# Patient Record
Sex: FEMALE | Race: WHITE | Employment: UNEMPLOYED | URBAN - METROPOLITAN AREA
[De-identification: names, ages, dates, MRNs, and addresses within clinical notes are randomized per-mention and may not be internally consistent; named-entity substitution may affect disease eponyms.]

---

## 2020-01-27 ENCOUNTER — APPOINTMENT (OUTPATIENT)
Dept: CT IMAGING | Age: 80
DRG: 392 | End: 2020-01-27
Payer: MEDICARE

## 2020-01-27 ENCOUNTER — HOSPITAL ENCOUNTER (INPATIENT)
Age: 80
LOS: 3 days | Discharge: NURSING, REHAB OR LONG-TERM CARE | DRG: 392 | End: 2020-01-31
Attending: EMERGENCY MEDICINE | Admitting: INTERNAL MEDICINE
Payer: MEDICARE

## 2020-01-27 PROBLEM — K52.9 ACUTE COLITIS: Status: ACTIVE | Noted: 2020-01-27

## 2020-01-27 PROBLEM — I10 ESSENTIAL HYPERTENSION: Status: ACTIVE | Noted: 2020-01-27

## 2020-01-27 LAB
ALBUMIN SERPL-MCNC: 4.2 G/DL (ref 3.5–5.2)
ALP BLD-CCNC: 89 U/L (ref 35–104)
ALT SERPL-CCNC: 8 U/L (ref 0–32)
ANION GAP SERPL CALCULATED.3IONS-SCNC: 12 MMOL/L (ref 7–16)
AST SERPL-CCNC: 19 U/L (ref 0–31)
BACTERIA: ABNORMAL /HPF
BILIRUB SERPL-MCNC: <0.2 MG/DL (ref 0–1.2)
BILIRUBIN URINE: NEGATIVE
BLOOD, URINE: ABNORMAL
BUN BLDV-MCNC: 16 MG/DL (ref 8–23)
CALCIUM SERPL-MCNC: 9.2 MG/DL (ref 8.6–10.2)
CHLORIDE BLD-SCNC: 105 MMOL/L (ref 98–107)
CLARITY: CLEAR
CO2: 21 MMOL/L (ref 22–29)
COLOR: YELLOW
CREAT SERPL-MCNC: 0.9 MG/DL (ref 0.5–1)
EKG ATRIAL RATE: 66 BPM
EKG P AXIS: 49 DEGREES
EKG P-R INTERVAL: 186 MS
EKG Q-T INTERVAL: 464 MS
EKG QRS DURATION: 104 MS
EKG QTC CALCULATION (BAZETT): 486 MS
EKG R AXIS: -23 DEGREES
EKG T AXIS: 5 DEGREES
EKG VENTRICULAR RATE: 66 BPM
EPITHELIAL CELLS, UA: ABNORMAL /HPF
GFR AFRICAN AMERICAN: >60
GFR NON-AFRICAN AMERICAN: >60 ML/MIN/1.73
GLUCOSE BLD-MCNC: 123 MG/DL (ref 74–99)
GLUCOSE URINE: NEGATIVE MG/DL
HCT VFR BLD CALC: 43.4 % (ref 34–48)
HEMOGLOBIN: 13.8 G/DL (ref 11.5–15.5)
INR BLD: 1
KETONES, URINE: NEGATIVE MG/DL
LACTIC ACID: 3 MMOL/L (ref 0.5–2.2)
LACTIC ACID: 4 MMOL/L (ref 0.5–2.2)
LEUKOCYTE ESTERASE, URINE: ABNORMAL
LIPASE: 71 U/L (ref 13–60)
MCH RBC QN AUTO: 30.5 PG (ref 26–35)
MCHC RBC AUTO-ENTMCNC: 31.8 % (ref 32–34.5)
MCV RBC AUTO: 96 FL (ref 80–99.9)
NITRITE, URINE: NEGATIVE
PDW BLD-RTO: 13.5 FL (ref 11.5–15)
PH UA: 7 (ref 5–9)
PLATELET # BLD: 332 E9/L (ref 130–450)
PMV BLD AUTO: 11.4 FL (ref 7–12)
POTASSIUM SERPL-SCNC: 4.2 MMOL/L (ref 3.5–5)
PROTEIN UA: NEGATIVE MG/DL
PROTHROMBIN TIME: 11.2 SEC (ref 9.3–12.4)
RBC # BLD: 4.52 E12/L (ref 3.5–5.5)
RBC UA: ABNORMAL /HPF (ref 0–2)
REASON FOR REJECTION: NORMAL
REASON FOR REJECTION: NORMAL
REJECTED TEST: NORMAL
REJECTED TEST: NORMAL
SODIUM BLD-SCNC: 138 MMOL/L (ref 132–146)
SPECIFIC GRAVITY UA: 1.01 (ref 1–1.03)
TOTAL PROTEIN: 6.8 G/DL (ref 6.4–8.3)
TROPONIN: <0.01 NG/ML (ref 0–0.03)
UROBILINOGEN, URINE: 0.2 E.U./DL
WBC # BLD: 13.6 E9/L (ref 4.5–11.5)
WBC UA: ABNORMAL /HPF (ref 0–5)

## 2020-01-27 PROCEDURE — G0378 HOSPITAL OBSERVATION PER HR: HCPCS

## 2020-01-27 PROCEDURE — 93005 ELECTROCARDIOGRAM TRACING: CPT | Performed by: EMERGENCY MEDICINE

## 2020-01-27 PROCEDURE — 96375 TX/PRO/DX INJ NEW DRUG ADDON: CPT

## 2020-01-27 PROCEDURE — 2580000003 HC RX 258: Performed by: NURSE PRACTITIONER

## 2020-01-27 PROCEDURE — 96366 THER/PROPH/DIAG IV INF ADDON: CPT

## 2020-01-27 PROCEDURE — 96365 THER/PROPH/DIAG IV INF INIT: CPT

## 2020-01-27 PROCEDURE — 6370000000 HC RX 637 (ALT 250 FOR IP): Performed by: NURSE PRACTITIONER

## 2020-01-27 PROCEDURE — 83605 ASSAY OF LACTIC ACID: CPT

## 2020-01-27 PROCEDURE — 87088 URINE BACTERIA CULTURE: CPT

## 2020-01-27 PROCEDURE — 36415 COLL VENOUS BLD VENIPUNCTURE: CPT

## 2020-01-27 PROCEDURE — 81001 URINALYSIS AUTO W/SCOPE: CPT

## 2020-01-27 PROCEDURE — 74177 CT ABD & PELVIS W/CONTRAST: CPT

## 2020-01-27 PROCEDURE — 2500000003 HC RX 250 WO HCPCS: Performed by: EMERGENCY MEDICINE

## 2020-01-27 PROCEDURE — 99285 EMERGENCY DEPT VISIT HI MDM: CPT

## 2020-01-27 PROCEDURE — 2580000003 HC RX 258: Performed by: EMERGENCY MEDICINE

## 2020-01-27 PROCEDURE — 6360000002 HC RX W HCPCS: Performed by: EMERGENCY MEDICINE

## 2020-01-27 PROCEDURE — 96361 HYDRATE IV INFUSION ADD-ON: CPT

## 2020-01-27 PROCEDURE — 93010 ELECTROCARDIOGRAM REPORT: CPT | Performed by: INTERNAL MEDICINE

## 2020-01-27 PROCEDURE — 2580000003 HC RX 258: Performed by: RADIOLOGY

## 2020-01-27 PROCEDURE — 6360000002 HC RX W HCPCS: Performed by: NURSE PRACTITIONER

## 2020-01-27 PROCEDURE — 2500000003 HC RX 250 WO HCPCS: Performed by: NURSE PRACTITIONER

## 2020-01-27 PROCEDURE — 84484 ASSAY OF TROPONIN QUANT: CPT

## 2020-01-27 PROCEDURE — 85610 PROTHROMBIN TIME: CPT

## 2020-01-27 PROCEDURE — 87186 SC STD MICRODIL/AGAR DIL: CPT

## 2020-01-27 PROCEDURE — 83690 ASSAY OF LIPASE: CPT

## 2020-01-27 PROCEDURE — 96374 THER/PROPH/DIAG INJ IV PUSH: CPT

## 2020-01-27 PROCEDURE — 6360000004 HC RX CONTRAST MEDICATION: Performed by: RADIOLOGY

## 2020-01-27 PROCEDURE — 80053 COMPREHEN METABOLIC PANEL: CPT

## 2020-01-27 PROCEDURE — 85027 COMPLETE CBC AUTOMATED: CPT

## 2020-01-27 PROCEDURE — 96367 TX/PROPH/DG ADDL SEQ IV INF: CPT

## 2020-01-27 PROCEDURE — 96376 TX/PRO/DX INJ SAME DRUG ADON: CPT

## 2020-01-27 RX ORDER — SODIUM CHLORIDE 0.9 % (FLUSH) 0.9 %
10 SYRINGE (ML) INJECTION EVERY 12 HOURS SCHEDULED
Status: DISCONTINUED | OUTPATIENT
Start: 2020-01-27 | End: 2020-01-31 | Stop reason: HOSPADM

## 2020-01-27 RX ORDER — ACETAMINOPHEN 325 MG/1
650 TABLET ORAL EVERY 6 HOURS PRN
COMMUNITY

## 2020-01-27 RX ORDER — ACETAMINOPHEN 325 MG/1
650 TABLET ORAL EVERY 4 HOURS PRN
Status: DISCONTINUED | OUTPATIENT
Start: 2020-01-27 | End: 2020-01-31 | Stop reason: HOSPADM

## 2020-01-27 RX ORDER — DONEPEZIL HYDROCHLORIDE 10 MG/1
10 TABLET, FILM COATED ORAL NIGHTLY
COMMUNITY

## 2020-01-27 RX ORDER — CITALOPRAM 20 MG/1
20 TABLET ORAL DAILY
Status: ON HOLD | COMMUNITY
End: 2021-09-06

## 2020-01-27 RX ORDER — HYDRALAZINE HYDROCHLORIDE 20 MG/ML
5 INJECTION INTRAMUSCULAR; INTRAVENOUS EVERY 6 HOURS PRN
Status: DISCONTINUED | OUTPATIENT
Start: 2020-01-27 | End: 2020-01-28

## 2020-01-27 RX ORDER — DIPHENHYDRAMINE HCL 25 MG
25 CAPSULE ORAL NIGHTLY PRN
Status: ON HOLD | COMMUNITY
End: 2021-09-13 | Stop reason: HOSPADM

## 2020-01-27 RX ORDER — ONDANSETRON 4 MG/1
4 TABLET, ORALLY DISINTEGRATING ORAL EVERY 8 HOURS PRN
Qty: 9 TABLET | Refills: 0 | Status: SHIPPED | OUTPATIENT
Start: 2020-01-27 | End: 2020-01-31 | Stop reason: HOSPADM

## 2020-01-27 RX ORDER — SODIUM CHLORIDE 0.9 % (FLUSH) 0.9 %
10 SYRINGE (ML) INJECTION
Status: COMPLETED | OUTPATIENT
Start: 2020-01-27 | End: 2020-01-27

## 2020-01-27 RX ORDER — ONDANSETRON 2 MG/ML
4 INJECTION INTRAMUSCULAR; INTRAVENOUS EVERY 6 HOURS PRN
Status: DISCONTINUED | OUTPATIENT
Start: 2020-01-27 | End: 2020-01-31 | Stop reason: HOSPADM

## 2020-01-27 RX ORDER — 0.9 % SODIUM CHLORIDE 0.9 %
1000 INTRAVENOUS SOLUTION INTRAVENOUS ONCE
Status: COMPLETED | OUTPATIENT
Start: 2020-01-27 | End: 2020-01-27

## 2020-01-27 RX ORDER — HYDROCODONE BITARTRATE AND ACETAMINOPHEN 5; 325 MG/1; MG/1
1 TABLET ORAL ONCE
Status: COMPLETED | OUTPATIENT
Start: 2020-01-27 | End: 2020-01-27

## 2020-01-27 RX ORDER — SODIUM CHLORIDE 0.9 % (FLUSH) 0.9 %
10 SYRINGE (ML) INJECTION PRN
Status: DISCONTINUED | OUTPATIENT
Start: 2020-01-27 | End: 2020-01-31 | Stop reason: HOSPADM

## 2020-01-27 RX ADMIN — Medication 10 ML: at 07:10

## 2020-01-27 RX ADMIN — SODIUM CHLORIDE, PRESERVATIVE FREE 10 ML: 5 INJECTION INTRAVENOUS at 20:02

## 2020-01-27 RX ADMIN — ONDANSETRON HYDROCHLORIDE 4 MG: 2 SOLUTION INTRAMUSCULAR; INTRAVENOUS at 04:16

## 2020-01-27 RX ADMIN — HYDROCODONE BITARTRATE AND ACETAMINOPHEN 1 TABLET: 5; 325 TABLET ORAL at 16:27

## 2020-01-27 RX ADMIN — HYDRALAZINE HYDROCHLORIDE 5 MG: 20 INJECTION INTRAMUSCULAR; INTRAVENOUS at 14:34

## 2020-01-27 RX ADMIN — ENOXAPARIN SODIUM 40 MG: 40 INJECTION SUBCUTANEOUS at 14:04

## 2020-01-27 RX ADMIN — SODIUM CHLORIDE 1000 ML: 9 INJECTION, SOLUTION INTRAVENOUS at 07:39

## 2020-01-27 RX ADMIN — CEFTRIAXONE SODIUM 1 G: 1 INJECTION, POWDER, FOR SOLUTION INTRAMUSCULAR; INTRAVENOUS at 09:34

## 2020-01-27 RX ADMIN — SODIUM CHLORIDE 1000 ML: 9 INJECTION, SOLUTION INTRAVENOUS at 04:15

## 2020-01-27 RX ADMIN — ACETAMINOPHEN 650 MG: 325 TABLET, FILM COATED ORAL at 19:54

## 2020-01-27 RX ADMIN — METRONIDAZOLE 500 MG: 500 INJECTION, SOLUTION INTRAVENOUS at 20:02

## 2020-01-27 RX ADMIN — ACETAMINOPHEN 650 MG: 325 TABLET, FILM COATED ORAL at 14:05

## 2020-01-27 RX ADMIN — IOPAMIDOL 110 ML: 755 INJECTION, SOLUTION INTRAVENOUS at 07:10

## 2020-01-27 RX ADMIN — METRONIDAZOLE 500 MG: 500 INJECTION, SOLUTION INTRAVENOUS at 12:15

## 2020-01-27 SDOH — HEALTH STABILITY: MENTAL HEALTH: HOW OFTEN DO YOU HAVE A DRINK CONTAINING ALCOHOL?: NEVER

## 2020-01-27 ASSESSMENT — PAIN SCALES - GENERAL
PAINLEVEL_OUTOF10: 10
PAINLEVEL_OUTOF10: 0
PAINLEVEL_OUTOF10: 4
PAINLEVEL_OUTOF10: 4

## 2020-01-27 NOTE — ED PROVIDER NOTES
HPI:  1/27/20, Time: 4:01 AM         Kya Mccoy is a [de-identified] y.o. female presenting to the ED for nausea vomiting diarrhea, beginning hours ago. The complaint has been persistent, moderate in severity, and worsened by nothing. Patient reporting nausea vomiting and diarrhea and reporting symptoms started hours ago. Patient reporting no chest pain or difficulty breathing. Patient is visiting from [place] patient reports she was at a party today. She reports nobody else being ill. Patient reporting no neck or back pain. ROS:   Pertinent positives and negatives are stated within HPI, all other systems reviewed and are negative.  --------------------------------------------- PAST HISTORY ---------------------------------------------  Past Medical History:  has no past medical history on file. Past Surgical History:  has no past surgical history on file. Social History:  reports that she has never smoked. She has never used smokeless tobacco. She reports that she does not drink alcohol or use drugs. Family History: family history is not on file. The patients home medications have been reviewed. Allergies: Patient has no known allergies. ---------------------------------------------------PHYSICAL EXAM--------------------------------------    Constitutional/General: Alert and oriented to person and place but not time mild distress  Head: Normocephalic and atraumatic  Eyes: PERRL, EOMI  Mouth: Oropharynx clear, handling secretions, no trismus  Neck: Supple, full ROM, non tender to palpation in the midline, no stridor, no crepitus, no meningeal signs  Pulmonary: Lungs clear to auscultation bilaterally, no wheezes, rales, or rhonchi. Not in respiratory distress  Cardiovascular:  Regular rate. Regular rhythm. No murmurs, gallops, or rubs. 2+ distal pulses  Chest: no chest wall tenderness  Abdomen: Soft. Non tender. Non distended. +BS. No rebound, guarding, or rigidity.  No pulsatile masses appreciated. Musculoskeletal: Moves all extremities x 4. Warm and well perfused, no clubbing, cyanosis, or edema. Capillary refill <3 seconds  Skin: warm and dry. No rashes. Neurologic: GCS 15, CN 2-12 grossly intact, no focal deficits, symmetric strength 5/5 in the upper and lower extremities bilaterally  Psych: Normal Affect    -------------------------------------------------- RESULTS -------------------------------------------------  I have personally reviewed all laboratory and imaging results for this patient. Results are listed below.      LABS:  Results for orders placed or performed during the hospital encounter of 01/27/20   CBC   Result Value Ref Range    WBC 13.6 (H) 4.5 - 11.5 E9/L    RBC 4.52 3.50 - 5.50 E12/L    Hemoglobin 13.8 11.5 - 15.5 g/dL    Hematocrit 43.4 34.0 - 48.0 %    MCV 96.0 80.0 - 99.9 fL    MCH 30.5 26.0 - 35.0 pg    MCHC 31.8 (L) 32.0 - 34.5 %    RDW 13.5 11.5 - 15.0 fL    Platelets 067 221 - 850 E9/L    MPV 11.4 7.0 - 12.0 fL   Lactic Acid, Plasma   Result Value Ref Range    Lactic Acid 4.0 (H) 0.5 - 2.2 mmol/L   Urinalysis   Result Value Ref Range    Color, UA Yellow Straw/Yellow    Clarity, UA Clear Clear    Glucose, Ur Negative Negative mg/dL    Bilirubin Urine Negative Negative    Ketones, Urine Negative Negative mg/dL    Specific Gravity, UA 1.010 1.005 - 1.030    Blood, Urine TRACE-INTACT Negative    pH, UA 7.0 5.0 - 9.0    Protein, UA Negative Negative mg/dL    Urobilinogen, Urine 0.2 <2.0 E.U./dL    Nitrite, Urine Negative Negative    Leukocyte Esterase, Urine TRACE (A) Negative   Protime-INR   Result Value Ref Range    Protime 11.2 9.3 - 12.4 sec    INR 1.0    SPECIMEN REJECTION   Result Value Ref Range    Rejected Test cmp trop lipas     Reason for Rejection see below    Troponin   Result Value Ref Range    Troponin <0.01 0.00 - 0.03 ng/mL   SPECIMEN REJECTION   Result Value Ref Range    Rejected Test CMP LIPASE     Reason for Rejection see below    Comprehensive ---------------------------------    IMPRESSION  1. Nausea vomiting and diarrhea    2. Lactic acidosis    3. Acute colitis        DISPOSITION  Disposition: To be discharged if CT within normal limits  Patient condition is stable        NOTE: This report was transcribed using voice recognition software. Every effort was made to ensure accuracy; however, inadvertent computerized transcription errors may be present          Maria Del Carmen Murphy MD  01/27/20 0430       Maria Del Carmen Murphy MD  01/27/20 0708    9:15 AM    I received this patient at sign out from Dr. Genella Pallas   I have discussed the patient's initial exam, treatment and plan of care with the out going physician. I have introduced my self to the patient / family and have answered their questions to this point. I have examined the patient myself and reviewed ordered tests / medications and reviewed any available results to this point. If a resident is involved in the Emergency Department care, I have discussed my findings and plan with them as well.     Nausea not feeling well  CT with acute colitis  Elevated wbc and elevated lactic  Abdomen soft, no pain now and no pain out of proportion  Medicine consulted for admission        Julian Fu MD  01/27/20 7492

## 2020-01-27 NOTE — ED NOTES
When asked if zofran helped, pt states not at all. Pt denies nausea and has no emesis while in ED.       Terri De Los Santos RN  01/27/20 6502

## 2020-01-27 NOTE — ED NOTES
Green top drawn via right brachial artery and sent for the third time.       Rhea Cobb RN  01/27/20 9794

## 2020-01-27 NOTE — ED NOTES
Pt asked for bedpan again to provide urine spec. Pt had another small bowel movement.  Pt awaiting transport to [address] [place] [address],[address], RN  01/27/20 2605

## 2020-01-27 NOTE — ED NOTES
Bed: 09  Expected date:   Expected time:   Means of arrival:   Comments:  triage     Bhupinder Ruiz, RN  01/27/20 8872

## 2020-01-28 PROBLEM — R11.2 NAUSEA AND VOMITING: Status: ACTIVE | Noted: 2020-01-28

## 2020-01-28 PROBLEM — N39.0 URINARY TRACT INFECTION: Status: ACTIVE | Noted: 2020-01-28

## 2020-01-28 LAB
ANION GAP SERPL CALCULATED.3IONS-SCNC: 16 MMOL/L (ref 7–16)
BUN BLDV-MCNC: 10 MG/DL (ref 8–23)
CALCIUM SERPL-MCNC: 9.3 MG/DL (ref 8.6–10.2)
CHLORIDE BLD-SCNC: 105 MMOL/L (ref 98–107)
CO2: 20 MMOL/L (ref 22–29)
CREAT SERPL-MCNC: 1 MG/DL (ref 0.5–1)
GFR AFRICAN AMERICAN: >60
GFR NON-AFRICAN AMERICAN: 53 ML/MIN/1.73
GLUCOSE BLD-MCNC: 103 MG/DL (ref 74–99)
HCT VFR BLD CALC: 38.2 % (ref 34–48)
HEMOGLOBIN: 12.1 G/DL (ref 11.5–15.5)
MAGNESIUM: 2.1 MG/DL (ref 1.6–2.6)
MCH RBC QN AUTO: 30.1 PG (ref 26–35)
MCHC RBC AUTO-ENTMCNC: 31.7 % (ref 32–34.5)
MCV RBC AUTO: 95 FL (ref 80–99.9)
PDW BLD-RTO: 13.7 FL (ref 11.5–15)
PLATELET # BLD: 276 E9/L (ref 130–450)
PMV BLD AUTO: 9.8 FL (ref 7–12)
POTASSIUM REFLEX MAGNESIUM: 3.6 MMOL/L (ref 3.5–5)
RBC # BLD: 4.02 E12/L (ref 3.5–5.5)
SODIUM BLD-SCNC: 141 MMOL/L (ref 132–146)
WBC # BLD: 8.7 E9/L (ref 4.5–11.5)

## 2020-01-28 PROCEDURE — 97162 PT EVAL MOD COMPLEX 30 MIN: CPT

## 2020-01-28 PROCEDURE — 2500000003 HC RX 250 WO HCPCS: Performed by: NURSE PRACTITIONER

## 2020-01-28 PROCEDURE — 2580000003 HC RX 258: Performed by: INTERNAL MEDICINE

## 2020-01-28 PROCEDURE — 1200000000 HC SEMI PRIVATE

## 2020-01-28 PROCEDURE — 6370000000 HC RX 637 (ALT 250 FOR IP): Performed by: NURSE PRACTITIONER

## 2020-01-28 PROCEDURE — 97530 THERAPEUTIC ACTIVITIES: CPT

## 2020-01-28 PROCEDURE — 96366 THER/PROPH/DIAG IV INF ADDON: CPT

## 2020-01-28 PROCEDURE — 2580000003 HC RX 258: Performed by: NURSE PRACTITIONER

## 2020-01-28 PROCEDURE — 96376 TX/PRO/DX INJ SAME DRUG ADON: CPT

## 2020-01-28 PROCEDURE — 97165 OT EVAL LOW COMPLEX 30 MIN: CPT

## 2020-01-28 PROCEDURE — 36415 COLL VENOUS BLD VENIPUNCTURE: CPT

## 2020-01-28 PROCEDURE — 6360000002 HC RX W HCPCS: Performed by: NURSE PRACTITIONER

## 2020-01-28 PROCEDURE — 97535 SELF CARE MNGMENT TRAINING: CPT

## 2020-01-28 PROCEDURE — 96372 THER/PROPH/DIAG INJ SC/IM: CPT

## 2020-01-28 PROCEDURE — 83735 ASSAY OF MAGNESIUM: CPT

## 2020-01-28 PROCEDURE — 85027 COMPLETE CBC AUTOMATED: CPT

## 2020-01-28 PROCEDURE — 80048 BASIC METABOLIC PNL TOTAL CA: CPT

## 2020-01-28 RX ORDER — SODIUM CHLORIDE, SODIUM LACTATE, POTASSIUM CHLORIDE, CALCIUM CHLORIDE 600; 310; 30; 20 MG/100ML; MG/100ML; MG/100ML; MG/100ML
INJECTION, SOLUTION INTRAVENOUS CONTINUOUS
Status: DISCONTINUED | OUTPATIENT
Start: 2020-01-28 | End: 2020-01-30

## 2020-01-28 RX ADMIN — ENOXAPARIN SODIUM 40 MG: 40 INJECTION SUBCUTANEOUS at 08:07

## 2020-01-28 RX ADMIN — SODIUM CHLORIDE, POTASSIUM CHLORIDE, SODIUM LACTATE AND CALCIUM CHLORIDE: 600; 310; 30; 20 INJECTION, SOLUTION INTRAVENOUS at 17:39

## 2020-01-28 RX ADMIN — CEFTRIAXONE SODIUM 1 G: 1 INJECTION, POWDER, FOR SOLUTION INTRAMUSCULAR; INTRAVENOUS at 08:07

## 2020-01-28 RX ADMIN — SODIUM CHLORIDE, PRESERVATIVE FREE 10 ML: 5 INJECTION INTRAVENOUS at 08:09

## 2020-01-28 RX ADMIN — ACETAMINOPHEN 650 MG: 325 TABLET, FILM COATED ORAL at 08:08

## 2020-01-28 RX ADMIN — SODIUM CHLORIDE, PRESERVATIVE FREE 10 ML: 5 INJECTION INTRAVENOUS at 17:37

## 2020-01-28 RX ADMIN — ACETAMINOPHEN 650 MG: 325 TABLET, FILM COATED ORAL at 03:08

## 2020-01-28 RX ADMIN — ONDANSETRON HYDROCHLORIDE 4 MG: 2 SOLUTION INTRAMUSCULAR; INTRAVENOUS at 08:25

## 2020-01-28 RX ADMIN — ACETAMINOPHEN 650 MG: 325 TABLET, FILM COATED ORAL at 21:20

## 2020-01-28 RX ADMIN — METRONIDAZOLE 500 MG: 500 INJECTION, SOLUTION INTRAVENOUS at 04:05

## 2020-01-28 ASSESSMENT — PAIN SCALES - GENERAL
PAINLEVEL_OUTOF10: 4
PAINLEVEL_OUTOF10: 3
PAINLEVEL_OUTOF10: 2
PAINLEVEL_OUTOF10: 0
PAINLEVEL_OUTOF10: 0

## 2020-01-28 ASSESSMENT — PAIN DESCRIPTION - DESCRIPTORS: DESCRIPTORS: HEADACHE

## 2020-01-28 NOTE — PROGRESS NOTES
OCCUPATIONAL THERAPY INITIAL EVALUATION      Date:2020  Patient Name: Inés Dumont  MRN: [id number]  : 1940  Room: [address] [place] [address]-A      [address], OTR/L [address][id number]    AM-PAC Daily Activity Raw Score:   Recommended Adaptive Equipment: ww, TBD for additional AE    Diagnosis: Acute colitis [K52.9]  Acute colitis [K52.9]  Urinary tract infection [N39.0]       Pertinent Medical History: HTN     Precautions:  Falls, bed alarm     Home Living: Pt lives with sister in 2 floor home (+basement). 1 step down to first floor, 0 handrails   Bath/bed on 2nd floor - flight of stairs + landing + 3 stairs, 0 handrail  Bathroom setup: tub/shower w/ bars, standard commode  Equipment owned: n/a    Prior Level of Function: independent with ADLs , shares IADLs; ambulated independently w/o AD  Driving: no - friend assists  Pt is a questionable historian - provided conflicting PLOF. Pain Level: Pt c/o headache this session    Cognition: A&O: 3/4 (Pt required min cues to recall month); Follows 1 step directions   Memory:  fair (3/3 immediate recall. After ~5 minutes, pt required cues to recall 3/3 words).     Sequencing:  fair    Problem solving:  fair    Judgement/safety:  fair -     Functional Assessment:   Initial Eval Status  Date: 20 Treatment Status  Date: Short Term Goals  Treatment frequency: PRN    Feeding Supervision   Independent   Grooming Stand by Assist   Standing at sink  Modified [place]    UB Dressing Stand by Assist   Modified [place]    LB Dressing Minimal Assist   Simulated pants    SBA for B socks  Supervision    Bathing Minimal Assist    simulated  Supervision    Toileting Minimal Assist   Supervision    Bed Mobility  Supine to sit: Stand by Assist   Sit to supine: Stand by Assist   Rolling: Independent   Supine to sit: Modified [place]   Sit to supine: Modified [place]    Functional Transfers SBA  Various surfaces  Supervision   Functional Mobility SBA w/ ww  Min A w/o AD  Supervision   Balance Sitting: SBA  Standing: SBA w/ ww  Min A w/o AD     Activity Tolerance Fair  Good   Visual/  Perceptual Glasses: yes, not present  Vision - WFL                Hand dominance: R   Strength ROM Additional Info:    RUE  4/5  WFL   good  and wfl FMC/dexterity noted during ADL tasks       LUE 4/5  WFL   good  and wfl FMC/dexterity noted during ADL tasks       Hearing: WFL  Sensation:  No c/o numbness or tingling   Tone: WFL   Edema: none noted                            Comments/Treatment: Upon arrival, patient lying in bed. Pt agreeable to OT session this date. Therapist facilitated bed mobility (cues for sequencing), functional transfers (various surfaces-EOB, chair, toilet w/ cues for safety/hand placement, attention and sequencing), standing tolerance tasks (addressing posture, balance and activity tolerance) and functional ambulation task with w/w (in hallway and bathrm/room w/ education/cuing on posture, w/w management, sequencing and safety). Therapist facilitated self-care retraining: UB/LB self-care tasks (socks, simulated pants), toileting task (including hygiene) and standing grooming task (w/ increased cues for sequencing and attention) while educating pt on modified techniques, posture, safety and energy conservation techniques. Skilled monitoring of HR, O2 sats and pts response to treatment. At end of session, patient lying in bed (bed alarm on) with call light and phone within reach, all lines and tubes intact. Pt would benefit from continued skilled OT to increase functional independence and quality of life. Eval Complexity: Low    Evaluation time includes thorough review of current medical information, gathering information on past medical history/social history and prior level of function, completion of standardized testing/informal observation of tasks, assessment of data, and development of POC/Goals.       Assessment of current deficits  Functional mobility [x]  ADLs [x] Strength [x]  Cognition [x]  Functional transfers  [x] IADLs [] Safety Awareness [x]  Endurance [x]  Fine Motor Coordination [] Balance [x] Vision/perception [] Sensation []   Gross Motor Coordination [] ROM [] Delirium []                  Motor Control []    Plan of Care:   ADL retraining [x]   Equipment needs [x]   Neuromuscular re-education [x] Energy Conservation Techniques [x]  Functional Transfer training [x] Patient and/or Family Education [x]  Functional Mobility training [x]  Environmental Modifications [x]  Cognitive re-training [x]   Compensatory techniques for ADLs [x]  Splinting Needs []   Positioning to improve overall function [x]   Therapeutic Activity [x]  Therapeutic Exercise  [x]  Visual/Perceptual: []    Delirium prevention/treatment  []   Other:  []    Rehab Potential: Good for established goals    Patient / Family Goal: Not stated     Patient and/or family were instructed on diagnosis, prognosis/goals and plan of care. Demonstrated fair- understanding. [] Malnutrition indicators have been identified and nursing has been notified to ensure a dietitian consult is ordered.        Low Evaluation completed +  Timed Treatment: 14 minutes  Tx Time in: 14:43  TxTime out: 14:57           Willam Bourne, OTR/L #[id number]    Linnea Ortiz, OTR/L #[id number]

## 2020-01-28 NOTE — H&P
[address] [facility]  History and Physical      CHIEF COMPLAINT:  Abdominal pain, nausea, weakness, dysuria    History of Present Illness:   Patient presents with constellation of complaints. She has had nausea, vomiting, and abdominal pian for several days. No alleviating or exacerbating factors. The abdominal pain is generalized and nonradiating; emesis is nonbloody non-bilious. Associated with all of this, she has had dizziness when stands, but no syncope. Patient with complaint of dysuria. No flank pain. Has not received antibiotics for this as outpatient. Furthermore, her sense that she has severe generalized weakness. Has been going on for a longer period of time, at least months. He is home with her elderly sister who is in very poor health. Apparently the patient can barely climb the symptoms and on her hands and knees. The patient denies any focal weakness. No shortness of breath or chest pain. In the ED she had a CT scan of the abdomen which shows diffuse colitis. She denies fevers or chills. She has never had a colonoscopy. PMHx:  Depression    PSH:  None    Medications Prior to Admission:    Medications Prior to Admission: donepezil (ARICEPT) 5 MG tablet, Take 5 mg by mouth nightly  acetaminophen (TYLENOL) 325 MG tablet, Take 325 mg by mouth every 6 hours as needed for Pain  citalopram (CELEXA) 20 MG tablet, Take 20 mg by mouth daily  diphenhydrAMINE (BENADRYL) 25 MG capsule, Take 25 mg by mouth nightly as needed for Sleep    Note that the patient's home medications were reviewed and the above list is accurate to the best of my knowledge at the time of the exam.    Allergies:    Patient has no known allergies. Social History:    reports that she has never smoked. She has never used smokeless tobacco. She reports that she does not drink alcohol or use drugs.     Family History:   No family history of GI issues like colon cancer, IBD    REVIEW OF SYSTEMS:  As above in

## 2020-01-28 NOTE — PROGRESS NOTES
Physical Therapy  Initial Assessment     Name: Mireille Urbina  : 1940  MRN: [id number]    Date of Service: 2020    Evaluating PT: Aziza Mendoza, PT, DPT [id number]    Room #:  8231/0817-P    Diagnosis: acute collitis  Precautions: falls, confusion  PMHx: none on file    Pt lives with sister in a 2 story house with 0 stair(s) and 0 rail(s) to enter. Bed is on the second floor and bath is on the second floor. 15 steps with R ascending hand rail to second floor. Pt ambulated with no AD independently prior to admission. Pt has history of confusion so above information may have limited accuracy. HPI: Pt presented to the ED on 2020     Initial Evaluation  Date: 2020 Treatment Date: Short Term/ Long Term   Goals   AM-PAC 6 Clicks      Was pt agreeable to Eval/treatment? yes     Does pt have pain? No complaints at this time     Bed Mobility  Rolling: SBA  Supine to sit: SBA  Sit to supine: SBA  Scooting: SBA  Rolling: Independent  Supine to sit: Independent  Sit to supine: Independent  Scooting: Independent   Transfers Sit to stand: Ame  Stand to sit: Ame  Stand pivot: Ame no AD  Sit to stand: Independent  Stand to sit: Independent  Stand pivot: Independent AAD   Ambulation   50 feet with front WW with Ame for [employer] management and balance  50 feet with AAD with SBA   Stair negotiation: NT  4 step(s) with 1 rail(s) with Ame   ROM B UE: per OT note  B LE: WNL     Strength B UE: per OT note  B LE: NT     Balance Sitting EOB: SBA  Dynamic standing: Ame  Sitting EOB: Independent  Dynamic standing: Independent     Pt is A & O x: 3 not oriented to date  Sensation: WNL per pt report  Coordination: NT  Edema: unremarkable    ASSESSMENT    Patient education  Pt educated on role of PT intervention. Room safety and utilization of call light for OOB assistance.      Patient response to education:   Pt verbalized understanding Pt demonstrated skill Pt requires further education in this area   yes yes yes Comments:  Pt received supine in bed and agreeable to PT intervention at this time. Pt does have history of confusion and per RN pt has been completely confused, but throughout session pt had no confusion or behavioral issues. At end of session pt returned to chair repositioned for comfort and call light left within reach. Shortly after she accidentally rang her call light and appeared as though she was going to stand on her own. At this time RN returned pt to bed as chair alarm was not immediately available. Pt will continue to benefit from skilled PT intervention for the purposes of maximizing functional mobility. Pts/family goals:  Pt goal is to go home with sister    Patient and or family understand(s) diagnosis, prognosis, and plan of care:  yes    PLAN  PT care will be provided in accordance with the objectives noted above. Whenever appropriate, clear delegation orders will be provided for nursing staff. Exercises and functional mobility practice will be used as well as appropriate assistive devices or modalities to obtain goals. Patient and family education will also be administered as needed. Frequency of treatments: 2-5x/week x 5 days.     Time in: 1357  Time out: 1060 [facility], PT, DPT  [id number]

## 2020-01-28 NOTE — CARE COORDINATION
Patient is here under observation for Acute self-limited viral illness. GI following. Per GI note yesterday, Maybe an episode of food poisoning, toxin induced. 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 PLAN:  Advance her diet. Anticipate discharge home tomorrow. She is currently on a general diet and PT/OT has been ordered to assist with transition of care determination.   Mireya Ribeiro RN CM

## 2020-01-28 NOTE — CONSULTS
[address]                  [address] [place],  [facility]                                  CONSULTATION    PATIENT NAME: Daniella Dove                     :        1940  MED REC NO:   [id number]                            ROOM:       8407  ACCOUNT NO:   [de-identified]                           ADMIT DATE: 2020  PROVIDER:     Addis Morelos MD    CONSULT DATE:  2020    REASON FOR CONSULTATION:  Nausea, vomiting, and diarrhea. HISTORY OF PRESENT ILLNESS:  She is an 26-year-old woman. She seems to  have a minimal past medical history which includes hypertension and  hyperlipidemia. CT evidence of previous cerebrovascular accident. She  retired well. She awoke with nausea, vomiting, and diarrhea with some  headache. Only the headache remains and it is not severe. Nausea,  vomiting, and diarrhea were all acute and self-limited and seemed to  have dissipated since her arrival in the emergency room and subsequent  admission to the hospital.  On admission to the hospital, she was  afebrile. She had a white count of 13.6, hematocrit of 43, platelet  count of 334,750. Her comprehensive metabolic panel was normal.  Lipase  minimally elevated at 71. CAT scan of the abdomen was interpreted as  showing mild diffuse wall thickening of the colon suggesting  inflammatory infectious colitis. Diverticular disease without  diverticulitis. Though not mentioned, there appears to be a very large  periampullary duodenal diverticulum. PAST MEDICAL HISTORY:  Seems to be hypertension, hyperlipidemia, knee  replacement, and apparent cerebrovascular accident. MEDICATIONS:  Listed on admission and is by no means clear that the list  is complete included Benadryl, Celexa, acetaminophen, and Aricept. ALLERGIES:  With no listed allergies. SOCIAL HISTORY:  She is unemployed. She is . She follows with  Dr. Cheyanne Garcia in [place]. No alcohol. Never smoked. OBJECTIVE:  GENERAL:  She is alert, elderly woman lying comfortably. VITAL SIGNS:  She is afebrile. Pulse is 65 to 75. Room air saturations  96%. Blood pressure last recorded 141/62. HEENT:  There is neither pallor or scleral icterus, neck vein elevation,  or adenopathy. LUNGS:  Clear. HEART:  Tones are normal.  ABDOMEN:  She has a perfectly benign abdomen. EXTREMITIES:  No edema. ASSESSMENT:  Acute self-limited viral illness it seems. Very brief. Maybe an episode of food poisoning, toxin induced. Minimal leukocytosis. 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 I have  Not concerns re: inflammatory colonic disease or colitis. PLAN:  Advance her diet. Anticipate discharge home tomorrow.         Amish Amaya MD    D: 01/27/2020 20:28:32       T: 01/27/2020 20:32:45     [email or web address]  Job#: [id number]     Doc#: [id number]    CC:

## 2020-01-29 PROCEDURE — 2580000003 HC RX 258: Performed by: INTERNAL MEDICINE

## 2020-01-29 PROCEDURE — 6360000002 HC RX W HCPCS: Performed by: INTERNAL MEDICINE

## 2020-01-29 PROCEDURE — 2580000003 HC RX 258: Performed by: NURSE PRACTITIONER

## 2020-01-29 PROCEDURE — 6370000000 HC RX 637 (ALT 250 FOR IP): Performed by: NURSE PRACTITIONER

## 2020-01-29 PROCEDURE — 6360000002 HC RX W HCPCS: Performed by: NURSE PRACTITIONER

## 2020-01-29 PROCEDURE — 1200000000 HC SEMI PRIVATE

## 2020-01-29 RX ADMIN — ONDANSETRON HYDROCHLORIDE 4 MG: 2 SOLUTION INTRAMUSCULAR; INTRAVENOUS at 21:30

## 2020-01-29 RX ADMIN — SODIUM CHLORIDE, PRESERVATIVE FREE 10 ML: 5 INJECTION INTRAVENOUS at 13:09

## 2020-01-29 RX ADMIN — ACETAMINOPHEN 650 MG: 325 TABLET, FILM COATED ORAL at 21:30

## 2020-01-29 RX ADMIN — ENOXAPARIN SODIUM 40 MG: 40 INJECTION SUBCUTANEOUS at 08:20

## 2020-01-29 RX ADMIN — CEFTRIAXONE SODIUM 1 G: 1 INJECTION, POWDER, FOR SOLUTION INTRAMUSCULAR; INTRAVENOUS at 13:08

## 2020-01-29 RX ADMIN — SODIUM CHLORIDE, POTASSIUM CHLORIDE, SODIUM LACTATE AND CALCIUM CHLORIDE: 600; 310; 30; 20 INJECTION, SOLUTION INTRAVENOUS at 14:16

## 2020-01-29 RX ADMIN — ACETAMINOPHEN 650 MG: 325 TABLET, FILM COATED ORAL at 08:20

## 2020-01-29 RX ADMIN — ACETAMINOPHEN 650 MG: 325 TABLET, FILM COATED ORAL at 13:08

## 2020-01-29 ASSESSMENT — PAIN SCALES - GENERAL
PAINLEVEL_OUTOF10: 0
PAINLEVEL_OUTOF10: 5
PAINLEVEL_OUTOF10: 7
PAINLEVEL_OUTOF10: 3
PAINLEVEL_OUTOF10: 3
PAINLEVEL_OUTOF10: 4

## 2020-01-29 ASSESSMENT — PAIN DESCRIPTION - FREQUENCY: FREQUENCY: CONTINUOUS

## 2020-01-29 ASSESSMENT — PAIN DESCRIPTION - LOCATION: LOCATION: HEAD

## 2020-01-29 ASSESSMENT — PAIN DESCRIPTION - DESCRIPTORS
DESCRIPTORS: ACHING;CONSTANT;DISCOMFORT
DESCRIPTORS: HEADACHE
DESCRIPTORS: HEADACHE

## 2020-01-29 ASSESSMENT — PAIN - FUNCTIONAL ASSESSMENT: PAIN_FUNCTIONAL_ASSESSMENT: ACTIVITIES ARE NOT PREVENTED

## 2020-01-29 ASSESSMENT — PAIN DESCRIPTION - PROGRESSION: CLINICAL_PROGRESSION: NOT CHANGED

## 2020-01-29 ASSESSMENT — PAIN DESCRIPTION - DIRECTION
RADIATING_TOWARDS: HEAD
RADIATING_TOWARDS: HEAD

## 2020-01-29 ASSESSMENT — PAIN DESCRIPTION - ONSET: ONSET: ON-GOING

## 2020-01-29 ASSESSMENT — PAIN DESCRIPTION - PAIN TYPE: TYPE: ACUTE PAIN

## 2020-01-29 NOTE — CARE COORDINATION
Per Joanne Sy from [facility], precert was started yesterday and is still pending. Completed Hens placed in envelope in soft chart. I did leave a voicemail message with patient's nephew Marcos Khan to discuss a plan B option in case the insurance denies SNF. Await call back.   Mireya Ribeiro RN CM

## 2020-01-29 NOTE — PROGRESS NOTES
Subjective:    Pt is feeling OK today. Still mild dysuria. No further n/v. Tolerated breakfast without issues. No f/c. Objective:    BP (!) 147/59   Pulse 61   Temp 98.5 °F (36.9 °C) (Temporal)   Resp 16   Ht 5' 3\" (1.6 m)   Wt 134 lb (60.8 kg)   SpO2 96%   BMI 23.74 kg/m²     Current medications that patient is taking have been reviewed. General appearance: NAD, conversant. Very frail appearing  HEENT: AT/NC, MMM  Neck: FROM, supple  Lungs: Clear to auscultation  CV: RRR, no MRGs  Abdomen: Soft, non-tender; no masses or HSM, +BS  Extremities: No peripheral edema or digital cyanosis  Skin: no rash, lesions or ulcers  Psych: Calm and cooperative  Neuro: Alert and interactive, nonfocal    Labs:  CBC:   Lab Results   Component Value Date    WBC 8.7 01/28/2020    RBC 4.02 01/28/2020    HGB 12.1 01/28/2020    HCT 38.2 01/28/2020    MCV 95.0 01/28/2020    MCH 30.1 01/28/2020    MCHC 31.7 01/28/2020    RDW 13.7 01/28/2020     01/28/2020    MPV 9.8 01/28/2020     BMP:    Lab Results   Component Value Date     01/28/2020    K 3.6 01/28/2020     01/28/2020    CO2 20 01/28/2020    BUN 10 01/28/2020    LABALBU 4.2 01/27/2020    CREATININE 1.0 01/28/2020    CALCIUM 9.3 01/28/2020    GFRAA >60 01/28/2020    LABGLOM 53 01/28/2020    GLUCOSE 103 01/28/2020      UCx shows e.coli, sensitivities pending    Assessment/Plan:    Principal Problem:    Acute colitis  Active Problems:    Essential hypertension    Urinary tract infection    Nausea and vomiting  Resolved Problems:    * No resolved hospital problems. *    Supportive care for colitis.   She's eating but not much  Continue slow IV fluids for now  Continue ceftriaxone until sensitivities on urine come back  N/V improving    Requires continued inpatient level of care   Suzan Pollack    12:43 PM  1/29/2020  Cell: [phone]

## 2020-01-30 LAB
ORGANISM: ABNORMAL
URINE CULTURE, ROUTINE: ABNORMAL

## 2020-01-30 PROCEDURE — 2580000003 HC RX 258: Performed by: NURSE PRACTITIONER

## 2020-01-30 PROCEDURE — 6370000000 HC RX 637 (ALT 250 FOR IP): Performed by: INTERNAL MEDICINE

## 2020-01-30 PROCEDURE — 1200000000 HC SEMI PRIVATE

## 2020-01-30 PROCEDURE — 2580000003 HC RX 258: Performed by: INTERNAL MEDICINE

## 2020-01-30 PROCEDURE — 6370000000 HC RX 637 (ALT 250 FOR IP): Performed by: NURSE PRACTITIONER

## 2020-01-30 PROCEDURE — 6360000002 HC RX W HCPCS: Performed by: INTERNAL MEDICINE

## 2020-01-30 PROCEDURE — 6360000002 HC RX W HCPCS: Performed by: NURSE PRACTITIONER

## 2020-01-30 RX ORDER — CEFDINIR 300 MG/1
300 CAPSULE ORAL EVERY 12 HOURS SCHEDULED
Status: DISCONTINUED | OUTPATIENT
Start: 2020-01-30 | End: 2020-01-31 | Stop reason: HOSPADM

## 2020-01-30 RX ADMIN — SODIUM CHLORIDE, PRESERVATIVE FREE 10 ML: 5 INJECTION INTRAVENOUS at 12:49

## 2020-01-30 RX ADMIN — ACETAMINOPHEN 650 MG: 325 TABLET, FILM COATED ORAL at 21:36

## 2020-01-30 RX ADMIN — SODIUM CHLORIDE, PRESERVATIVE FREE 10 ML: 5 INJECTION INTRAVENOUS at 21:36

## 2020-01-30 RX ADMIN — CEFDINIR 300 MG: 300 CAPSULE ORAL at 21:36

## 2020-01-30 RX ADMIN — ACETAMINOPHEN 650 MG: 325 TABLET, FILM COATED ORAL at 16:41

## 2020-01-30 RX ADMIN — CEFTRIAXONE SODIUM 1 G: 1 INJECTION, POWDER, FOR SOLUTION INTRAMUSCULAR; INTRAVENOUS at 12:49

## 2020-01-30 RX ADMIN — ACETAMINOPHEN 650 MG: 325 TABLET, FILM COATED ORAL at 08:51

## 2020-01-30 ASSESSMENT — PAIN DESCRIPTION - LOCATION: LOCATION: HEAD

## 2020-01-30 ASSESSMENT — PAIN SCALES - GENERAL
PAINLEVEL_OUTOF10: 5
PAINLEVEL_OUTOF10: 0
PAINLEVEL_OUTOF10: 3
PAINLEVEL_OUTOF10: 6

## 2020-01-30 ASSESSMENT — PAIN DESCRIPTION - DIRECTION: RADIATING_TOWARDS: HEADACHE

## 2020-01-30 ASSESSMENT — PAIN SCALES - WONG BAKER
WONGBAKER_NUMERICALRESPONSE: 0
WONGBAKER_NUMERICALRESPONSE: 0

## 2020-01-30 ASSESSMENT — PAIN DESCRIPTION - DESCRIPTORS: DESCRIPTORS: ACHING

## 2020-01-30 NOTE — PROGRESS NOTES
Subjective:    Pt has no complaints today    Objective:    BP (!) 147/88   Pulse 69   Temp 98.6 °F (37 °C)   Resp 18   Ht 5' 3\" (1.6 m)   Wt 134 lb (60.8 kg)   SpO2 96%   BMI 23.74 kg/m²     Current medications that patient is taking have been reviewed. General appearance: NAD, conversant. HEENT: AT/NC, MMM  Neck: FROM, supple  Lungs: Clear to auscultation  CV: RRR, no MRGs  Abdomen: Soft, non-tender; no masses or HSM, +BS  Extremities: No peripheral edema or digital cyanosis  Skin: no rash, lesions or ulcers  Psych: Calm and cooperative  Neuro: Alert and interactive, nonfocal    Labs:  CBC:   Lab Results   Component Value Date    WBC 8.7 01/28/2020    RBC 4.02 01/28/2020    HGB 12.1 01/28/2020    HCT 38.2 01/28/2020    MCV 95.0 01/28/2020    MCH 30.1 01/28/2020    MCHC 31.7 01/28/2020    RDW 13.7 01/28/2020     01/28/2020    MPV 9.8 01/28/2020     BMP:    Lab Results   Component Value Date     01/28/2020    K 3.6 01/28/2020     01/28/2020    CO2 20 01/28/2020    BUN 10 01/28/2020    LABALBU 4.2 01/27/2020    CREATININE 1.0 01/28/2020    CALCIUM 9.3 01/28/2020    GFRAA >60 01/28/2020    LABGLOM 53 01/28/2020    GLUCOSE 103 01/28/2020      UCx shows e.coli, sensitive to 3rd gen cephalosporins    Assessment/Plan:    Principal Problem:    Acute colitis  Active Problems:    Essential hypertension    Urinary tract infection    Nausea and vomiting  Resolved Problems:    * No resolved hospital problems.  *    Colitis resolving  Tolerating diet  D/c IV fluids  Continue ceftriaxone  N/V improving  PT/OT    Medically stable for d/c, looking for SNF  Jamarcus NELLY Florencia [facility]    1:06 PM  1/30/2020  Cell: [phone]

## 2020-01-30 NOTE — CARE COORDINATION
Per Melissa Robertson from [facility], precert is still pending which was started on Tuesday. I did leave a second voicemail message with patient's nephew Darryl Michele to discuss a plan B option in case the insurance denies SNF. Await call back. Completed Hens placed in envelope in soft chart.   Maurisio Hoang RN, CM

## 2020-01-31 VITALS
TEMPERATURE: 98 F | DIASTOLIC BLOOD PRESSURE: 99 MMHG | HEIGHT: 63 IN | BODY MASS INDEX: 23.74 KG/M2 | OXYGEN SATURATION: 95 % | SYSTOLIC BLOOD PRESSURE: 144 MMHG | HEART RATE: 67 BPM | RESPIRATION RATE: 16 BRPM | WEIGHT: 134 LBS

## 2020-01-31 PROCEDURE — 97535 SELF CARE MNGMENT TRAINING: CPT

## 2020-01-31 PROCEDURE — 2580000003 HC RX 258: Performed by: NURSE PRACTITIONER

## 2020-01-31 PROCEDURE — 97530 THERAPEUTIC ACTIVITIES: CPT

## 2020-01-31 PROCEDURE — 6360000002 HC RX W HCPCS: Performed by: NURSE PRACTITIONER

## 2020-01-31 PROCEDURE — 6370000000 HC RX 637 (ALT 250 FOR IP): Performed by: INTERNAL MEDICINE

## 2020-01-31 RX ADMIN — ONDANSETRON HYDROCHLORIDE 4 MG: 2 SOLUTION INTRAMUSCULAR; INTRAVENOUS at 14:04

## 2020-01-31 RX ADMIN — CEFDINIR 300 MG: 300 CAPSULE ORAL at 08:47

## 2020-01-31 RX ADMIN — SODIUM CHLORIDE, PRESERVATIVE FREE 10 ML: 5 INJECTION INTRAVENOUS at 09:48

## 2020-01-31 RX ADMIN — ONDANSETRON HYDROCHLORIDE 4 MG: 2 SOLUTION INTRAMUSCULAR; INTRAVENOUS at 10:47

## 2020-01-31 ASSESSMENT — PAIN DESCRIPTION - PROGRESSION: CLINICAL_PROGRESSION: NOT CHANGED

## 2020-01-31 ASSESSMENT — PAIN SCALES - GENERAL: PAINLEVEL_OUTOF10: 0

## 2020-01-31 ASSESSMENT — PAIN SCALES - WONG BAKER: WONGBAKER_NUMERICALRESPONSE: 0

## 2020-01-31 NOTE — CARE COORDINATION
Per Gaviota Alonso from [facility], precert has been obtained. Johana set up transportation via [employer] for 5 pm today. Charge nurse, discharge nurse, patient and nephew Dc Adorno all notified. Completed Hens and ambulette form placed in envelope in soft chart.  Await discharge order from Dr. Donald Coronel RN CM

## 2020-01-31 NOTE — DISCHARGE SUMMARY
Physician Discharge Summary     Patient ID:  Adrianna Cota  [id number]  [de-identified] y.o.  1940    Admit date: 1/27/2020    Discharge date and time:  01/31/20 5 PM    Admission Diagnoses:   Nausea, vomiting, dysuria    Discharge Diagnoses:   Principal Problem:    Acute colitis  Active Problems:    Essential hypertension    Urinary tract infection    Nausea and vomiting  Resolved Problems:    * No resolved hospital problems. *       Consults: none    Procedures: none    Hospital Course:   Patient presents with nausea and vomiting. CT scan shows mild colitis. She improved with supportive care only. Most likely she had a mild viral infection. She was given IV fluids and antiemetics. She complained of dysuria and UA confirmed infection. Urine culture grew >100k CFU e.coli. She received 5 days of ceftriaxone/cefdinir and has completed her antibiotic course here in the hospital.    Her home situation is problematic because she is frail, and lives with her even more frail older sister. She has trouble with simple ADLs like climbing the stairs. She will be discharged to SNF for rehab. CT A/P  Mild relatively diffuse wall thickening of the colon. This suggests   inflammatory or infectious colitis of a mild degree. There is   secondary  adynamic ileus suggested with some dilated small bowel   loops.  There is no evidence of obstruction.       Mild left-sided colonic diverticulosis without diverticulitis         Discharge Exam:  Vitals:    01/29/20 0743 01/29/20 2345 01/30/20 0845 01/30/20 1945   BP: (!) 147/59 (!) 188/74 (!) 147/88 (!) 140/66   Pulse: 61 57 69 74   Resp: 16 17 18 16   Temp: 98.5 °F (36.9 °C) 98.6 °F (37 °C) 98.6 °F (37 °C) 97.8 °F (36.6 °C)   TempSrc: Temporal Oral  Temporal   SpO2:  96%  95%   Weight:       Height:            General appearance: Super, super pleasant female in NAD.    HEENT: AT/NC, MMM  Neck: FROM, supple  Lungs: Clear to auscultation  CV: RRR, no MRGs  Abdomen: Soft, non-tender; no masses or HSM, +BS  Extremities: No peripheral edema or digital cyanosis  Skin: no rash, lesions or ulcers  Psych: Calm and cooperative  Neuro: Alert and interactive, nonfocal    Condition: Stable    Disposition: [facility]    Patient Instructions:   Current Discharge Medication List      CONTINUE these medications which have NOT CHANGED    Details   donepezil (ARICEPT) 5 MG tablet Take 5 mg by mouth nightly      acetaminophen (TYLENOL) 325 MG tablet Take 325 mg by mouth every 6 hours as needed for Pain      citalopram (CELEXA) 20 MG tablet Take 20 mg by mouth daily      diphenhydrAMINE (BENADRYL) 25 MG capsule Take 25 mg by mouth nightly as needed for Sleep         STOP taking these medications       ondansetron (ZOFRAN ODT) 4 MG disintegrating tablet Comments:   Reason for Stopping:             Activity: activity as tolerated  Diet: regular diet    Follow-up with PCP in 1 week after d/c from [facility].     Signed:  Chay Maier    1/31/2020  3:55 PM

## 2020-01-31 NOTE — DISCHARGE INSTR - COC
Continuity of Care Form    Patient Name: Inés Dumont   :  1940  MRN:  [id number]    Admit date:  2020  Discharge date:  ***    Code Status Order: Full Code   Advance Directives:   Advance Care Flowsheet Documentation     Date/Time Healthcare Directive Type of Healthcare Directive Copy in [address] Agent's Name Healthcare Agent's Phone Number    20 1305  No, patient does not have an advance directive for healthcare treatment -- -- -- -- --          Admitting Physician:  Spencer Vieyra MD  PCP: Brenda Rea III, DO    Discharging Nurse: [address] [place] [address] Unit/Room#: 8473/0692-Z  Discharging Unit Phone Number: [phone]    Emergency Contact:   Extended Emergency Contact Information  Primary Emergency Contact: [address] Phone: [phone]  Mobile Phone: [phone]  Relation: Brother/Sister  Preferred language: English   needed? No  Secondary Emergency Contact: dayan desir  Mobile Phone: [phone]  Relation: Niece/Nephew  Preferred language: English   needed? No    Past Surgical History:  History reviewed. No pertinent surgical history. Immunization History: There is no immunization history on file for this patient.     Active Problems:  Patient Active Problem List   Diagnosis Code    Essential hypertension I10    Acute colitis K52.9    Urinary tract infection N39.0    Nausea and vomiting R11.2       Isolation/Infection:   Isolation          No Isolation        Patient Infection Status     None to display          Nurse Assessment:  Last Vital Signs: BP (!) 140/66   Pulse 74   Temp 97.8 °F (36.6 °C) (Temporal)   Resp 16   Ht 5' 3\" (1.6 m)   Wt 134 lb (60.8 kg)   SpO2 95%   BMI 23.74 kg/m²     Last documented pain score (0-10 scale): Pain Level: 3  Last Weight:   Wt Readings from Last 1 Encounters:   20 134 lb (60.8 kg)     Mental Status:  disoriented and alert    IV Access:  - None    Nursing Mobility/ADLs:  Walking   Assisted  Transfer  Assisted  Bathing  Assisted  Dressing  Assisted  Toileting  Assisted  Feeding  Assisted  Med Admin  Assisted  Med Delivery   whole    Wound Care Documentation and Therapy:        Elimination:  Continence:   · Bowel: yes  · Bladder: Yes  Urinary Catheter: None   Colostomy/Ileostomy/Ileal Conduit: No       Date of Last BM: 1 29 20    Intake/Output Summary (Last 24 hours) at 1/31/2020 1009  Last data filed at 1/31/2020 0506  Gross per 24 hour   Intake --   Output 1 ml   Net -1 ml     I/O last 3 completed shifts:  In: -   Out: 1 [Urine:1]    Safety Concerns: At Risk for Falls    Impairments/Disabilities:      Vision    Nutrition Therapy:  Current Nutrition Therapy:   - Oral Diet:  General    Routes of Feeding: Oral  Liquids: Thin Liquids  Daily Fluid Restriction: no  Last Modified Barium Swallow with Video (Video Swallowing Test): not done    Treatments at the Time of Hospital Discharge:   Respiratory Treatments: none  Oxygen Therapy:  is not on home oxygen therapy.   Ventilator:    - No ventilator support    Rehab Therapies: Physical Therapy and Occupational Therapy  Weight Bearing Status/Restrictions: No weight bearing restirctions  Other Medical Equipment (for information only, NOT a DME order):  walker, bedside commode and hospital bed  Other Treatments: none    Patient's personal belongings (please select all that are sent with patient):  None    RN SIGNATURE:  Electronically signed by Sarbjit Gomez RN on 1/31/20 at 10:18 AM    CASE MANAGEMENT/SOCIAL WORK SECTION    Inpatient Status Date: ***    Readmission Risk Assessment Score:  Readmission Risk              Risk of Unplanned Readmission:        9           Discharging to Facility/ Agency   · Name:   · Address:  · Phone:  · Fax:    Dialysis Facility (if applicable)   · Name:  · Address:  · Dialysis Schedule:  · Phone:  · Fax:    / signature: {Esignature:[id number]}    PHYSICIAN SECTION    Prognosis: Good    Condition at Discharge: Stable    Rehab Potential (if transferring to Rehab): Good    Recommended Labs or Other Treatments After Discharge: None    Physician Certification: I certify the above information and transfer of Day Faye  is necessary for the continuing treatment of the diagnosis listed and that she requires [place] for less 30 days.      Update Admission H&P: No change in H&P    PHYSICIAN SIGNATURE:  Electronically signed by Bernardino Lea MD on 1/31/20 at 3:55 PM

## 2020-01-31 NOTE — CARE COORDINATION
I called Lew phone# [phone] to check the status of the precert to [facility]. I spoke to Jean Pierre Goldman. Reference # for the call is [id number]. He said that the precert is still under review and he would have a nurse reviewer call me back today. I left a third voicemail message with patient's nephew Phil Bautista to discuss a plan B option in case the insurance denies SNF. Awaiting call back.  Completed Hens and ambulette form placed in envelope in soft chart.  Maria Ines Brantley RN CM

## 2020-02-27 PROBLEM — N39.0 URINARY TRACT INFECTION: Status: RESOLVED | Noted: 2020-01-28 | Resolved: 2020-02-27

## 2020-07-18 ENCOUNTER — APPOINTMENT (OUTPATIENT)
Dept: CT IMAGING | Age: 80
End: 2020-07-18
Payer: MEDICARE

## 2020-07-18 ENCOUNTER — HOSPITAL ENCOUNTER (EMERGENCY)
Age: 80
Discharge: HOME | End: 2020-07-18
Attending: EMERGENCY MEDICINE
Payer: MEDICARE

## 2020-07-18 ENCOUNTER — APPOINTMENT (OUTPATIENT)
Dept: GENERAL RADIOLOGY | Age: 80
End: 2020-07-18
Payer: MEDICARE

## 2020-07-18 VITALS
SYSTOLIC BLOOD PRESSURE: 163 MMHG | WEIGHT: 134 LBS | BODY MASS INDEX: 23.74 KG/M2 | OXYGEN SATURATION: 97 % | TEMPERATURE: 97.8 F | HEIGHT: 63 IN | HEART RATE: 55 BPM | RESPIRATION RATE: 20 BRPM | DIASTOLIC BLOOD PRESSURE: 53 MMHG

## 2020-07-18 PROCEDURE — 72125 CT NECK SPINE W/O DYE: CPT

## 2020-07-18 PROCEDURE — 73030 X-RAY EXAM OF SHOULDER: CPT

## 2020-07-18 PROCEDURE — 90715 TDAP VACCINE 7 YRS/> IM: CPT | Performed by: EMERGENCY MEDICINE

## 2020-07-18 PROCEDURE — 2500000003 HC RX 250 WO HCPCS: Performed by: EMERGENCY MEDICINE

## 2020-07-18 PROCEDURE — 70450 CT HEAD/BRAIN W/O DYE: CPT

## 2020-07-18 PROCEDURE — 6360000002 HC RX W HCPCS: Performed by: EMERGENCY MEDICINE

## 2020-07-18 PROCEDURE — 99284 EMERGENCY DEPT VISIT MOD MDM: CPT

## 2020-07-18 PROCEDURE — 12002 RPR S/N/AX/GEN/TRNK2.6-7.5CM: CPT

## 2020-07-18 PROCEDURE — 90471 IMMUNIZATION ADMIN: CPT | Performed by: EMERGENCY MEDICINE

## 2020-07-18 RX ORDER — LIDOCAINE HYDROCHLORIDE AND EPINEPHRINE 10; 10 MG/ML; UG/ML
20 INJECTION, SOLUTION INFILTRATION; PERINEURAL ONCE
Status: COMPLETED | OUTPATIENT
Start: 2020-07-18 | End: 2020-07-18

## 2020-07-18 RX ADMIN — TETANUS TOXOID, REDUCED DIPHTHERIA TOXOID AND ACELLULAR PERTUSSIS VACCINE, ADSORBED 0.5 ML: 5; 2.5; 8; 8; 2.5 SUSPENSION INTRAMUSCULAR at 00:37

## 2020-07-18 RX ADMIN — LIDOCAINE HYDROCHLORIDE,EPINEPHRINE BITARTRATE 20 ML: 10; .01 INJECTION, SOLUTION INFILTRATION; PERINEURAL at 00:38

## 2020-07-18 ASSESSMENT — PAIN DESCRIPTION - ORIENTATION: ORIENTATION: RIGHT

## 2020-07-18 ASSESSMENT — PAIN SCALES - GENERAL
PAINLEVEL_OUTOF10: 0
PAINLEVEL_OUTOF10: 4

## 2020-07-18 ASSESSMENT — PAIN DESCRIPTION - PAIN TYPE: TYPE: ACUTE PAIN

## 2020-07-18 ASSESSMENT — PAIN DESCRIPTION - LOCATION: LOCATION: SHOULDER

## 2020-07-18 ASSESSMENT — PAIN DESCRIPTION - DESCRIPTORS: DESCRIPTORS: ACHING

## 2020-07-18 ASSESSMENT — PAIN DESCRIPTION - ONSET: ONSET: SUDDEN

## 2020-07-18 ASSESSMENT — PAIN DESCRIPTION - FREQUENCY: FREQUENCY: CONTINUOUS

## 2020-07-18 NOTE — ED PROVIDER NOTES
Department of Emergency Medicine   ED  Provider Note  Admit Date/RoomTime: 7/18/2020 12:17 AM  ED Room: 15/15          History of Present Illness:  7/18/20, Time: 12:26 AM EDT  Chief Complaint   Patient presents with    Fall     Unwitnessed fall in room. Pt has lac to right side of head. Also c/o right shoulder pain. Pt on ASA daily                Prince Cruz is a 79 y.o. female presenting to the ED for fall. Patient mechanical fall at her nursing facility. Did strike her head, no loss of conscious. She is on no anticoagulation. She only complains of right shoulder pain at this time. Achy sensation, nothing makes better or worse, does not radiate anywherer. She still has range of motion in her shoulder. She denies head pain, neck, nausea, vomit, blurred vision, paresthesias, chest, back pain, abdominal pain, pain in the hips, or any other symptoms or complaints. Review of Systems:   Pertinent positives and negatives are stated within HPI, all other systems reviewed and are negative.        --------------------------------------------- PAST HISTORY ---------------------------------------------  Past Medical History:  has no past medical history on file. Past Surgical History:  has no past surgical history on file. Social History:  reports that she has never smoked. She has never used smokeless tobacco. She reports that she does not drink alcohol or use drugs. Family History: family history is not on file. . Unless otherwise noted, family history is non contributory    The patients home medications have been reviewed. Allergies: Patient has no known allergies.         ---------------------------------------------------PHYSICAL EXAM--------------------------------------    Constitutional/General: Alert and oriented x3  Head: Normocephalic with a 3 cm laceration to the right parietal area  Eyes: PERRL, EOMI, sclera non icteric  Mouth: Oropharynx clear, handling secretions, no trismus, no asymmetry of the posterior oropharynx or uvular edema  Neck: Supple, full ROM, no stridor, no meningeal signs  Respiratory: Lungs clear to auscultation bilaterally, no wheezes, rales, or rhonchi. Not in respiratory distress  Cardiovascular:  Regular rate. Regular rhythm. 2+ distal pulses. Equal extremity pulses. Chest: No chest wall tenderness  GI:  Abdomen Soft, Non tender, Non distended. No rebound, guarding, or rigidity. No pulsatile masses. Musculoskeletal: Moves all extremities x 4. Warm and well perfused, no clubbing, cyanosis, or edema. Capillary refill <3 seconds  Integument: skin warm and dry. No rashes. Neurologic: GCS 15, no focal deficits, symmetric strength 5/5 in the upper and lower extremities bilaterally  Psychiatric: Normal Affect          -------------------------------------------------- RESULTS -------------------------------------------------  I have personally reviewed all laboratory and imaging results for this patient. Results are listed below. LABS: (Lab results interpreted by me)  No results found for this visit on 07/18/20.,       RADIOLOGY:  Interpreted by Radiologist unless otherwise specified  CT Head WO Contrast   Final Result   1. No acute intracranial process. There is a left frontal scalp hematoma, but    no underlying fracture or intracranial hemorrhage. 2. Stable encephalomalacia involving the bilateral frontal lobes and left    cerebellum. 3. Diffuse cortical atrophy and chronic deep white matter small vessel disease. This report has been electronically signed by Danie Looney MD.      CT Cervical Spine WO Contrast   Final Result   No acute process or fracture. There is spondyloarthropathy and degenerative    disc disease. This report has been electronically signed by Danie Looney MD.      XR SHOULDER RIGHT (MIN 2 VIEWS)   Final Result      No evidence of fracture or dislocation of the shoulder.                                     EKG addition to providing specific details for the plan of care and counseling regarding the diagnosis and prognosis. Questions are answered at this time and they are agreeable with the plan.       --------------------------------- IMPRESSION AND DISPOSITION ---------------------------------    IMPRESSION  1. Fall, initial encounter    2. Laceration of scalp, initial encounter        DISPOSITION  Disposition: Discharge to home  Patient condition is stable        NOTE: This report was transcribed using voice recognition software.  Every effort was made to ensure accuracy; however, inadvertent computerized transcription errors may be present       Jael Acosta MD  07/18/20 7320

## 2020-07-18 NOTE — ED NOTES
Swelling above left eye with dry blood to right top of head from fall. Complaints of right shoulder pain.  A/ox3       Sam Dick RN  07/18/20 5918

## 2021-01-01 NOTE — PROGRESS NOTES
for per-care, otherwise completed bathing tasks seated  Supervision    Toileting Minimal Assist   SBA  Simulated, declined need Supervision    Bed Mobility  Supine to sit: Stand by Assist   Sit to supine: Stand by Assist   Supervision  Supine to sit  Rolling: Independent   Supine to sit: Modified [place]   Sit to supine: Modified [place]    Functional Transfers SBA  Various surfaces  CGA  Different surfaces Supervision   Functional Mobility SBA w/ ww  Min A w/o AD  CGA  Without walker, less than household distances  Supervision   Balance Sitting: SBA  Standing: SBA w/ ww  Min A w/o AD Sitting: SBA  Standing: CGA  Without AD  (Defer to PT for AD, if needed for longer distances)     Activity Tolerance Fair  Fair  No SOB, mild fatigue at end of ADL tasks  Good   Visual/  Perceptual Glasses: yes, not present  Vision - WFL            Education:  Pt was educated through out treatment regarding proper technique & safety with functional transfers, mobility & safety during ADL tasks along with modified/compensatory strategies to ease tasks to improve safety & prevent falls and allow pt to return home safely. Comments: Upon arrival pt was in bed & agreeable for therapy. At end of session pt was seated in chair, nsg aware, all lines and tubes intact & call light within reach. Nsg declined need for chair alarm, pt instructed to use call light when needing to get up with Good understanding. · Pt has made Fair+ progress towards set goals. · Continue with current plan of care      Treatment Time In: 8:00am       Treatment Time Out: 8:44am            Visit #: 2  Treatment Charges: Mins Units   Ther Ex  30261     Manual Therapy Todd Sr [phone] [phone]   ADL/Home Mgt [id number] [phone] 2   Neuro Re-ed 89589     Group Therapy      Orthotic manage/training  06220     Non-Billable Time     Total Timed Treatment 44 3       Rena GOLDMAN  [address] [place] [address], [address] [place] [address] 2021

## 2021-09-05 ENCOUNTER — APPOINTMENT (OUTPATIENT)
Dept: CT IMAGING | Age: 81
End: 2021-09-05
Payer: MEDICARE

## 2021-09-05 ENCOUNTER — APPOINTMENT (OUTPATIENT)
Dept: GENERAL RADIOLOGY | Age: 81
End: 2021-09-05
Payer: MEDICARE

## 2021-09-05 ENCOUNTER — HOSPITAL ENCOUNTER (OUTPATIENT)
Age: 81
Setting detail: OBSERVATION
Discharge: NURSING, REHAB OR LONG-TERM CARE | End: 2021-09-09
Attending: EMERGENCY MEDICINE | Admitting: INTERNAL MEDICINE
Payer: MEDICARE

## 2021-09-05 DIAGNOSIS — K80.20 GALLSTONES: ICD-10-CM

## 2021-09-05 DIAGNOSIS — R41.82 ALTERED MENTAL STATUS, UNSPECIFIED ALTERED MENTAL STATUS TYPE: ICD-10-CM

## 2021-09-05 DIAGNOSIS — R11.2 NAUSEA AND VOMITING, INTRACTABILITY OF VOMITING NOT SPECIFIED, UNSPECIFIED VOMITING TYPE: Primary | ICD-10-CM

## 2021-09-05 LAB
ALBUMIN SERPL-MCNC: 4.4 G/DL (ref 3.5–5.2)
ALP BLD-CCNC: 88 U/L (ref 35–104)
ALT SERPL-CCNC: 11 U/L (ref 0–32)
ANION GAP SERPL CALCULATED.3IONS-SCNC: 19 MMOL/L (ref 7–16)
AST SERPL-CCNC: 20 U/L (ref 0–31)
BASOPHILS ABSOLUTE: 0.05 E9/L (ref 0–0.2)
BASOPHILS RELATIVE PERCENT: 0.4 % (ref 0–2)
BILIRUB SERPL-MCNC: 0.3 MG/DL (ref 0–1.2)
BUN BLDV-MCNC: 18 MG/DL (ref 6–23)
CALCIUM SERPL-MCNC: 9.5 MG/DL (ref 8.6–10.2)
CHLORIDE BLD-SCNC: 102 MMOL/L (ref 98–107)
CO2: 18 MMOL/L (ref 22–29)
CREAT SERPL-MCNC: 0.9 MG/DL (ref 0.5–1)
EOSINOPHILS ABSOLUTE: 0.05 E9/L (ref 0.05–0.5)
EOSINOPHILS RELATIVE PERCENT: 0.4 % (ref 0–6)
GFR AFRICAN AMERICAN: >60
GFR NON-AFRICAN AMERICAN: 60 ML/MIN/1.73
GLUCOSE BLD-MCNC: 245 MG/DL (ref 74–99)
HCT VFR BLD CALC: 40.5 % (ref 34–48)
HEMOGLOBIN: 13 G/DL (ref 11.5–15.5)
IMMATURE GRANULOCYTES #: 0.08 E9/L
IMMATURE GRANULOCYTES %: 0.6 % (ref 0–5)
LIPASE: 50 U/L (ref 13–60)
LYMPHOCYTES ABSOLUTE: 2.31 E9/L (ref 1.5–4)
LYMPHOCYTES RELATIVE PERCENT: 18.1 % (ref 20–42)
MCH RBC QN AUTO: 30.7 PG (ref 26–35)
MCHC RBC AUTO-ENTMCNC: 32.1 % (ref 32–34.5)
MCV RBC AUTO: 95.7 FL (ref 80–99.9)
MONOCYTES ABSOLUTE: 1.13 E9/L (ref 0.1–0.95)
MONOCYTES RELATIVE PERCENT: 8.8 % (ref 2–12)
NEUTROPHILS ABSOLUTE: 9.16 E9/L (ref 1.8–7.3)
NEUTROPHILS RELATIVE PERCENT: 71.7 % (ref 43–80)
PDW BLD-RTO: 13.3 FL (ref 11.5–15)
PLATELET # BLD: 275 E9/L (ref 130–450)
PMV BLD AUTO: 10.1 FL (ref 7–12)
POTASSIUM SERPL-SCNC: 3.7 MMOL/L (ref 3.5–5)
RBC # BLD: 4.23 E12/L (ref 3.5–5.5)
SODIUM BLD-SCNC: 139 MMOL/L (ref 132–146)
TOTAL PROTEIN: 7.4 G/DL (ref 6.4–8.3)
TROPONIN, HIGH SENSITIVITY: 10 NG/L (ref 0–9)
WBC # BLD: 12.8 E9/L (ref 4.5–11.5)

## 2021-09-05 PROCEDURE — 74176 CT ABD & PELVIS W/O CONTRAST: CPT

## 2021-09-05 PROCEDURE — 71045 X-RAY EXAM CHEST 1 VIEW: CPT

## 2021-09-05 PROCEDURE — 93005 ELECTROCARDIOGRAM TRACING: CPT | Performed by: EMERGENCY MEDICINE

## 2021-09-05 PROCEDURE — 99285 EMERGENCY DEPT VISIT HI MDM: CPT

## 2021-09-05 PROCEDURE — 80053 COMPREHEN METABOLIC PANEL: CPT

## 2021-09-05 PROCEDURE — 70450 CT HEAD/BRAIN W/O DYE: CPT

## 2021-09-05 PROCEDURE — 85025 COMPLETE CBC W/AUTO DIFF WBC: CPT

## 2021-09-05 PROCEDURE — 84484 ASSAY OF TROPONIN QUANT: CPT

## 2021-09-05 PROCEDURE — 83690 ASSAY OF LIPASE: CPT

## 2021-09-05 PROCEDURE — 2580000003 HC RX 258: Performed by: EMERGENCY MEDICINE

## 2021-09-05 RX ORDER — MIRTAZAPINE 15 MG/1
15 TABLET, FILM COATED ORAL NIGHTLY
COMMUNITY

## 2021-09-05 RX ORDER — ONDANSETRON 4 MG/1
4 TABLET, FILM COATED ORAL EVERY 8 HOURS PRN
COMMUNITY

## 2021-09-05 RX ORDER — DIVALPROEX SODIUM 250 MG/1
250 TABLET, DELAYED RELEASE ORAL 3 TIMES DAILY
COMMUNITY

## 2021-09-05 RX ORDER — LORATADINE 10 MG/1
10 TABLET ORAL DAILY
Status: ON HOLD | COMMUNITY
End: 2021-09-13 | Stop reason: HOSPADM

## 2021-09-05 RX ORDER — MULTIVIT-MIN/IRON/FOLIC ACID/K 18-600-40
2000 CAPSULE ORAL DAILY
COMMUNITY

## 2021-09-05 RX ORDER — IBUPROFEN 600 MG/1
600 TABLET ORAL EVERY 6 HOURS PRN
Status: ON HOLD | COMMUNITY
End: 2021-09-13 | Stop reason: HOSPADM

## 2021-09-05 RX ORDER — SODIUM CHLORIDE 9 MG/ML
INJECTION, SOLUTION INTRAVENOUS CONTINUOUS
Status: DISCONTINUED | OUTPATIENT
Start: 2021-09-05 | End: 2021-09-07

## 2021-09-05 RX ORDER — LOPERAMIDE HYDROCHLORIDE 2 MG/1
2 CAPSULE ORAL 4 TIMES DAILY PRN
COMMUNITY

## 2021-09-05 RX ORDER — DOCUSATE SODIUM 100 MG/1
100 CAPSULE, LIQUID FILLED ORAL 2 TIMES DAILY
COMMUNITY

## 2021-09-05 RX ADMIN — SODIUM CHLORIDE: 9 INJECTION, SOLUTION INTRAVENOUS at 21:12

## 2021-09-05 NOTE — LETTER
[place] Department Medicaid  CERTIFICATION OF NECESSITY  FOR NON-EMERGENCY TRANSPORTATION   BY GROUND AMBULANCE      Individual Information   1. Name: Tomer Goodman 2. [place] Medicaid Billing Number:    3. Address:  [facility] Provider Information   4. Provider Name: Michelle Cortez    5. [place] [employer] Provider Number:  National Provider Identifier (NPI):      Certification  7. Criteria:  During transport, this individual requires:  [x] Medical treatment or continuous     supervision by an EMT. [] The administration or regulation of oxygen by another person. [] Supervised protective restraint. 8. Period Beginning Date:    5. Length  [x] Not more than 2 day(s)  [] One Year     Additional Information Relevant to Certification   10. Comments or Explanations, If Necessary or Appropriate   ALTERED MENTAL STATUS/DEMENTIA      Certifying Practitioner Information   11. Name of Practitioner: DR Tim Schneider    12. [facility] Provider Number, If Applicable:  [address] Provider Identifier (NPI):      Signature Information   14. Date of Signature: 2021 15. Name of Person Signing: Electronically signed by Juan Hanks RN on 2021 at 1:13 PM     16. Signature and Professional Designation: DR Ivania VILLAGOMEZ/Electronically signed by Juan Hanks RN  DISCHARGE PLANNER on 2021 at 1:14 PM       [facility] [address]  Rev. 2015                      [address] [place] [address] Admission Date/Time: 21   Hospital Account: [de-identified]    MRN: [id number]    Patient:  Tomer Goodman   Contact Serial #: [id number]            ENCOUNTER          Patient Class: Observation Private Enc?   No Unit RM BD: SEYZ 8S [facility] 8208/8208-B   Hospital Service: Intermediate   ADM DX: Altered mental status [R*   ADM Provider: Nelson Marcano MD   Procedure:     ATT Provider: Nelson Marcano MD   REF Provider:        PATIENT                 Name: Tomer Goodman : 1940 (79 yrs)   Address:

## 2021-09-06 ENCOUNTER — APPOINTMENT (OUTPATIENT)
Dept: ULTRASOUND IMAGING | Age: 81
End: 2021-09-06
Payer: MEDICARE

## 2021-09-06 PROBLEM — K80.20 CHOLELITHIASES: Status: ACTIVE | Noted: 2021-09-06

## 2021-09-06 PROBLEM — F32.A DEPRESSION: Status: ACTIVE | Noted: 2021-09-06

## 2021-09-06 PROBLEM — I10 HTN (HYPERTENSION): Status: ACTIVE | Noted: 2021-09-06

## 2021-09-06 PROBLEM — D72.829 LEUKOCYTOSIS: Status: ACTIVE | Noted: 2021-09-06

## 2021-09-06 PROBLEM — R41.82 ALTERED MENTAL STATUS: Status: ACTIVE | Noted: 2021-09-06

## 2021-09-06 PROBLEM — F03.90 DEMENTIA (HCC): Status: ACTIVE | Noted: 2021-09-06

## 2021-09-06 PROBLEM — B02.9 SHINGLES: Status: ACTIVE | Noted: 2021-09-06

## 2021-09-06 LAB
ANION GAP SERPL CALCULATED.3IONS-SCNC: 11 MMOL/L (ref 7–16)
BACTERIA: NORMAL /HPF
BILIRUBIN URINE: NEGATIVE
BLOOD, URINE: ABNORMAL
BUN BLDV-MCNC: 14 MG/DL (ref 6–23)
CALCIUM SERPL-MCNC: 9.4 MG/DL (ref 8.6–10.2)
CHLORIDE BLD-SCNC: 107 MMOL/L (ref 98–107)
CHOLESTEROL, TOTAL: 166 MG/DL (ref 0–199)
CLARITY: CLEAR
CO2: 25 MMOL/L (ref 22–29)
COLOR: YELLOW
CREAT SERPL-MCNC: 0.9 MG/DL (ref 0.5–1)
EKG ATRIAL RATE: 95 BPM
EKG P AXIS: 35 DEGREES
EKG P-R INTERVAL: 184 MS
EKG Q-T INTERVAL: 372 MS
EKG QRS DURATION: 108 MS
EKG QTC CALCULATION (BAZETT): 467 MS
EKG R AXIS: -26 DEGREES
EKG T AXIS: 37 DEGREES
EKG VENTRICULAR RATE: 95 BPM
GFR AFRICAN AMERICAN: >60
GFR NON-AFRICAN AMERICAN: 60 ML/MIN/1.73
GLUCOSE BLD-MCNC: 105 MG/DL (ref 74–99)
GLUCOSE URINE: 250 MG/DL
HBA1C MFR BLD: 5.6 % (ref 4–5.6)
HCT VFR BLD CALC: 39.5 % (ref 34–48)
HDLC SERPL-MCNC: 44 MG/DL
HEMOGLOBIN: 12.8 G/DL (ref 11.5–15.5)
KETONES, URINE: 40 MG/DL
LACTIC ACID: 2.6 MMOL/L (ref 0.5–2.2)
LDL CHOLESTEROL CALCULATED: 97 MG/DL (ref 0–99)
LEUKOCYTE ESTERASE, URINE: NEGATIVE
MCH RBC QN AUTO: 30.5 PG (ref 26–35)
MCHC RBC AUTO-ENTMCNC: 32.4 % (ref 32–34.5)
MCV RBC AUTO: 94 FL (ref 80–99.9)
METER GLUCOSE: 97 MG/DL (ref 74–99)
NITRITE, URINE: NEGATIVE
PDW BLD-RTO: 13.2 FL (ref 11.5–15)
PH UA: 7.5 (ref 5–9)
PLATELET # BLD: 286 E9/L (ref 130–450)
PMV BLD AUTO: 9.8 FL (ref 7–12)
POTASSIUM REFLEX MAGNESIUM: 4.3 MMOL/L (ref 3.5–5)
PROTEIN UA: 100 MG/DL
RBC # BLD: 4.2 E12/L (ref 3.5–5.5)
RBC UA: NORMAL /HPF (ref 0–2)
SODIUM BLD-SCNC: 143 MMOL/L (ref 132–146)
SPECIFIC GRAVITY UA: 1.02 (ref 1–1.03)
TRIGL SERPL-MCNC: 123 MG/DL (ref 0–149)
TROPONIN, HIGH SENSITIVITY: 22 NG/L (ref 0–9)
UROBILINOGEN, URINE: 0.2 E.U./DL
VLDLC SERPL CALC-MCNC: 25 MG/DL
WBC # BLD: 10.3 E9/L (ref 4.5–11.5)
WBC UA: NORMAL /HPF (ref 0–5)

## 2021-09-06 PROCEDURE — 82962 GLUCOSE BLOOD TEST: CPT

## 2021-09-06 PROCEDURE — 85027 COMPLETE CBC AUTOMATED: CPT

## 2021-09-06 PROCEDURE — 2580000003 HC RX 258: Performed by: EMERGENCY MEDICINE

## 2021-09-06 PROCEDURE — 80048 BASIC METABOLIC PNL TOTAL CA: CPT

## 2021-09-06 PROCEDURE — 6360000002 HC RX W HCPCS: Performed by: EMERGENCY MEDICINE

## 2021-09-06 PROCEDURE — 76705 ECHO EXAM OF ABDOMEN: CPT

## 2021-09-06 PROCEDURE — G0378 HOSPITAL OBSERVATION PER HR: HCPCS

## 2021-09-06 PROCEDURE — 83605 ASSAY OF LACTIC ACID: CPT

## 2021-09-06 PROCEDURE — 6370000000 HC RX 637 (ALT 250 FOR IP): Performed by: NURSE PRACTITIONER

## 2021-09-06 PROCEDURE — 81001 URINALYSIS AUTO W/SCOPE: CPT

## 2021-09-06 PROCEDURE — 83036 HEMOGLOBIN GLYCOSYLATED A1C: CPT

## 2021-09-06 PROCEDURE — 96374 THER/PROPH/DIAG INJ IV PUSH: CPT

## 2021-09-06 PROCEDURE — 2580000003 HC RX 258: Performed by: NURSE PRACTITIONER

## 2021-09-06 PROCEDURE — 36415 COLL VENOUS BLD VENIPUNCTURE: CPT

## 2021-09-06 PROCEDURE — 80061 LIPID PANEL: CPT

## 2021-09-06 PROCEDURE — 84484 ASSAY OF TROPONIN QUANT: CPT

## 2021-09-06 RX ORDER — SODIUM CHLORIDE 0.9 % (FLUSH) 0.9 %
5-40 SYRINGE (ML) INJECTION PRN
Status: DISCONTINUED | OUTPATIENT
Start: 2021-09-06 | End: 2021-09-09 | Stop reason: HOSPADM

## 2021-09-06 RX ORDER — DIVALPROEX SODIUM 250 MG/1
250 TABLET, DELAYED RELEASE ORAL 3 TIMES DAILY
Status: DISCONTINUED | OUTPATIENT
Start: 2021-09-06 | End: 2021-09-09 | Stop reason: HOSPADM

## 2021-09-06 RX ORDER — SODIUM CHLORIDE 0.9 % (FLUSH) 0.9 %
5-40 SYRINGE (ML) INJECTION EVERY 12 HOURS SCHEDULED
Status: DISCONTINUED | OUTPATIENT
Start: 2021-09-06 | End: 2021-09-09 | Stop reason: HOSPADM

## 2021-09-06 RX ORDER — ACETAMINOPHEN 325 MG/1
650 TABLET ORAL EVERY 6 HOURS PRN
Status: DISCONTINUED | OUTPATIENT
Start: 2021-09-06 | End: 2021-09-09 | Stop reason: HOSPADM

## 2021-09-06 RX ORDER — ASPIRIN 81 MG/1
81 TABLET ORAL DAILY
COMMUNITY

## 2021-09-06 RX ORDER — MIRTAZAPINE 15 MG/1
15 TABLET, FILM COATED ORAL NIGHTLY
Status: DISCONTINUED | OUTPATIENT
Start: 2021-09-06 | End: 2021-09-09 | Stop reason: HOSPADM

## 2021-09-06 RX ORDER — LORATADINE 10 MG/1
10 TABLET ORAL DAILY
Status: DISCONTINUED | OUTPATIENT
Start: 2021-09-06 | End: 2021-09-06

## 2021-09-06 RX ORDER — ACETAMINOPHEN 650 MG/1
650 SUPPOSITORY RECTAL EVERY 6 HOURS PRN
Status: DISCONTINUED | OUTPATIENT
Start: 2021-09-06 | End: 2021-09-09 | Stop reason: HOSPADM

## 2021-09-06 RX ORDER — DONEPEZIL HYDROCHLORIDE 5 MG/1
10 TABLET, FILM COATED ORAL NIGHTLY
Status: DISCONTINUED | OUTPATIENT
Start: 2021-09-06 | End: 2021-09-09 | Stop reason: HOSPADM

## 2021-09-06 RX ORDER — CITALOPRAM 20 MG/1
20 TABLET ORAL DAILY
Status: DISCONTINUED | OUTPATIENT
Start: 2021-09-06 | End: 2021-09-09 | Stop reason: HOSPADM

## 2021-09-06 RX ORDER — CETIRIZINE HYDROCHLORIDE 10 MG/1
10 TABLET ORAL DAILY
Status: DISCONTINUED | OUTPATIENT
Start: 2021-09-06 | End: 2021-09-09 | Stop reason: HOSPADM

## 2021-09-06 RX ORDER — DOCUSATE SODIUM 100 MG/1
100 CAPSULE, LIQUID FILLED ORAL 2 TIMES DAILY
Status: DISCONTINUED | OUTPATIENT
Start: 2021-09-06 | End: 2021-09-09 | Stop reason: HOSPADM

## 2021-09-06 RX ORDER — BISACODYL 10 MG
10 SUPPOSITORY, RECTAL RECTAL DAILY PRN
Status: DISCONTINUED | OUTPATIENT
Start: 2021-09-06 | End: 2021-09-09 | Stop reason: HOSPADM

## 2021-09-06 RX ORDER — ONDANSETRON 2 MG/ML
4 INJECTION INTRAMUSCULAR; INTRAVENOUS ONCE
Status: COMPLETED | OUTPATIENT
Start: 2021-09-06 | End: 2021-09-06

## 2021-09-06 RX ORDER — PROCHLORPERAZINE EDISYLATE 5 MG/ML
5 INJECTION INTRAMUSCULAR; INTRAVENOUS EVERY 6 HOURS PRN
Status: DISCONTINUED | OUTPATIENT
Start: 2021-09-06 | End: 2021-09-09 | Stop reason: HOSPADM

## 2021-09-06 RX ORDER — SODIUM CHLORIDE 9 MG/ML
25 INJECTION, SOLUTION INTRAVENOUS PRN
Status: DISCONTINUED | OUTPATIENT
Start: 2021-09-06 | End: 2021-09-09 | Stop reason: HOSPADM

## 2021-09-06 RX ADMIN — DOCUSATE SODIUM 100 MG: 100 CAPSULE, LIQUID FILLED ORAL at 20:51

## 2021-09-06 RX ADMIN — Medication 10 ML: at 12:41

## 2021-09-06 RX ADMIN — SODIUM CHLORIDE: 9 INJECTION, SOLUTION INTRAVENOUS at 12:41

## 2021-09-06 RX ADMIN — MIRTAZAPINE 15 MG: 15 TABLET, FILM COATED ORAL at 20:51

## 2021-09-06 RX ADMIN — DIVALPROEX SODIUM 250 MG: 250 TABLET, DELAYED RELEASE ORAL at 13:38

## 2021-09-06 RX ADMIN — DIVALPROEX SODIUM 250 MG: 250 TABLET, DELAYED RELEASE ORAL at 20:51

## 2021-09-06 RX ADMIN — DONEPEZIL HYDROCHLORIDE 10 MG: 5 TABLET, FILM COATED ORAL at 20:51

## 2021-09-06 RX ADMIN — ONDANSETRON 4 MG: 2 INJECTION INTRAMUSCULAR; INTRAVENOUS at 03:59

## 2021-09-06 ASSESSMENT — PAIN SCALES - GENERAL
PAINLEVEL_OUTOF10: 0

## 2021-09-06 NOTE — ED NOTES
Linens changed for incontinence of urine and large soft BM. Shannan care performed, brief applied. VSS. NAD noted @ this time.       Rodney Engle RN  09/05/21 4910

## 2021-09-06 NOTE — H&P
[address] [facility]  History and Physical      CHIEF COMPLAINT:    Chief Complaint   Patient presents with    Emesis     Vomiting after dinner. Took some mylanta with no relief of symptoms. EMS also states a rash that appears like shingles under right arm        Patient of Petar AshfordDO presents with:  Altered mental status    History of Present Illness:   Patient states that for a couple of weeks now she has been getting intermittent epigastric nonradiating abdominal pains of vague quality which seem to get exacerbated after eating fried food. She says it does not happen immediately after eating, takes \"a while\", but relatively replicatable. She states that yesterday she had an unusually severe episode. The emesis is nonbloody. She says about 2 days ago she noticed some streaks of blood in her stool but otherwise no melena or hematochezia. Currently feels fine with no abdominal pain. REVIEW OF SYSTEMS:  Pertinent negatives are above in HPI. 10 point ROS otherwise negative. History reviewed. No pertinent past medical history. History reviewed. No pertinent surgical history. Medications Prior to Admission:    Not in a hospital admission. Note that the patient's home medications were reviewed and the above list is accurate to the best of my knowledge at the time of the exam.    Allergies:    Patient has no known allergies. Social History:    reports that she has never smoked. She has never used smokeless tobacco. She reports that she does not drink alcohol and does not use drugs.     Family History:   Gallbladder issues run in family      PHYSICAL EXAM:    Vitals:  /80   Pulse 60   Temp 97.9 °F (36.6 °C)   Resp 17   SpO2 97%       General appearance: NAD, conversant  Eyes: Sclerae anicteric, PERRLA  HEENT: AT/NC, MMM  Neck: FROM, supple, no thyromegaly  Lymph: No cervical / supraclavicular lymphadenopathy  Lungs: Clear to auscultation, WOB normal  CV: RRR, no MRGs, no lower extremity edema  Abdomen: Soft, non-tender; no masses or HSM, +BS  Extremities: FROM without synovitis. No clubbing or cyanosis of the hands. Skin: Crusted shingles rash, nontender, L upper back  Psych: Calm and cooperative. Normal judgement and insight. Simple affect  Neuro: Alert and interactive, face symmetric, speech fluent. LABS:  All labs reviewed. Of note:  CBC with Differential:    Lab Results   Component Value Date    WBC 12.8 09/05/2021    RBC 4.23 09/05/2021    HGB 13.0 09/05/2021    HCT 40.5 09/05/2021     09/05/2021    MCV 95.7 09/05/2021    MCH 30.7 09/05/2021    MCHC 32.1 09/05/2021    RDW 13.3 09/05/2021    SEGSPCT 54 03/14/2011    LYMPHOPCT 18.1 09/05/2021    MONOPCT 8.8 09/05/2021    BASOPCT 0.4 09/05/2021    MONOSABS 1.13 09/05/2021    LYMPHSABS 2.31 09/05/2021    EOSABS 0.05 09/05/2021    BASOSABS 0.05 09/05/2021     CMP:    Lab Results   Component Value Date     09/05/2021    K 3.7 09/05/2021    K 3.6 01/28/2020     09/05/2021    CO2 18 09/05/2021    BUN 18 09/05/2021    CREATININE 0.9 09/05/2021    GFRAA >60 09/05/2021    LABGLOM 60 09/05/2021    GLUCOSE 245 09/05/2021    PROT 7.4 09/05/2021    LABALBU 4.4 09/05/2021    CALCIUM 9.5 09/05/2021    BILITOT 0.3 09/05/2021    ALKPHOS 88 09/05/2021    AST 20 09/05/2021    ALT 11 09/05/2021       Imaging:  I've personally reviewed the patient's CXR  Lung hyperinflation and coarse interstitial markings.  Atherosclerotic   disease.  No acute cardiopulmonary pathology. I've personally reviewed the patient's CT head    No acute intracranial abnormality. 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      (Very significant atrophy)    I've personally reviewed the patient's CT AP  Distended gallbladder with large gallstones.  Dilatation of the common bile   duct measuring approximately 1 cm.  Consider further evaluation with biliary   ultrasound.       Sigmoid diverticulosis with no evidence of diverticulitis.       No inflammatory changes in the abdomen or pelvis. EKG:  I've personally reviewed the patient's EKG:  NSR no acute ischemic changes     ASSESSMENT/PLAN:  Principal Problem:    Nausea and vomiting  Active Problems:    Dementia (HCC)    Cholelithiases    HTN (hypertension)    Leukocytosis    Shingles  Resolved Problems:    * No resolved hospital problems.  *      N/V/epigastric abd pain after fatty foods suspicious for GB disease    Multiple gall stones seen on imaging, though LFT's WNL and exam unremarkable now    Surgery consult    Crusted over shingles rash, I think nothing needs to be done at this point    Code status: Full  Requires obs level of care  Kamilah Damon MD    7:58 AM  9/6/2021

## 2021-09-06 NOTE — CONSULTS
GENERAL SURGERY  CONSULT NOTE  9/6/2021    Physician Consulted: Dr. Eugenio Fournier  Reason for Consult: distended gallbladder  Referring Physician: Dr. Leopold Kayser is a 79 y.o. female who presents for evaluation of nausea. The patient states that each time she eats spaghetti, she has one week of post-prandial nausea and vomiting. She denies abdominal pain, diarrhea, constipation, weight change, chest pain, or shortness of breath. Her nausea is currently controlled with Zofran. She reports that she falls frequently at home, due to knee instability, and she often strikes her head. The patient has no surgical or endoscopic history. PMH significant for HTN and dementia. She takes aspirin at home but does not take other anticoagulant or antiplatelet medications. She does not smoke or drink alcohol. She has no known history of liver disease including hepatitis or HIV. No history of diabetes or kidney disease. History reviewed. No pertinent past medical history. History reviewed. No pertinent surgical history. Medications Prior to Admission:    Prior to Admission medications    Medication Sig Start Date End Date Taking?  Authorizing Provider   aspirin 81 MG EC tablet Take 81 mg by mouth daily   Yes Historical Provider, MD   docusate sodium (COLACE) 100 MG capsule Take 100 mg by mouth 2 times daily   Yes Historical Provider, MD   divalproex (DEPAKOTE) 250 MG DR tablet Take 250 mg by mouth 3 times daily   Yes Historical Provider, MD   mirtazapine (REMERON) 15 MG tablet Take 15 mg by mouth nightly   Yes Historical Provider, MD   Vitamin D, Cholecalciferol, 50 MCG (2000 UT) CAPS Take 2,000 Units by mouth daily    Yes Historical Provider, MD   donepezil (ARICEPT) 10 MG tablet Take 10 mg by mouth nightly    Yes Historical Provider, MD   bisacodyl (DULCOLAX) 5 MG EC tablet Take 5 mg by mouth daily as needed for Constipation    Historical Provider, MD   loratadine (CLARITIN) 10 MG tablet Take 10 mg by mouth daily    Historical Provider, MD   ibuprofen (ADVIL;MOTRIN) 600 MG tablet Take 600 mg by mouth every 6 hours as needed for Pain    Historical Provider, MD   loperamide (IMODIUM) 2 MG capsule Take 2 mg by mouth 4 times daily as needed for Diarrhea    Historical Provider, MD   magnesium hydroxide (MILK OF MAGNESIA) 400 MG/5ML suspension Take by mouth daily as needed for Constipation    Historical Provider, MD   ondansetron (ZOFRAN) 4 MG tablet Take 4 mg by mouth every 8 hours as needed for Nausea or Vomiting    Historical Provider, MD   acetaminophen (TYLENOL) 325 MG tablet Take 650 mg by mouth every 6 hours as needed for Pain     Historical Provider, MD   diphenhydrAMINE (BENADRYL) 25 MG capsule Take 25 mg by mouth nightly as needed for Sleep    Historical Provider, MD       No Known Allergies    History reviewed. No pertinent family history. Social History     Tobacco Use    Smoking status: Never Smoker    Smokeless tobacco: Never Used   Substance Use Topics    Alcohol use: Never    Drug use: Never         Review of Systems   Negative except as listed in the HPI       PHYSICAL EXAM:    Vitals:    09/06/21 1323   BP: 128/83   Pulse: 71   Resp: 16   Temp: 98.1 °F (36.7 °C)   SpO2: 97%       General Appearance:  awake, alert, oriented, in no acute distress  Skin:  Skin color, texture, turgor normal. No rashes or lesions. Head/face:  NCAT  Eyes:  No gross abnormalities. , PERRL, EOMI and Sclera nonicteric  Lungs/Chest:  Normal expansion. No chest wall tenderness  Heart:  Heart regular rate. No chest pain  Abdomen:  Soft, non-tender, mildly-distended. Jose sign negative. No palpable organomegaly or masses. Extremities: Extremities warm to touch, pink, with no edema. Rectal:  Rectal exam deferred.       LABS:    CBC  Recent Labs     09/06/21  0842   WBC 10.3   HGB 12.8   HCT 39.5        BMP  Recent Labs     09/06/21  0842      K 4.3      CO2 25   BUN 14   CREATININE 0.9   CALCIUM 9.4 Liver Function  Recent Labs     09/05/21 2112   LIPASE 50   BILITOT 0.3   AST 20   ALT 11   ALKPHOS 88   PROT 7.4   LABALBU 4.4     No results for input(s): LACTATE in the last 72 hours. No results for input(s): INR, PTT in the last 72 hours. Invalid input(s): PT    RADIOLOGY    CT ABDOMEN PELVIS WO CONTRAST Additional Contrast? None    Result Date: 9/5/2021  EXAMINATION: CT OF THE ABDOMEN AND PELVIS WITHOUT CONTRAST 9/5/2021 8:55 pm TECHNIQUE: CT of the abdomen and pelvis was performed without the administration of intravenous contrast. Multiplanar reformatted images are provided for review. Dose modulation, iterative reconstruction, and/or weight based adjustment of the mA/kV was utilized to reduce the radiation dose to as low as reasonably achievable. COMPARISON: January 27, 2020 HISTORY: ORDERING SYSTEM PROVIDED HISTORY: Vomiting altered mental status TECHNOLOGIST PROVIDED HISTORY: Reason for exam:->Vomiting altered mental status Additional Contrast?->None Decision Support Exception - unselect if not a suspected or confirmed emergency medical condition->Emergency Medical Condition (MA) What reading provider will be dictating this exam?->CRC FINDINGS: Lower Chest: The lung bases are free of infiltrates and effusions. Chronic interstitial changes. Partially visualized pleural base nodularity measuring approximately 4 mm. No routine follow-up indicated. Organs: The liver, spleen, adrenals, pancreas are within normal limits. Distended gallbladder with large gallstones. Dilatation of the common bile duct measuring approximately 1 cm. Consider further evaluation with biliary ultrasound. No evidence of urolithiasis or obstructive uropathy. GI/Bowel: No bowel obstruction or free intraperitoneal air. Sigmoid diverticulosis with no evidence of diverticulitis. Normal appendix, no signs of appendicitis. Pelvis: Urinary bladder within normal limits.  Peritoneum/Retroperitoneum: No abdominal aortic aneurysm or retroperitoneal adenopathy. Bones/Soft Tissues: No evidence of acute bony pathology. Distended gallbladder with large gallstones. Dilatation of the common bile duct measuring approximately 1 cm. Consider further evaluation with biliary ultrasound. Sigmoid diverticulosis with no evidence of diverticulitis. No inflammatory changes in the abdomen or pelvis. CT HEAD WO CONTRAST    Result Date: 9/5/2021  EXAMINATION: CT OF THE HEAD WITHOUT CONTRAST  9/5/2021 9:55 pm TECHNIQUE: CT of the head was performed without the administration of intravenous contrast. Dose modulation, iterative reconstruction, and/or weight based adjustment of the mA/kV was utilized to reduce the radiation dose to as low as reasonably achievable. COMPARISON: July 18, 2020 HISTORY: ORDERING SYSTEM PROVIDED HISTORY: Evaluate intracranial abnormality TECHNOLOGIST PROVIDED HISTORY: Has a \"code stroke\" or \"stroke alert\" been called? ->No Reason for exam:->Evaluate intracranial abnormality Decision Support Exception - unselect if not a suspected or confirmed emergency medical condition->Emergency Medical Condition (MA) What reading provider will be dictating this exam?->CRC FINDINGS: BRAIN/VENTRICLES: There are age related cortical atrophy and periventricular white matter ischemic changes. 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 There is no acute intracranial hemorrhage, mass effect or midline shift. No abnormal extra-axial fluid collection. The gray-white differentiation is maintained without evidence of an acute infarct. There is no evidence of hydrocephalus. ORBITS: The visualized portion of the orbits demonstrate no acute abnormality. SINUSES: The visualized paranasal sinuses and mastoid air cells demonstrate no acute abnormality. SOFT TISSUES/SKULL:  No acute abnormality of the visualized skull or soft tissues. No acute intracranial abnormality.  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 US GALLBLADDER RUQ    Result Date: 9/6/2021  EXAMINATION: RIGHT UPPER QUADRANT ULTRASOUND 9/6/2021 8:57 am COMPARISON: None. HISTORY: ORDERING SYSTEM PROVIDED HISTORY: cholelithiasis r/o cholecystitis TECHNOLOGIST PROVIDED HISTORY: Reason for exam:->cholelithiasis r/o cholecystitis What reading provider will be dictating this exam?->CRC FINDINGS: BILIARY SYSTEM:  There are echogenic foci within the gallbladder lumen, which are non mobile on the real-time imaging. These may be related to gallstones or polyps. The gallbladder wall is mildly thickened up to 3.2 mm. Common bile duct measures up to 7.3 mm in caliber, which is within normal limits for the patient age. OTHER: No evidence of right upper quadrant ascites. Non mobile gallstones within the gallbladder lumen versus polyps. Mild thickening of the gallbladder wall is nonspecific. Cannot exclude cholecystitis sonographically. The common bile duct is within normal limits in caliber for patient age. XR CHEST PORTABLE    Result Date: 9/5/2021  EXAMINATION: ONE XRAY VIEW OF THE CHEST 9/5/2021 9:38 pm COMPARISON: None. HISTORY: ORDERING SYSTEM PROVIDED HISTORY: vomiting ams TECHNOLOGIST PROVIDED HISTORY: Reason for exam:->vomiting ams What reading provider will be dictating this exam?->CRC FINDINGS: Coarse interstitial markings and background lung hyperinflation. No formed consolidations, pleural effusions, or pneumothoraces. There is atherosclerotic disease in the thoracic aorta. The remaining cardiomediastinal silhouette and the pulmonary vascularity appear within normal limits. Osseous and thoracic soft tissue structures demonstrate no acute findings. Lung hyperinflation and coarse interstitial markings. Atherosclerotic disease. No acute cardiopulmonary pathology. ASSESSMENT:  79 y.o. female with postprandial nausea and vomiting. Dilated CBD seen on CT and ultrasound.  No evidence of cholecystitis    PLAN:  - MRCP  - continue clear liquid diet, NPO at midnight  - pain/nausea control    Electronically signed by Sameera Tovar DO on 9/6/21 at 4:11 PM EDT

## 2021-09-06 NOTE — ED PROVIDER NOTES
HPI:  9/5/21, Time: 8:47 PM EDT         Noam Zamudio is a 79 y.o. female presenting to the ED for altered mental status and nausea vomiting, beginning hours ago. The complaint has been persistent, moderate in severity, and worsened by nothing. Patient reportedly having several episodes of nausea vomiting. Patient also more altered. Patient reporting no pain in her chest or abdomen. Patient having no fever there is no history of productive cough. There is no leg pain. There is no history of any urinary symptoms. Patient reportedly has questionable rash in her left back. Patient normally oriented x3. ROS:   Pertinent positives and negatives are stated within HPI, all other systems reviewed and are negative.  --------------------------------------------- PAST HISTORY ---------------------------------------------  Past Medical History:  has no past medical history on file. Past Surgical History:  has no past surgical history on file. Social History:  reports that she has never smoked. She has never used smokeless tobacco. She reports that she does not drink alcohol and does not use drugs. Family History: family history is not on file. The patients home medications have been reviewed. Allergies: Patient has no known allergies. ---------------------------------------------------PHYSICAL EXAM--------------------------------------    Constitutional/General: Alert and oriented to person and place but not time  Head: Normocephalic and atraumatic  Eyes: PERRL, EOMI  Mouth: Oropharynx clear, handling secretions, no trismus  Neck: Supple, full ROM, non tender to palpation in the midline, no stridor, no crepitus, no meningeal signs  Pulmonary: Lungs clear to auscultation bilaterally, no wheezes, rales, or rhonchi. Not in respiratory distress  Cardiovascular:  Regular rate. Regular rhythm. No murmurs, gallops, or rubs. 2+ distal pulses  Chest: no chest wall tenderness  Abdomen: Soft. Non tender. Non distended. +BS. No rebound, guarding, or rigidity. No pulsatile masses appreciated. Musculoskeletal: Moves all extremities x 4. Warm and well perfused, no clubbing, cyanosis, or edema. Capillary refill <3 seconds  Skin: warm and dry. Noted rash in the left upper back appears to be in a dermatomal pattern pattern there is no drainage of lesions. Neurologic: Oriented to person and place but not time. Patient able to move upper extremities limited range of motion of lower extremities able move feet though.    -------------------------------------------------- RESULTS -------------------------------------------------  I have personally reviewed all laboratory and imaging results for this patient. Results are listed below.      LABS:  Results for orders placed or performed during the hospital encounter of 09/05/21   CBC auto differential   Result Value Ref Range    WBC 12.8 (H) 4.5 - 11.5 E9/L    RBC 4.23 3.50 - 5.50 E12/L    Hemoglobin 13.0 11.5 - 15.5 g/dL    Hematocrit 40.5 34.0 - 48.0 %    MCV 95.7 80.0 - 99.9 fL    MCH 30.7 26.0 - 35.0 pg    MCHC 32.1 32.0 - 34.5 %    RDW 13.3 11.5 - 15.0 fL    Platelets 322 807 - 701 E9/L    MPV 10.1 7.0 - 12.0 fL    Neutrophils % 71.7 43.0 - 80.0 %    Immature Granulocytes % 0.6 0.0 - 5.0 %    Lymphocytes % 18.1 (L) 20.0 - 42.0 %    Monocytes % 8.8 2.0 - 12.0 %    Eosinophils % 0.4 0.0 - 6.0 %    Basophils % 0.4 0.0 - 2.0 %    Neutrophils Absolute 9.16 (H) 1.80 - 7.30 E9/L    Immature Granulocytes # 0.08 E9/L    Lymphocytes Absolute 2.31 1.50 - 4.00 E9/L    Monocytes Absolute 1.13 (H) 0.10 - 0.95 E9/L    Eosinophils Absolute 0.05 0.05 - 0.50 E9/L    Basophils Absolute 0.05 0.00 - 0.20 E9/L   Comprehensive Metabolic Panel   Result Value Ref Range    Sodium 139 132 - 146 mmol/L    Potassium 3.7 3.5 - 5.0 mmol/L    Chloride 102 98 - 107 mmol/L    CO2 18 (L) 22 - 29 mmol/L    Anion Gap 19 (H) 7 - 16 mmol/L    Glucose 245 (H) 74 - 99 mg/dL    BUN 18 6 - 23 mg/dL    CREATININE 0.9 0.5 - 1.0 mg/dL    GFR Non-African American 60 >=60 mL/min/1.73    GFR African American >60     Calcium 9.5 8.6 - 10.2 mg/dL    Total Protein 7.4 6.4 - 8.3 g/dL    Albumin 4.4 3.5 - 5.2 g/dL    Total Bilirubin 0.3 0.0 - 1.2 mg/dL    Alkaline Phosphatase 88 35 - 104 U/L    ALT 11 0 - 32 U/L    AST 20 0 - 31 U/L   Troponin   Result Value Ref Range    Troponin, High Sensitivity 10 (H) 0 - 9 ng/L   Lipase   Result Value Ref Range    Lipase 50 13 - 60 U/L   Urinalysis   Result Value Ref Range    Color, UA Yellow Straw/Yellow    Clarity, UA Clear Clear    Glucose, Ur 250 (A) Negative mg/dL    Bilirubin Urine Negative Negative    Ketones, Urine 40 (A) Negative mg/dL    Specific Gravity, UA 1.020 1.005 - 1.030    Blood, Urine TRACE (A) Negative    pH, UA 7.5 5.0 - 9.0    Protein,  (A) Negative mg/dL    Urobilinogen, Urine 0.2 <2.0 E.U./dL    Nitrite, Urine Negative Negative    Leukocyte Esterase, Urine Negative Negative   Lactic Acid, Plasma   Result Value Ref Range    Lactic Acid 2.6 (H) 0.5 - 2.2 mmol/L   Microscopic Urinalysis   Result Value Ref Range    WBC, UA 0-1 0 - 5 /HPF    RBC, UA 0-1 0 - 2 /HPF    Bacteria, UA NONE SEEN None Seen /HPF   EKG 12 Lead   Result Value Ref Range    Ventricular Rate 95 BPM    Atrial Rate 95 BPM    P-R Interval 184 ms    QRS Duration 108 ms    Q-T Interval 372 ms    QTc Calculation (Bazett) 467 ms    P Axis 35 degrees    R Axis -26 degrees    T Axis 37 degrees       RADIOLOGY:  Interpreted by Radiologist.  CT HEAD WO CONTRAST   Final Result   No acute intracranial abnormality. 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 CT ABDOMEN PELVIS WO CONTRAST Additional Contrast? None   Final Result   Distended gallbladder with large gallstones. Dilatation of the common bile   duct measuring approximately 1 cm. Consider further evaluation with biliary   ultrasound. Sigmoid diverticulosis with no evidence of diverticulitis.       No inflammatory changes in the abdomen or pelvis. XR CHEST PORTABLE   Final Result   Lung hyperinflation and coarse interstitial markings. Atherosclerotic   disease. No acute cardiopulmonary pathology. EKG:  This EKG is signed and interpreted by me. Rate: 95  Rhythm: Sinus  Interpretation: non-specific EKG  Comparison: stable as compared to patient's most recent EKG        ------------------------- NURSING NOTES AND VITALS REVIEWED ---------------------------   The nursing notes within the ED encounter and vital signs as below have been reviewed by myself. BP (!) 152/79   Pulse 74   Temp 97.8 °F (36.6 °C)   Resp 17   SpO2 95%   Oxygen Saturation Interpretation: Normal    The patients available past medical records and past encounters were reviewed. ------------------------------ ED COURSE/MEDICAL DECISION MAKING----------------------  Medications   0.9 % sodium chloride infusion ( IntraVENous New Bag 9/5/21 2112)   ondansetron (ZOFRAN) injection 4 mg (4 mg IntraVENous Given 9/6/21 1749)             Medical Decision Making:    Patient presenting here because of altered mental status. Patient normally oriented. Patient is confused oriented to person and place but not to time. Patient had reportedly had multiple episodes of emesis at facility. Patient labs noted reviewed white count was 12 she is afebrile urinalysis still pending CT head showed old cerebellar infarct she also has noted gallstones and distended gallbladder. Patient has had no emesis here in the emergency department. Plan will be to admit for further evaluation treatment. Re-Evaluations:             Re-evaluation. Patients symptoms show no change  Reevaluated and reports she urinated on herself. Patient vital signs noted. Consultations:               Internal medicine  Critical Care:          This patient's ED course included: a personal history and physicial eaxmination    This patient has been closely monitored during their ED course. Counseling: The emergency provider has spoken with the patient and discussed todays results, in addition to providing specific details for the plan of care and counseling regarding the diagnosis and prognosis. Questions are answered at this time and they are agreeable with the plan.       --------------------------------- IMPRESSION AND DISPOSITION ---------------------------------    IMPRESSION  1. Nausea and vomiting, intractability of vomiting not specified, unspecified vomiting type    2. Altered mental status, unspecified altered mental status type    3. Gallstones        DISPOSITION  Disposition: Admit to monitored bed  Patient condition is stable        NOTE: This report was transcribed using voice recognition software.  Every effort was made to ensure accuracy; however, inadvertent computerized transcription errors may be present          Lenin Berrios MD  09/06/21 0010            Lenin Berrios MD  09/06/21 5031

## 2021-09-06 NOTE — PROGRESS NOTES
Called general surgery consult to answering service, Dr. Martín Noe added to care team.    [facility] notified via perfect serve regarding consult.     Gaetano Mccullough RN

## 2021-09-06 NOTE — PROGRESS NOTES
Patient from [facility]. DNR-CCA paperwork signed fom physician in soft chart.     Marolyn Denver, RN

## 2021-09-06 NOTE — PROGRESS NOTES
Unable to fully complete admission navigator due to patient having altered mental status and being alert to self only. Medication list in soft chart from facility.     Helena Schroeder RN

## 2021-09-07 PROCEDURE — G0378 HOSPITAL OBSERVATION PER HR: HCPCS

## 2021-09-07 PROCEDURE — 6370000000 HC RX 637 (ALT 250 FOR IP): Performed by: NURSE PRACTITIONER

## 2021-09-07 PROCEDURE — 2580000003 HC RX 258: Performed by: NURSE PRACTITIONER

## 2021-09-07 RX ADMIN — DOCUSATE SODIUM 100 MG: 100 CAPSULE, LIQUID FILLED ORAL at 20:46

## 2021-09-07 RX ADMIN — SODIUM CHLORIDE, PRESERVATIVE FREE 10 ML: 5 INJECTION INTRAVENOUS at 20:45

## 2021-09-07 RX ADMIN — DONEPEZIL HYDROCHLORIDE 10 MG: 5 TABLET, FILM COATED ORAL at 20:46

## 2021-09-07 RX ADMIN — MIRTAZAPINE 15 MG: 15 TABLET, FILM COATED ORAL at 20:46

## 2021-09-07 RX ADMIN — DIVALPROEX SODIUM 250 MG: 250 TABLET, DELAYED RELEASE ORAL at 20:46

## 2021-09-07 ASSESSMENT — PAIN SCALES - GENERAL: PAINLEVEL_OUTOF10: 0

## 2021-09-07 NOTE — PROGRESS NOTES
GENERAL SURGERY  DAILY PROGRESS NOTE  9/7/2021    Chief Complaint   Patient presents with    Emesis     Vomiting after dinner. Took some mylanta with no relief of symptoms. EMS also states a rash that appears like shingles under right arm       Subjective:  Pt states that she is doing okay. Denies any abdominal pain. Objective:  BP (!) 141/58   Pulse 65   Temp 98.7 °F (37.1 °C) (Temporal)   Resp 16   SpO2 93%     GENERAL:  Laying in bed, awake, alert, cooperative, no apparent distress  HEAD: Normocephalic, atraumatic  EYES: No sclera icterus, pupils equal  LUNGS:  No increased work of breathing  CARDIOVASCULAR:  RR and normotensive  ABDOMEN:  Soft, non-tender, non-distended  EXTREMITIES: No edema or swelling  SKIN: Warm and dry    Assessment/Plan:  79 y.o. female with postprandial nausea/vomiting and dilated CBD    - NPO today  - Plan for MRCP today  - Pain control and antiemetic PRN  - Okay for diet after MRCP    Electronically signed by Abran John MD on 9/7/2021 at 6:07 AM     The patient was seen and examined and the chart was reviewed. The ultrasound revealed a nonmobile gallstone versus polyp. The patient's liver function studies have normalized. The patient will undergo an MRCP today. Any further evaluation and treatment will be dependent upon the findings.

## 2021-09-07 NOTE — CARE COORDINATION
9/7/21 Transition of Care: patient is observation due to change in mental status at [facility]isted [facility]. She resides at the [facility]. She is currently with nausea/vomiting. She is NPO due to having a MRCP today. She has iv flds 100hr. PT.OT evals are pending. Her pcp is Dr Girma Remy and her pharmacy is .[facility]. Contact has been made with liaison at [facility]. Will follow for plan to return pending therapy evaluations.  Chalo Villavicencio RN Cm

## 2021-09-07 NOTE — PLAN OF CARE
Problem: Pain:  Goal: Pain level will decrease  Description: Pain level will decrease  Outcome: Ongoing  Goal: Control of acute pain  Description: Control of acute pain  Outcome: Ongoing  Goal: Control of chronic pain  Description: Control of chronic pain  Outcome: Ongoing     Problem: Falls - Risk of:  Goal: Will remain free from falls  Description: Will remain free from falls  Outcome: Ongoing  Goal: Absence of physical injury  Description: Absence of physical injury  Outcome: Ongoing     Problem: Mental Status - Impaired:  Goal: Mental status will improve  Description: Mental status will improve  Outcome: Ongoing

## 2021-09-08 ENCOUNTER — APPOINTMENT (OUTPATIENT)
Dept: MRI IMAGING | Age: 81
End: 2021-09-08
Payer: MEDICARE

## 2021-09-08 VITALS
DIASTOLIC BLOOD PRESSURE: 59 MMHG | RESPIRATION RATE: 16 BRPM | TEMPERATURE: 98.7 F | HEART RATE: 67 BPM | OXYGEN SATURATION: 94 % | SYSTOLIC BLOOD PRESSURE: 147 MMHG

## 2021-09-08 PROBLEM — K80.50 BILIARY COLIC: Status: ACTIVE | Noted: 2021-09-08

## 2021-09-08 LAB
ALBUMIN SERPL-MCNC: 3.4 G/DL (ref 3.5–5.2)
ALP BLD-CCNC: 162 U/L (ref 35–104)
ALT SERPL-CCNC: 38 U/L (ref 0–32)
ANION GAP SERPL CALCULATED.3IONS-SCNC: 16 MMOL/L (ref 7–16)
AST SERPL-CCNC: 39 U/L (ref 0–31)
BASOPHILS ABSOLUTE: 0.04 E9/L (ref 0–0.2)
BASOPHILS RELATIVE PERCENT: 0.5 % (ref 0–2)
BILIRUB SERPL-MCNC: 0.6 MG/DL (ref 0–1.2)
BUN BLDV-MCNC: 12 MG/DL (ref 6–23)
CALCIUM SERPL-MCNC: 8.6 MG/DL (ref 8.6–10.2)
CHLORIDE BLD-SCNC: 106 MMOL/L (ref 98–107)
CO2: 20 MMOL/L (ref 22–29)
CREAT SERPL-MCNC: 0.9 MG/DL (ref 0.5–1)
EOSINOPHILS ABSOLUTE: 0.06 E9/L (ref 0.05–0.5)
EOSINOPHILS RELATIVE PERCENT: 0.7 % (ref 0–6)
GFR AFRICAN AMERICAN: >60
GFR NON-AFRICAN AMERICAN: 60 ML/MIN/1.73
GLUCOSE BLD-MCNC: 69 MG/DL (ref 74–99)
HCT VFR BLD CALC: 37.1 % (ref 34–48)
HEMOGLOBIN: 12.1 G/DL (ref 11.5–15.5)
IMMATURE GRANULOCYTES #: 0.07 E9/L
IMMATURE GRANULOCYTES %: 0.8 % (ref 0–5)
LYMPHOCYTES ABSOLUTE: 1.6 E9/L (ref 1.5–4)
LYMPHOCYTES RELATIVE PERCENT: 19.3 % (ref 20–42)
MCH RBC QN AUTO: 30.4 PG (ref 26–35)
MCHC RBC AUTO-ENTMCNC: 32.6 % (ref 32–34.5)
MCV RBC AUTO: 93.2 FL (ref 80–99.9)
MONOCYTES ABSOLUTE: 1.07 E9/L (ref 0.1–0.95)
MONOCYTES RELATIVE PERCENT: 12.9 % (ref 2–12)
NEUTROPHILS ABSOLUTE: 5.46 E9/L (ref 1.8–7.3)
NEUTROPHILS RELATIVE PERCENT: 65.8 % (ref 43–80)
PDW BLD-RTO: 13.2 FL (ref 11.5–15)
PLATELET # BLD: 276 E9/L (ref 130–450)
PMV BLD AUTO: 9.7 FL (ref 7–12)
POTASSIUM SERPL-SCNC: 3.4 MMOL/L (ref 3.5–5)
RBC # BLD: 3.98 E12/L (ref 3.5–5.5)
SODIUM BLD-SCNC: 142 MMOL/L (ref 132–146)
TOTAL PROTEIN: 6.3 G/DL (ref 6.4–8.3)
WBC # BLD: 8.3 E9/L (ref 4.5–11.5)

## 2021-09-08 PROCEDURE — 97530 THERAPEUTIC ACTIVITIES: CPT

## 2021-09-08 PROCEDURE — 6370000000 HC RX 637 (ALT 250 FOR IP): Performed by: NURSE PRACTITIONER

## 2021-09-08 PROCEDURE — 97161 PT EVAL LOW COMPLEX 20 MIN: CPT

## 2021-09-08 PROCEDURE — 6360000004 HC RX CONTRAST MEDICATION: Performed by: RADIOLOGY

## 2021-09-08 PROCEDURE — G0378 HOSPITAL OBSERVATION PER HR: HCPCS

## 2021-09-08 PROCEDURE — 97165 OT EVAL LOW COMPLEX 30 MIN: CPT

## 2021-09-08 PROCEDURE — 80053 COMPREHEN METABOLIC PANEL: CPT

## 2021-09-08 PROCEDURE — 85025 COMPLETE CBC W/AUTO DIFF WBC: CPT

## 2021-09-08 PROCEDURE — 74183 MRI ABD W/O CNTR FLWD CNTR: CPT

## 2021-09-08 PROCEDURE — 92610 EVALUATE SWALLOWING FUNCTION: CPT

## 2021-09-08 PROCEDURE — 2580000003 HC RX 258: Performed by: NURSE PRACTITIONER

## 2021-09-08 PROCEDURE — 97535 SELF CARE MNGMENT TRAINING: CPT

## 2021-09-08 PROCEDURE — A9579 GAD-BASE MR CONTRAST NOS,1ML: HCPCS | Performed by: RADIOLOGY

## 2021-09-08 PROCEDURE — 36415 COLL VENOUS BLD VENIPUNCTURE: CPT

## 2021-09-08 RX ADMIN — SODIUM CHLORIDE, PRESERVATIVE FREE 10 ML: 5 INJECTION INTRAVENOUS at 21:21

## 2021-09-08 RX ADMIN — GADOTERIDOL 12 ML: 279.3 INJECTION, SOLUTION INTRAVENOUS at 08:13

## 2021-09-08 RX ADMIN — DIVALPROEX SODIUM 250 MG: 250 TABLET, DELAYED RELEASE ORAL at 21:21

## 2021-09-08 RX ADMIN — DIVALPROEX SODIUM 250 MG: 250 TABLET, DELAYED RELEASE ORAL at 14:34

## 2021-09-08 RX ADMIN — DOCUSATE SODIUM 100 MG: 100 CAPSULE, LIQUID FILLED ORAL at 21:21

## 2021-09-08 RX ADMIN — MIRTAZAPINE 15 MG: 15 TABLET, FILM COATED ORAL at 21:21

## 2021-09-08 RX ADMIN — DONEPEZIL HYDROCHLORIDE 10 MG: 5 TABLET, FILM COATED ORAL at 21:21

## 2021-09-08 ASSESSMENT — PAIN SCALES - GENERAL: PAINLEVEL_OUTOF10: 0

## 2021-09-08 NOTE — CARE COORDINATION
9/8/21 Update CM Note; Met with patients Spencer HARTMAN, regarding need for [facility] based on therapy scores. He states [facility] would be the sister facility to [facility] and he is fine with any of the [facility] facilities. PT score 11/24. OT pending. Patient remains pleasantly confused. She is cooperative. Spoke with Kennedi and she will pull the therapy notes and evaluate. Patient will go to the [facility] for therapy. Patient is post Mrcp of today. MRI unremarkable. Will follow for readiness to discharge and wait for [facility] to accept.  Alma Umana RN Cm

## 2021-09-08 NOTE — PROGRESS NOTES
SPEECH/LANGUAGE PATHOLOGY  CLINICAL ASSESSMENT OF SWALLOWING FUNCTION   and PLAN OF CARE  PATIENT NAME:  Ying Gallegos  (female)     MRN:  [id number]    :  1940  (79 y.o.)  STATUS:  Inpatient: Room 8208/8208-B    TODAY'S DATE:  2021  REFERRING PROVIDER: DOMINIQUE Brown CNP    SPECIFIC PROVIDER ORDER: SLP swallowing-dysphagia evaluation and treatment Date of order:  21  REASON FOR REFERRAL: dysphagia   EVALUATING THERAPIST: MIGUEL Gallegos                 ASSESSMENT:    DYSPHAGIA DIAGNOSIS:   Clinical indicators of normal swallow function      DIET RECOMMENDATIONS:  Regular consistency solids with  thin liquids     FEEDING RECOMMENDATIONS:     Assistance level:  No assistance needed      Compensatory strategies recommended: No strategies are recommended at this time      Discussed recommendations with nursing?: No secondary to no diet/liquid change recommended     SPEECH THERAPY  PLAN OF CARE   The dysphagia POC is established based on physician order, dysphagia diagnosis and results of clinical assessment     Dysphagia therapy is not recommended     Conditions Requiring Skilled Therapeutic Intervention for dysphagia:    not applicable    Specific dysphagia interventions to include:     Not applicable    Specific instructions for next treatment:  not applicable   Patient Treatment Goals:    Short Term Goals:  Not applicable no therapy warranted     Long Term Goals:   Not applicable no therapy warranted      Patient/family Goal:    not applicable                    ADMITTING DIAGNOSIS: Altered mental status [R41.82]  Gallstones [K80.20]  Altered mental status, unspecified altered mental status type [R41.82]  Nausea and vomiting, intractability of vomiting not specified, unspecified vomiting type [R11.2]    VISIT DIAGNOSIS:   Visit Diagnoses       Codes    Altered mental status, unspecified altered mental status type     R41.82    Gallstones     K80.20           PATIENT REPORT/COMPLAINT: none reported    RN cleared patient for participation in assessment     yes     PRIOR LEVEL OF SWALLOW FUNCTION:    PAST HISTORY OF DYSPHAGIA?: none reported    Home diet: Regular consistency solids with  thin liquids  PROCEDURE:  Consistencies Administered During the Evaluation   Liquids: thin liquid   Solids:  pureed foods and soft solid foods      Method of Intake:   cup, straw, spoon  Self fed      Position:   Seated, upright    CLINICAL ASSESSMENT  Oral Stage: The oral stage of swallowing was within functional limits      Pharyngeal Stage:    No signs of aspiration were noted during this evaluation however, silent aspiration cannot be ruled out at bedside. If silent aspiration is suspected, a Videofluoroscopic Study of Swallowing (MBS) is recommended and requires a physician order. Cognition:   Confusion noted    Oral Peripheral Examination   Adequate lingual/labial strength     Current Respiratory Status    room air     Parameters of Speech Production  Respiration:  Adequate for speech production  Quality:   Within functional limits  Intensity: Within functional limits    Volitional Swallow: Present    Volitional Cough:    Present    Pain: No pain reported. EDUCATION:   The Speech Language Pathologist (SLP) completed education regarding results of evaluation and that intervention is not warranted at this time. Learner: Patient  Education:  Reviewed results and recommendations of this evaluation  Evaluation of Education: Verbalizes understanding    This plan will be re-evaluated and revised in 1 week  if warranted. CPT code:  26978  bedside swallow eval      [x]The admitting diagnosis and active problem list, have been reviewed prior to initiation of this evaluation.         ACTIVE PROBLEM LIST:   Patient Active Problem List   Diagnosis    Essential hypertension    Acute colitis    Nausea and vomiting    Depression    Dementia ([facility] [address]    Cholelithiases    HTN (hypertension)    Leukocytosis    Shingles

## 2021-09-08 NOTE — CARE COORDINATION
9/8/21 Update CM Note: patient is accepted at [facility] at the [facility]. Will need completion of the OT notes to complete process. [facility]/electronic ambulance form done and sent/sam completed. Envelope placed in soft chart. Will need covid test on discharge date.  Mayra Catalan RN CM

## 2021-09-08 NOTE — PROGRESS NOTES
Chief Complaint:  Chief Complaint   Patient presents with    Emesis     Vomiting after dinner. Took some mylanta with no relief of symptoms. EMS also states a rash that appears like shingles under right arm     Nausea and vomiting     Subjective:    RUQ pain resolved, no complaints today    Objective:    BP (!) 170/72   Pulse 76   Temp 97.8 °F (36.6 °C) (Temporal)   Resp 18   SpO2 94%     Current medications that patient is taking have been reviewed.     General appearance: NAD, conversant  HEENT: AT/NC, MMM  Neck: FROM, supple  Lungs: Clear to auscultation, WOB normal  CV: RRR, no MRGs  Abdomen: Soft, non-tender; no masses or HSM, +BS  Extremities: No peripheral edema or digital cyanosis  Skin: Shingles - stable, crusted, nontender  Psych: Calm and cooperative, flat affect  Neuro: Alert and interactive, face symmetric, moving all extremities, speech fluent, somewhat demented    Labs:  CBC with Differential:    Lab Results   Component Value Date    WBC 10.3 09/06/2021    RBC 4.20 09/06/2021    HGB 12.8 09/06/2021    HCT 39.5 09/06/2021     09/06/2021    MCV 94.0 09/06/2021    MCH 30.5 09/06/2021    MCHC 32.4 09/06/2021    RDW 13.2 09/06/2021    SEGSPCT 54 03/14/2011    LYMPHOPCT 18.1 09/05/2021    MONOPCT 8.8 09/05/2021    BASOPCT 0.4 09/05/2021    MONOSABS 1.13 09/05/2021    LYMPHSABS 2.31 09/05/2021    EOSABS 0.05 09/05/2021    BASOSABS 0.05 09/05/2021     CMP:    Lab Results   Component Value Date     09/06/2021    K 4.3 09/06/2021     09/06/2021    CO2 25 09/06/2021    BUN 14 09/06/2021    CREATININE 0.9 09/06/2021    GFRAA >60 09/06/2021    LABGLOM 60 09/06/2021    GLUCOSE 105 09/06/2021    PROT 7.4 09/05/2021    LABALBU 4.4 09/05/2021    CALCIUM 9.4 09/06/2021    BILITOT 0.3 09/05/2021    ALKPHOS 88 09/05/2021    AST 20 09/05/2021    ALT 11 09/05/2021          Assessment/Plan:  Principal Problem:    Nausea and vomiting  Active Problems:    Dementia (HCC)    Cholelithiases    HTN (hypertension)    Leukocytosis    Shingles  Resolved Problems:    * No resolved hospital problems.  *       MRCP pending, seems to have been delayed today    Monitor CMP    BP a bit high at times, overall OK for inpatient purposes    Requires continued inpatient level of care     David Rader MD    8:50 PM  9/7/2021

## 2021-09-08 NOTE — PROGRESS NOTES
Occupational Therapy  OCCUPATIONAL THERAPY INITIAL EVALUATION    [address] [address] [address]      Date:2021                                                Patient Name: Samantha Ribeiro  MRN: [id number]  : 1940  Room: [address] [place] [address]    Evaluating OT: Katt Diane OTR/L #[id number]     Referring Provider: DOMINIQUE Brown CNP  Specific Provider Orders/Date: OT eval and treat 21    Diagnosis: Altered mental status [R41.82]  Gallstones [K80.20]  Altered mental status, unspecified altered mental status type [R41.82]  Nausea and vomiting, intractability of vomiting not specified, unspecified vomiting type [R11.2]   Pt admitted to hospital with AMS and nausea / vomiting     Pertinent Medical History:  has no past medical history on file.        Precautions:  Fall Risk, bed alarm    Assessment of current deficits    [x] Functional mobility  [x]ADLs  [x] Strength               []Cognition    [x] Functional transfers   [x] IADLs         [x] Safety Awareness   [x]Endurance    [] Fine Coordination              [x] Balance      [] Vision/perception   []Sensation     []Gross Motor Coordination  [] ROM  [] Delirium                   [] Motor Control     OT PLAN OF CARE   OT POC based on physician orders, patient diagnosis and results of clinical assessment    Frequency/Duration 1-3 days/wk for 2 weeks PRN   Specific OT Treatment Interventions to include:   * Instruction/training on adapted ADL techniques and AE recommendations to increase functional independence within precautions       * Training on energy conservation strategies, correct breathing pattern and techniques to improve independence/tolerance for self-care routine  * Functional transfer/mobility training/DME recommendations for increased independence, safety, and fall prevention  * Patient/Family education to increase follow through with safety techniques and functional A  Standing: mod A     Activity Tolerance F-    Light activity  F+   Visual/  Perceptual Glasses: reading  wfl                  Hand Dominance right   Strength ROM Additional Info:    RUE   3+/5 wfl good  and wfl FMC/dexterity noted during ADL tasks     LUE 3+/5 wfl good  and wfl FMC/dexterity noted during ADL tasks     Hearing: wfl  Sensation:wfl  Tone: wfl  Edema:none noted     Comments: Upon arrival patient supine in bed and agreeable to OT Session. Therapist educated pt on role of OT. At end of session, patient semi-supine in bed with call light and phone within reach, all lines and tubes intact. Overall patient demonstrated decreased independence and safety during completion of ADL/functional transfer/mobility tasks. Pt would benefit from continued skilled OT to increase safety and independence with completion of ADL/IADL tasks for functional independence and quality of life. Treatment: OT treatment provided this date includes: Facilitation of bed mobility,  sitting balance at EOB (impacting ADLs; addressing posture, weight shifting, dynamic reaching), functional sit to stand transfers , standing tolerance tasks at w/w (addressing posture, balance and activity tolerance; impacting ADLs and functional activity) and several short side steps with w/w (in preparation for ambulation to/ from bathroom and in preparation for item retrieval tasks; cuing on posture, w/w management, sequencing and safety) - skilled cuing on sequencing, hand placement, posture, body mechanics, energy conservation techniques and safety. Therapist facilitated self-care retraining: UB/LB self-care tasks (gown, socks), toileting task (bed pan x1; dependent for hygiene; + episode of incontinence x1) and seated grooming tasks while educating pt on sequencing, modified techniques, posture, safety and energy conservation techniques. Skilled monitoring of HR, O2 sats and pts response to treatment.        Rehab Potential: Good for established goals     Patient / Family Goal: return home      Patient and/or family were instructed on functional diagnosis, prognosis/goals and OT plan of care. Demonstrated P+ understanding. Eval Complexity: Low    Time In: 1300  Time Out: 1325  Total Treatment Time: 10 minutues    Min Units   OT Eval Low 97165  x  1   OT Eval Medium 66820      OT Eval High 95569      OT Re-Eval [id number]       Therapeutic Ex 55008       Therapeutic Activities 48889  2     ADL/Self Care 71849  8  1   Orthotic Management 06334       Manual 79865     Neuro Re-Ed 36333       Non-Billable Time          Evaluation Time additionally includes thorough review of current medical information, gathering information on past medical history/social history and prior level of function, interpretation of standardized testing/informal observation of tasks, assessment of data and development of plan of care and goals.           David Holman OTR/L #[id number]

## 2021-09-08 NOTE — DISCHARGE SUMMARY
Physician Discharge Summary     Patient ID:  Figueroa Barone  [id number]  89 y.o.  1940    Admit date: 9/5/2021    Discharge date and time:  9/8/2021     Admission Diagnoses:   Chief Complaint   Patient presents with    Emesis     Vomiting after dinner. Took some mylanta with no relief of symptoms. EMS also states a rash that appears like shingles under right arm      Biliary colic     Discharge Diagnoses:   Principal Problem:    Biliary colic  Active Problems:    Nausea and vomiting    Dementia (HCC)    Cholelithiases    HTN (hypertension)    Leukocytosis    Shingles  Resolved Problems:    * No resolved hospital problems. *       Consults: general surgery    Procedures: none    Hospital Course:   Patient presented with recurrent right upper quadrant pain after eating fatty/fried foods. She was found to have multiple fairly large gallstones. She did not have signs of active cholecystitis or cholangitis. .  Surgery saw her and they ordered MRCP which confirms presence of gallstones but nothing else concerning.   They are planning for outpatient cholecystectomy     Discharge Exam:  Vitals:    09/06/21 2024 09/07/21 0815 09/08/21 0020 09/08/21 0830   BP: (!) 141/58 (!) 170/72 (!) 152/64 (!) 147/59   Pulse: 65 76 64 67   Resp: 16 18 16 16   Temp: 98.7 °F (37.1 °C) 97.8 °F (36.6 °C) 99 °F (37.2 °C) 98.7 °F (37.1 °C)   TempSrc: Temporal Temporal Temporal Temporal   SpO2: 93% 94% 94%         General appearance: NAD, conversant  HEENT: AT/NC, MMM  Neck: FROM, supple  Lungs: Clear to auscultation, WOB normal  CV: RRR, no MRGs  Abdomen: Soft, non-tender; no masses or HSM, +BS  Extremities: No peripheral edema or digital cyanosis  Skin: no rash, lesions or ulcers  Psych: Calm and cooperative  Neuro: Alert and interactive, nonfocal, pleasantly demented    Condition:  Stable    Disposition: SNF    Patient Instructions:   Current Discharge Medication List      CONTINUE these medications which have NOT CHANGED    Details aspirin 81 MG EC tablet Take 81 mg by mouth daily      docusate sodium (COLACE) 100 MG capsule Take 100 mg by mouth 2 times daily      divalproex (DEPAKOTE) 250 MG DR tablet Take 250 mg by mouth 3 times daily      mirtazapine (REMERON) 15 MG tablet Take 15 mg by mouth nightly      Vitamin D, Cholecalciferol, 50 MCG (2000 UT) CAPS Take 2,000 Units by mouth daily       donepezil (ARICEPT) 10 MG tablet Take 10 mg by mouth nightly       bisacodyl (DULCOLAX) 5 MG EC tablet Take 5 mg by mouth daily as needed for Constipation      loratadine (CLARITIN) 10 MG tablet Take 10 mg by mouth daily      ibuprofen (ADVIL;MOTRIN) 600 MG tablet Take 600 mg by mouth every 6 hours as needed for Pain      loperamide (IMODIUM) 2 MG capsule Take 2 mg by mouth 4 times daily as needed for Diarrhea      magnesium hydroxide (MILK OF MAGNESIA) 400 MG/5ML suspension Take by mouth daily as needed for Constipation      ondansetron (ZOFRAN) 4 MG tablet Take 4 mg by mouth every 8 hours as needed for Nausea or Vomiting      acetaminophen (TYLENOL) 325 MG tablet Take 650 mg by mouth every 6 hours as needed for Pain       diphenhydrAMINE (BENADRYL) 25 MG capsule Take 25 mg by mouth nightly as needed for Sleep         STOP taking these medications       citalopram (CELEXA) 20 MG tablet Comments:   Reason for Stopping:             Activity: activity as tolerated  Diet: low fat, low cholesterol diet    Follow-up with PCP in 1 week.     Note that over 30 minutes was spent in preparing discharge papers, discussing discharge with patient, medication review, etc.    Signed:  Kathie Tomlinson MD    9/8/2021  4:18 PM

## 2021-09-08 NOTE — PROGRESS NOTES
Physical Therapy Initial Assessment     Name: Marva Alberts  : 1940  MRN: [id number]      Date of Service: 2021    Evaluating PT:  Valarie Valerio, PT, DPT [id number]      Room #:  0289/7661-N  Diagnosis:  Altered mental status [R41.82]  Gallstones [K80.20]  Altered mental status, unspecified altered mental status type [R41.82]  Nausea and vomiting, intractability of vomiting not specified, unspecified vomiting type [R11.2]  PMHx/PSHx:  None documented  Procedure/Surgery:  2021 MRCP scheduled  Precautions:  Falls, Confusion, Possible shingles on R upper back? Equipment Needs:  Has [employer]    SUBJECTIVE:    Pt lives with her sister in a 2 story home with 3 stairs to enter and ? rail. Bed is on 2nd floor and bath is on 2nd floor. Pt ambulated with a [employer] PTA. Pt questionable historian. OBJECTIVE:   Initial Evaluation  Date: 2021 Treatment Date:  NA Short Term/ Long Term   Goals   AM-PAC 6 Clicks      Was pt agreeable to Eval/treatment? Yes      Does pt have pain? Reported back pain from fall, did not quantify. Bed Mobility  Rolling: Min A  Supine to sit: Mod A  Sit to supine: Mod A  Scooting: Max A  Rolling: SBA  Supine to sit: Min A  Sit to supine: Min A  Scooting: Min A   Transfers Sit to stand: Mod A  Stand to sit: Min A  Stand pivot: NT  Sit to stand: Min A  Stand to sit: Min A  Stand pivot: Min A   Ambulation    3 feet laterally with [employer] with Mod A  >50 feet with [employer] with Min A   Stair negotiation: ascended and descended  NT  4 steps with 1 rail with Min A   ROM BUE:  WFL  BLE:  WFL     Strength BUE:  4/5  BLE:  4/5  Increase strength 1/3 grade. Balance Sitting EOB:  SBA  Dynamic Standing: Mod A with [employer]  Sitting EOB:  Supervision  Dynamic Standing:  Min A with [employer]     Pt is A & O x 1, self only. Pt provided home set up but unclear if accurate.   Sensation:  Pt denies numbness and tingling to extremities  Edema:  None noted    Vitals:  Blood Pressure at rest - Blood Pressure post session - Heart Rate at rest - Heart Rate post session -   SPO2 at rest - SPO2 post session -     Therapeutic Exercises:  NT    Patient education  Pt educated on purpose of PT assessment, importance of mobility, safety with mobility, transfers, gait, use of AD for safety    Patient response to education:   Pt verbalized understanding Pt demonstrated skill Pt requires further education in this area   Yes  Yes with verbal cues and assist Yes/Reinforcement     ASSESSMENT:    Conditions Requiring Skilled Therapeutic Intervention:     [x]Decreased strength     []Decreased ROM  [x]Decreased functional mobility  [x]Decreased balance   [x]Decreased endurance   []Decreased posture  []Decreased sensation  [x]Decreased coordination   []Decreased vision  [x]Decreased safety awareness   []Increased pain       Comments:  Patient cleared by RN and agreeable to treatment. Patient found in semi Myers's and confusion noted with answering questions. Patient required assist with BLEs off the bed and with trunk righting to achieve seated EOB. Patient denied dizziness with positional change. Patient found to be incontinent of urine and hygiene performed. Patient assisted to standing and moderately unsteady on her feet. Further hygiene in standing performed and linen and hospital gown changed. Patient not able to ambulate forward and instructed to side step to the [facility]. Patient moderately unsteady on her feet and performed very small, shuffled side steps to the [facility] and returned to seated. Patient assisted back to supine and positioned to the [facility] with call light and tray table in reach. Treatment:  Patient practiced and was instructed in the following treatment:    · Bed mobility: Verbal/tactile cues for sequencing BUEs/BLEs for safe technique with rolling/supine<>sit task. · Transfer training: Verbal/tactile cues to facilitate proper hand placement, technique and safety during sit to stand task.    · Gait training: Verbal and tactile cues to facilitate upright posture and safety as well as provided with physical assistance to complete task. · Therapeutic exercises: As noted above  · Neuromuscular education: NT    Pt's/ family goals   1. To feel better. Prognosis is good for reaching above PT goals. Patient and or family understand(s) diagnosis, prognosis, and plan of care. Questionable     PHYSICAL THERAPY PLAN OF CARE:    PT POC is established based on physician order and patient diagnosis     Referring provider/PT Order:    09/06/21 0815  PT eval and treat Start: 09/06/21 0815, End: 09/06/21 0815, ONE TIME, Standing Count: 1 Occurrences, R      Krysten Lerma, APRN - CNP     Diagnosis:  Altered mental status [R41.82]  Gallstones [K80.20]  Altered mental status, unspecified altered mental status type [R41.82]  Nausea and vomiting, intractability of vomiting not specified, unspecified vomiting type [R11.2]  Specific instructions for next treatment:  Subsequent PT sessions with focus on improved mobility, ambulation distance. Current Treatment Recommendations:     [x] Strengthening to improve independence with functional mobility   [] ROM to improve independence with functional mobility   [x] Balance Training to improve static/dynamic balance and to reduce fall risk  [x] Endurance Training to improve activity tolerance during functional mobility   [x] Transfer Training to improve safety and independence with all functional transfers   [x] Gait Training to improve gait mechanics, endurance and asses need for appropriate assistive device  [x] Stair Training in preparation for safe discharge home and/or into the community   [x] Positioning to prevent skin breakdown and contractures  [x] Safety and Education Training   [x] Patient/Caregiver Education   [] HEP  [] Other     PT long term treatment goals are located in above grid    Frequency of treatments: 2-5x/week x 1-2 weeks.     Time in  0926  Time out  0956    Total Treatment Time  20 minutes     Evaluation Time includes thorough review of current medical information, gathering information on past medical history/social history and prior level of function, completion of standardized testing/informal observation of tasks, assessment of data and education on plan of care and goals.     CPT codes:  [x] Low Complexity PT evaluation 96843  [] Moderate Complexity PT evaluation 25167  [] High Complexity PT evaluation 14635  [] PT Re-evaluation 35914  [] Gait training 38749 - minutes  [] Manual therapy 06475 - minutes  [x] Therapeutic activities 42863 20 minutes  [] Therapeutic exercises 06843 - minutes  [] Neuromuscular reeducation 72871 - minutes     Amanda Mood, PT, DPT  License [id number]

## 2021-09-08 NOTE — CARE COORDINATION
9/8/21 Update CM Note; [facility]. [facility] to  patient at 10:00pm but will try and [facility] for earlier time per eDan. Bedside nurse notified.  Deedee Potter RN CM

## 2021-09-08 NOTE — CARE COORDINATION
9/8/21 Update Cm Note; Rehab called to get OT notes in chart for possible discharge today. Patient being reviewed for Acceptance at [facility] the [facility]. Will follow for readiness to discharge per pcp as surgery has discharged patient.  Barbra Wei RN CM

## 2021-09-09 PROCEDURE — G0378 HOSPITAL OBSERVATION PER HR: HCPCS

## 2021-09-10 ENCOUNTER — APPOINTMENT (OUTPATIENT)
Dept: GENERAL RADIOLOGY | Age: 81
DRG: 641 | End: 2021-09-10
Payer: MEDICARE

## 2021-09-10 ENCOUNTER — APPOINTMENT (OUTPATIENT)
Dept: ULTRASOUND IMAGING | Age: 81
DRG: 641 | End: 2021-09-10
Payer: MEDICARE

## 2021-09-10 ENCOUNTER — HOSPITAL ENCOUNTER (INPATIENT)
Age: 81
LOS: 3 days | Discharge: NURSING, REHAB OR LONG-TERM CARE | DRG: 641 | End: 2021-09-13
Attending: EMERGENCY MEDICINE | Admitting: INTERNAL MEDICINE
Payer: MEDICARE

## 2021-09-10 ENCOUNTER — APPOINTMENT (OUTPATIENT)
Dept: CT IMAGING | Age: 81
DRG: 641 | End: 2021-09-10
Payer: MEDICARE

## 2021-09-10 DIAGNOSIS — A49.9 UTI (URINARY TRACT INFECTION), BACTERIAL: ICD-10-CM

## 2021-09-10 DIAGNOSIS — A41.9 SEPSIS, DUE TO UNSPECIFIED ORGANISM, UNSPECIFIED WHETHER ACUTE ORGAN DYSFUNCTION PRESENT (HCC): Primary | ICD-10-CM

## 2021-09-10 DIAGNOSIS — N39.0 UTI (URINARY TRACT INFECTION), BACTERIAL: ICD-10-CM

## 2021-09-10 DIAGNOSIS — R41.82 ALTERED MENTAL STATUS, UNSPECIFIED ALTERED MENTAL STATUS TYPE: ICD-10-CM

## 2021-09-10 LAB
ALBUMIN SERPL-MCNC: 3.6 G/DL (ref 3.5–5.2)
ALP BLD-CCNC: 150 U/L (ref 35–104)
ALT SERPL-CCNC: 26 U/L (ref 0–32)
ANION GAP SERPL CALCULATED.3IONS-SCNC: 9 MMOL/L (ref 7–16)
AST SERPL-CCNC: 24 U/L (ref 0–31)
ATYPICAL LYMPHOCYTE RELATIVE PERCENT: 4.3 % (ref 0–4)
BACTERIA: ABNORMAL /HPF
BASOPHILS ABSOLUTE: 0.22 E9/L (ref 0–0.2)
BASOPHILS RELATIVE PERCENT: 1.7 % (ref 0–2)
BILIRUB SERPL-MCNC: 0.5 MG/DL (ref 0–1.2)
BILIRUBIN DIRECT: 0.2 MG/DL (ref 0–0.3)
BILIRUBIN URINE: NEGATIVE
BILIRUBIN, INDIRECT: 0.3 MG/DL (ref 0–1)
BLOOD, URINE: ABNORMAL
BUN BLDV-MCNC: 14 MG/DL (ref 6–23)
BURR CELLS: ABNORMAL
CALCIUM SERPL-MCNC: 9.5 MG/DL (ref 8.6–10.2)
CHLORIDE BLD-SCNC: 105 MMOL/L (ref 98–107)
CLARITY: CLEAR
CO2: 28 MMOL/L (ref 22–29)
COLOR: YELLOW
CREAT SERPL-MCNC: 0.9 MG/DL (ref 0.5–1)
EOSINOPHILS ABSOLUTE: 0 E9/L (ref 0.05–0.5)
EOSINOPHILS RELATIVE PERCENT: 0 % (ref 0–6)
GFR AFRICAN AMERICAN: >60
GFR NON-AFRICAN AMERICAN: 60 ML/MIN/1.73
GLUCOSE BLD-MCNC: 181 MG/DL (ref 74–99)
GLUCOSE URINE: NEGATIVE MG/DL
HCT VFR BLD CALC: 37.2 % (ref 34–48)
HEMOGLOBIN: 12.6 G/DL (ref 11.5–15.5)
KETONES, URINE: ABNORMAL MG/DL
LACTIC ACID, SEPSIS: 1.8 MMOL/L (ref 0.5–1.9)
LEUKOCYTE ESTERASE, URINE: ABNORMAL
LIPASE: 33 U/L (ref 13–60)
LYMPHOCYTES ABSOLUTE: 1.18 E9/L (ref 1.5–4)
LYMPHOCYTES RELATIVE PERCENT: 4.3 % (ref 20–42)
MAGNESIUM: 2.1 MG/DL (ref 1.6–2.6)
MCH RBC QN AUTO: 31.1 PG (ref 26–35)
MCHC RBC AUTO-ENTMCNC: 33.9 % (ref 32–34.5)
MCV RBC AUTO: 91.9 FL (ref 80–99.9)
MONOCYTES ABSOLUTE: 1.31 E9/L (ref 0.1–0.95)
MONOCYTES RELATIVE PERCENT: 10.4 % (ref 2–12)
NEUTROPHILS ABSOLUTE: 10.35 E9/L (ref 1.8–7.3)
NEUTROPHILS RELATIVE PERCENT: 79.1 % (ref 43–80)
NITRITE, URINE: POSITIVE
PDW BLD-RTO: 13.5 FL (ref 11.5–15)
PH UA: 5.5 (ref 5–9)
PLATELET # BLD: 302 E9/L (ref 130–450)
PMV BLD AUTO: 9.5 FL (ref 7–12)
POLYCHROMASIA: ABNORMAL
POTASSIUM REFLEX MAGNESIUM: 3.5 MMOL/L (ref 3.5–5)
PROTEIN UA: 100 MG/DL
RBC # BLD: 4.05 E12/L (ref 3.5–5.5)
RBC UA: ABNORMAL /HPF (ref 0–2)
SODIUM BLD-SCNC: 142 MMOL/L (ref 132–146)
SPECIFIC GRAVITY UA: >=1.03 (ref 1–1.03)
TARGET CELLS: ABNORMAL
TOTAL PROTEIN: 6.7 G/DL (ref 6.4–8.3)
UROBILINOGEN, URINE: 2 E.U./DL
WBC # BLD: 13.1 E9/L (ref 4.5–11.5)
WBC UA: ABNORMAL /HPF (ref 0–5)

## 2021-09-10 PROCEDURE — 2060000000 HC ICU INTERMEDIATE R&B

## 2021-09-10 PROCEDURE — 80076 HEPATIC FUNCTION PANEL: CPT

## 2021-09-10 PROCEDURE — 83605 ASSAY OF LACTIC ACID: CPT

## 2021-09-10 PROCEDURE — 93005 ELECTROCARDIOGRAM TRACING: CPT | Performed by: STUDENT IN AN ORGANIZED HEALTH CARE EDUCATION/TRAINING PROGRAM

## 2021-09-10 PROCEDURE — 87040 BLOOD CULTURE FOR BACTERIA: CPT

## 2021-09-10 PROCEDURE — 96374 THER/PROPH/DIAG INJ IV PUSH: CPT

## 2021-09-10 PROCEDURE — G0378 HOSPITAL OBSERVATION PER HR: HCPCS

## 2021-09-10 PROCEDURE — P9612 CATHETERIZE FOR URINE SPEC: HCPCS

## 2021-09-10 PROCEDURE — 6360000004 HC RX CONTRAST MEDICATION: Performed by: RADIOLOGY

## 2021-09-10 PROCEDURE — 2580000003 HC RX 258: Performed by: STUDENT IN AN ORGANIZED HEALTH CARE EDUCATION/TRAINING PROGRAM

## 2021-09-10 PROCEDURE — 83690 ASSAY OF LIPASE: CPT

## 2021-09-10 PROCEDURE — 85025 COMPLETE CBC W/AUTO DIFF WBC: CPT

## 2021-09-10 PROCEDURE — 83735 ASSAY OF MAGNESIUM: CPT

## 2021-09-10 PROCEDURE — 96365 THER/PROPH/DIAG IV INF INIT: CPT

## 2021-09-10 PROCEDURE — 76705 ECHO EXAM OF ABDOMEN: CPT

## 2021-09-10 PROCEDURE — 74177 CT ABD & PELVIS W/CONTRAST: CPT

## 2021-09-10 PROCEDURE — 96375 TX/PRO/DX INJ NEW DRUG ADDON: CPT

## 2021-09-10 PROCEDURE — 71045 X-RAY EXAM CHEST 1 VIEW: CPT

## 2021-09-10 PROCEDURE — 6370000000 HC RX 637 (ALT 250 FOR IP): Performed by: STUDENT IN AN ORGANIZED HEALTH CARE EDUCATION/TRAINING PROGRAM

## 2021-09-10 PROCEDURE — 96361 HYDRATE IV INFUSION ADD-ON: CPT

## 2021-09-10 PROCEDURE — 6360000002 HC RX W HCPCS: Performed by: STUDENT IN AN ORGANIZED HEALTH CARE EDUCATION/TRAINING PROGRAM

## 2021-09-10 PROCEDURE — 99285 EMERGENCY DEPT VISIT HI MDM: CPT

## 2021-09-10 PROCEDURE — 81001 URINALYSIS AUTO W/SCOPE: CPT

## 2021-09-10 PROCEDURE — 80048 BASIC METABOLIC PNL TOTAL CA: CPT

## 2021-09-10 PROCEDURE — 2580000003 HC RX 258: Performed by: RADIOLOGY

## 2021-09-10 RX ORDER — 0.9 % SODIUM CHLORIDE 0.9 %
1000 INTRAVENOUS SOLUTION INTRAVENOUS ONCE
Status: COMPLETED | OUTPATIENT
Start: 2021-09-10 | End: 2021-09-10

## 2021-09-10 RX ORDER — ACETAMINOPHEN 650 MG/1
650 SUPPOSITORY RECTAL ONCE
Status: COMPLETED | OUTPATIENT
Start: 2021-09-10 | End: 2021-09-10

## 2021-09-10 RX ORDER — ONDANSETRON 2 MG/ML
4 INJECTION INTRAMUSCULAR; INTRAVENOUS ONCE
Status: COMPLETED | OUTPATIENT
Start: 2021-09-10 | End: 2021-09-10

## 2021-09-10 RX ORDER — SODIUM CHLORIDE 0.9 % (FLUSH) 0.9 %
10 SYRINGE (ML) INJECTION ONCE
Status: COMPLETED | OUTPATIENT
Start: 2021-09-10 | End: 2021-09-10

## 2021-09-10 RX ADMIN — ACETAMINOPHEN 650 MG: 650 SUPPOSITORY RECTAL at 21:16

## 2021-09-10 RX ADMIN — Medication 10 ML: at 22:45

## 2021-09-10 RX ADMIN — CEFTRIAXONE 1000 MG: 1 INJECTION, POWDER, FOR SOLUTION INTRAMUSCULAR; INTRAVENOUS at 23:11

## 2021-09-10 RX ADMIN — ONDANSETRON HYDROCHLORIDE 4 MG: 2 SOLUTION INTRAMUSCULAR; INTRAVENOUS at 21:18

## 2021-09-10 RX ADMIN — SODIUM CHLORIDE 1000 ML: 9 INJECTION, SOLUTION INTRAVENOUS at 21:15

## 2021-09-10 RX ADMIN — IOPAMIDOL 90 ML: 755 INJECTION, SOLUTION INTRAVENOUS at 22:45

## 2021-09-10 NOTE — LETTER
[place] Medicaid  CERTIFICATION OF NECESSITY  FOR NON-EMERGENCY TRANSPORTATION   BY GROUND AMBULANCE      Individual Information   1. Name: Janet Drake 2. [place] Medicaid Billing Number:    3. Address: [address] [address]      Transportation Provider Information   4. Provider Name:    5. [place] Provider Number:  National Provider Identifier (NPI):      Certification  7. Criteria:  During transport, this individual requires:  [x] Medical treatment or continuous     supervision by an EMT. [] The administration or regulation of oxygen by another person. [] Supervised protective restraint. 8. Period Beginning Date: 9/13/2021   9. Length  [x] Not more than 1 day(s)  [x] One Year     Additional Information Relevant to Certification   10. Comments or Explanations, If Necessary or Appropriate   Increased lethargy; hx of Dementia, A&Ox2       Certifying Practitioner Information   11. Name of Practitioner: Paresh Wagner MD   12. [place] Medicaid Provider Number, If Applicable:  [address] Provider Identifier (NPI):      Signature Information   14. Date of Signature: 9/13/2021 15. Name of Person Signing: YUSUF MixN,    16. Signature and Professional Designation: YUSUF Mix, [facility] [address]  Rev. 7/2015  [address] Encounter Date/Time: 9/10/2021 [address] Account: [de-identified]    MRN: [id number]    Patient: [address] [place] [address],[facility] Serial #: [id number]      ENCOUNTER          Patient Class: I Private Enc? No Unit RM BDVick Peter [facility] [id number]   Hospital Service:  INM   Encounter DX: UTI (urinary tract infec*   ADM Provider: Davey Duran MD   Procedure:     ATT Provider: Paresh Wagner MD   REF Provider:        Admission DX: UTI (urinary tract infection), bacterial, Sepsis ([facility] [address] Altered mental status, unspecified altered mental status type, Sepsis, due to unspecified organism, unspecified whether acute organ dysfunction present ([facility] [address] and DX codes: N39.0, A49.9, A41.9, R41.82, A41.9      PATIENT                 Name: Eloisa Guido : 1940 (79 yrs)   Address: [address] at [address] Sex: Female   City: [place] [address]         Marital Status:    Employer: UNEMPLOYED         Evangelical: [employer]   Primary Care Provider: Luisana Anders III,*         Primary Phone: [phone]   EMERGENCY CONTACT   Contact Name Legal Guardian? Relationship to Patient Home Phone Work Phone   1. estelleyasminroscoe  2. dayan desir No  No Brother/Sister  Niece/Nephew [phone]                 GUARANTOR            Guarantor: Eloisa Guido     : 1940   Address: [address]* Sex: Female     [place] [address]     Relation to Patient: Self       Home Phone: [phone]   Guarantor ID: [id number]       Work Phone:     Guarantor Employer: UNEMPLOYED         Status: NOT EMPLO*      COVERAGE        PRIMARY INSURANCE   Payor: [place] MEDICARE Plan: Kimberley [place]*   Payor Address: [facility] [id number], [place] [address]       Group Number: [id number] Insurance Type: [id number] Name: Sobeida Bolton : 1940   Subscriber ID: Kendarlin Davidson. Rel. to Sub: Self   SECONDARY INSURANCE   Payor: [place]ICAID [place] Plan: [facility], [facility] DEPT OF*   Payor Address:  [place] [address]          Group Number:   Insurance Type: INDEMNITY   Subscriber Name: Sobeida Bolton : 1940   Subscriber ID: [id number] Pat.  Rel. to Sub: SELF           CSN: [id number]

## 2021-09-11 ENCOUNTER — APPOINTMENT (OUTPATIENT)
Dept: CT IMAGING | Age: 81
DRG: 641 | End: 2021-09-11
Payer: MEDICARE

## 2021-09-11 LAB
ANION GAP SERPL CALCULATED.3IONS-SCNC: 10 MMOL/L (ref 7–16)
BASOPHILS ABSOLUTE: 0.04 E9/L (ref 0–0.2)
BASOPHILS RELATIVE PERCENT: 0.4 % (ref 0–2)
BUN BLDV-MCNC: 13 MG/DL (ref 6–23)
CALCIUM SERPL-MCNC: 8.5 MG/DL (ref 8.6–10.2)
CHLORIDE BLD-SCNC: 109 MMOL/L (ref 98–107)
CO2: 25 MMOL/L (ref 22–29)
CREAT SERPL-MCNC: 0.8 MG/DL (ref 0.5–1)
EOSINOPHILS ABSOLUTE: 0.01 E9/L (ref 0.05–0.5)
EOSINOPHILS RELATIVE PERCENT: 0.1 % (ref 0–6)
GFR AFRICAN AMERICAN: >60
GFR NON-AFRICAN AMERICAN: >60 ML/MIN/1.73
GLUCOSE BLD-MCNC: 91 MG/DL (ref 74–99)
HCT VFR BLD CALC: 29.4 % (ref 34–48)
HEMOGLOBIN: 9.6 G/DL (ref 11.5–15.5)
IMMATURE GRANULOCYTES #: 0.06 E9/L
IMMATURE GRANULOCYTES %: 0.6 % (ref 0–5)
LACTIC ACID, SEPSIS: 0.9 MMOL/L (ref 0.5–1.9)
LYMPHOCYTES ABSOLUTE: 2.24 E9/L (ref 1.5–4)
LYMPHOCYTES RELATIVE PERCENT: 21.4 % (ref 20–42)
MAGNESIUM: 2.1 MG/DL (ref 1.6–2.6)
MCH RBC QN AUTO: 31 PG (ref 26–35)
MCHC RBC AUTO-ENTMCNC: 32.7 % (ref 32–34.5)
MCV RBC AUTO: 94.8 FL (ref 80–99.9)
MONOCYTES ABSOLUTE: 1.44 E9/L (ref 0.1–0.95)
MONOCYTES RELATIVE PERCENT: 13.7 % (ref 2–12)
NEUTROPHILS ABSOLUTE: 6.69 E9/L (ref 1.8–7.3)
NEUTROPHILS RELATIVE PERCENT: 63.8 % (ref 43–80)
PDW BLD-RTO: 13.7 FL (ref 11.5–15)
PLATELET # BLD: 242 E9/L (ref 130–450)
PMV BLD AUTO: 9.7 FL (ref 7–12)
POTASSIUM REFLEX MAGNESIUM: 3.3 MMOL/L (ref 3.5–5)
RBC # BLD: 3.1 E12/L (ref 3.5–5.5)
SODIUM BLD-SCNC: 144 MMOL/L (ref 132–146)
WBC # BLD: 10.5 E9/L (ref 4.5–11.5)

## 2021-09-11 PROCEDURE — 83735 ASSAY OF MAGNESIUM: CPT

## 2021-09-11 PROCEDURE — 2580000003 HC RX 258: Performed by: PHYSICIAN ASSISTANT

## 2021-09-11 PROCEDURE — 2580000003 HC RX 258: Performed by: INTERNAL MEDICINE

## 2021-09-11 PROCEDURE — 83605 ASSAY OF LACTIC ACID: CPT

## 2021-09-11 PROCEDURE — G0378 HOSPITAL OBSERVATION PER HR: HCPCS

## 2021-09-11 PROCEDURE — 2060000000 HC ICU INTERMEDIATE R&B

## 2021-09-11 PROCEDURE — 36415 COLL VENOUS BLD VENIPUNCTURE: CPT

## 2021-09-11 PROCEDURE — 96372 THER/PROPH/DIAG INJ SC/IM: CPT

## 2021-09-11 PROCEDURE — 96375 TX/PRO/DX INJ NEW DRUG ADDON: CPT

## 2021-09-11 PROCEDURE — 96376 TX/PRO/DX INJ SAME DRUG ADON: CPT

## 2021-09-11 PROCEDURE — 6360000002 HC RX W HCPCS: Performed by: PHYSICIAN ASSISTANT

## 2021-09-11 PROCEDURE — 6370000000 HC RX 637 (ALT 250 FOR IP): Performed by: PHYSICIAN ASSISTANT

## 2021-09-11 PROCEDURE — 96366 THER/PROPH/DIAG IV INF ADDON: CPT

## 2021-09-11 PROCEDURE — 85025 COMPLETE CBC W/AUTO DIFF WBC: CPT

## 2021-09-11 PROCEDURE — 80048 BASIC METABOLIC PNL TOTAL CA: CPT

## 2021-09-11 PROCEDURE — 2700000000 HC OXYGEN THERAPY PER DAY

## 2021-09-11 PROCEDURE — 70450 CT HEAD/BRAIN W/O DYE: CPT

## 2021-09-11 PROCEDURE — 87040 BLOOD CULTURE FOR BACTERIA: CPT

## 2021-09-11 RX ORDER — IBUPROFEN 400 MG/1
600 TABLET ORAL EVERY 6 HOURS PRN
Status: DISCONTINUED | OUTPATIENT
Start: 2021-09-11 | End: 2021-09-13 | Stop reason: HOSPADM

## 2021-09-11 RX ORDER — SODIUM CHLORIDE, SODIUM LACTATE, POTASSIUM CHLORIDE, CALCIUM CHLORIDE 600; 310; 30; 20 MG/100ML; MG/100ML; MG/100ML; MG/100ML
INJECTION, SOLUTION INTRAVENOUS CONTINUOUS
Status: DISCONTINUED | OUTPATIENT
Start: 2021-09-11 | End: 2021-09-13 | Stop reason: HOSPADM

## 2021-09-11 RX ORDER — ACETAMINOPHEN 325 MG/1
650 TABLET ORAL EVERY 6 HOURS PRN
Status: DISCONTINUED | OUTPATIENT
Start: 2021-09-11 | End: 2021-09-13 | Stop reason: HOSPADM

## 2021-09-11 RX ORDER — SODIUM CHLORIDE 0.9 % (FLUSH) 0.9 %
10 SYRINGE (ML) INJECTION EVERY 12 HOURS SCHEDULED
Status: DISCONTINUED | OUTPATIENT
Start: 2021-09-11 | End: 2021-09-13 | Stop reason: HOSPADM

## 2021-09-11 RX ORDER — ASPIRIN 81 MG/1
81 TABLET ORAL DAILY
Status: DISCONTINUED | OUTPATIENT
Start: 2021-09-12 | End: 2021-09-13 | Stop reason: HOSPADM

## 2021-09-11 RX ORDER — ACETAMINOPHEN 650 MG/1
650 SUPPOSITORY RECTAL EVERY 6 HOURS PRN
Status: DISCONTINUED | OUTPATIENT
Start: 2021-09-11 | End: 2021-09-13 | Stop reason: HOSPADM

## 2021-09-11 RX ORDER — POTASSIUM CHLORIDE 20 MEQ/1
40 TABLET, EXTENDED RELEASE ORAL PRN
Status: DISCONTINUED | OUTPATIENT
Start: 2021-09-11 | End: 2021-09-13 | Stop reason: HOSPADM

## 2021-09-11 RX ORDER — DONEPEZIL HYDROCHLORIDE 5 MG/1
10 TABLET, FILM COATED ORAL NIGHTLY
Status: DISCONTINUED | OUTPATIENT
Start: 2021-09-11 | End: 2021-09-13 | Stop reason: HOSPADM

## 2021-09-11 RX ORDER — DIVALPROEX SODIUM 250 MG/1
250 TABLET, DELAYED RELEASE ORAL 3 TIMES DAILY
Status: DISCONTINUED | OUTPATIENT
Start: 2021-09-11 | End: 2021-09-13 | Stop reason: HOSPADM

## 2021-09-11 RX ORDER — SODIUM CHLORIDE 9 MG/ML
25 INJECTION, SOLUTION INTRAVENOUS PRN
Status: DISCONTINUED | OUTPATIENT
Start: 2021-09-11 | End: 2021-09-13 | Stop reason: HOSPADM

## 2021-09-11 RX ORDER — SODIUM CHLORIDE 0.9 % (FLUSH) 0.9 %
10 SYRINGE (ML) INJECTION PRN
Status: DISCONTINUED | OUTPATIENT
Start: 2021-09-11 | End: 2021-09-13 | Stop reason: HOSPADM

## 2021-09-11 RX ORDER — HYDRALAZINE HYDROCHLORIDE 20 MG/ML
10 INJECTION INTRAMUSCULAR; INTRAVENOUS EVERY 6 HOURS PRN
Status: DISCONTINUED | OUTPATIENT
Start: 2021-09-11 | End: 2021-09-13 | Stop reason: HOSPADM

## 2021-09-11 RX ORDER — POTASSIUM CHLORIDE 7.45 MG/ML
10 INJECTION INTRAVENOUS PRN
Status: DISCONTINUED | OUTPATIENT
Start: 2021-09-11 | End: 2021-09-13 | Stop reason: HOSPADM

## 2021-09-11 RX ORDER — LORATADINE 10 MG/1
10 TABLET ORAL DAILY
Status: DISCONTINUED | OUTPATIENT
Start: 2021-09-11 | End: 2021-09-11 | Stop reason: CLARIF

## 2021-09-11 RX ORDER — MIRTAZAPINE 15 MG/1
15 TABLET, FILM COATED ORAL NIGHTLY
Status: DISCONTINUED | OUTPATIENT
Start: 2021-09-11 | End: 2021-09-13 | Stop reason: HOSPADM

## 2021-09-11 RX ORDER — CETIRIZINE HYDROCHLORIDE 10 MG/1
10 TABLET ORAL DAILY
Status: DISCONTINUED | OUTPATIENT
Start: 2021-09-12 | End: 2021-09-13 | Stop reason: HOSPADM

## 2021-09-11 RX ORDER — DIPHENHYDRAMINE HCL 25 MG
25 TABLET ORAL NIGHTLY PRN
Status: DISCONTINUED | OUTPATIENT
Start: 2021-09-11 | End: 2021-09-13 | Stop reason: HOSPADM

## 2021-09-11 RX ADMIN — POTASSIUM BICARBONATE 40 MEQ: 782 TABLET, EFFERVESCENT ORAL at 16:28

## 2021-09-11 RX ADMIN — DONEPEZIL HYDROCHLORIDE 10 MG: 5 TABLET, FILM COATED ORAL at 21:06

## 2021-09-11 RX ADMIN — ENOXAPARIN SODIUM 40 MG: 40 INJECTION SUBCUTANEOUS at 16:28

## 2021-09-11 RX ADMIN — WATER 1000 MG: 1 INJECTION INTRAMUSCULAR; INTRAVENOUS; SUBCUTANEOUS at 23:47

## 2021-09-11 RX ADMIN — SODIUM CHLORIDE 25 ML: 9 INJECTION, SOLUTION INTRAVENOUS at 06:00

## 2021-09-11 RX ADMIN — DIVALPROEX SODIUM 250 MG: 250 TABLET, DELAYED RELEASE ORAL at 21:06

## 2021-09-11 RX ADMIN — MIRTAZAPINE 15 MG: 15 TABLET, FILM COATED ORAL at 21:06

## 2021-09-11 RX ADMIN — HYDRALAZINE HYDROCHLORIDE 10 MG: 20 INJECTION INTRAMUSCULAR; INTRAVENOUS at 23:53

## 2021-09-11 RX ADMIN — DIVALPROEX SODIUM 250 MG: 250 TABLET, DELAYED RELEASE ORAL at 16:28

## 2021-09-11 RX ADMIN — SODIUM CHLORIDE, POTASSIUM CHLORIDE, SODIUM LACTATE AND CALCIUM CHLORIDE: 600; 310; 30; 20 INJECTION, SOLUTION INTRAVENOUS at 10:30

## 2021-09-11 ASSESSMENT — PAIN SCALES - GENERAL
PAINLEVEL_OUTOF10: 0

## 2021-09-11 NOTE — H&P
Admission History and Physical                                                                                    Naheed Vinson MD, FACP                   Patient Name: Ivania Tucker                   Age:  79 y.o. Gender:   female    CC: Altered mental status and fever    HPI: Patient's history is obtained from multiple sources due to her dementia  She is currently awake and interactive and can answer questions appropriately to a limited degree. She does not know why she is here except she states she fell  She is aware that she is in the hospital  According to the chart she was sent from skilled nursing facility because of elevated white cell count and CRP along with lethargy  In the emergency room she had a temperature of 100.8, leukocytosis of 13.1 which has subsequently normalized. She had just been discharged from the hospital a few days prior  She was treated for cholelithiasis, demonstrated elevated liver functions. However at this admission her liver function studies appear to be normal    She has received fluids for hydration as well as antibiotics. This morning likely due to hydration her potassium is dropped to 3.3  Hemoglobin is dilutional from 12.6-29.4    At this time she does not appear to be ill    History reviewed. No pertinent past medical history. History reviewed. No pertinent surgical history. No family status information on file. Prior to Admission medications    Medication Sig Start Date End Date Taking?  Authorizing Provider   aspirin 81 MG EC tablet Take 81 mg by mouth daily   Yes Historical Provider, MD   bisacodyl (DULCOLAX) 5 MG EC tablet Take 5 mg by mouth daily as needed for Constipation   Yes Historical Provider, MD   loratadine (CLARITIN) 10 MG tablet Take 10 mg by mouth daily   Yes Historical Provider, MD   docusate sodium (COLACE) 100 MG capsule Take 100 mg by mouth 2 times daily   Yes Historical Provider, MD   divalproex (DEPAKOTE) 250 MG DR tablet Take 250 mg by mouth 3 times daily   Yes Historical Provider, MD   ibuprofen (ADVIL;MOTRIN) 600 MG tablet Take 600 mg by mouth every 6 hours as needed for Pain   Yes Historical Provider, MD   loperamide (IMODIUM) 2 MG capsule Take 2 mg by mouth 4 times daily as needed for Diarrhea   Yes Historical Provider, MD   magnesium hydroxide (MILK OF MAGNESIA) 400 MG/5ML suspension Take by mouth daily as needed for Constipation   Yes Historical Provider, MD   ondansetron (ZOFRAN) 4 MG tablet Take 4 mg by mouth every 8 hours as needed for Nausea or Vomiting   Yes Historical Provider, MD   mirtazapine (REMERON) 15 MG tablet Take 15 mg by mouth nightly   Yes Historical Provider, MD   Vitamin D, Cholecalciferol, 50 MCG (2000 UT) CAPS Take 2,000 Units by mouth daily    Yes Historical Provider, MD   donepezil (ARICEPT) 10 MG tablet Take 10 mg by mouth nightly    Yes Historical Provider, MD   acetaminophen (TYLENOL) 325 MG tablet Take 650 mg by mouth every 6 hours as needed for Pain    Yes Historical Provider, MD   diphenhydrAMINE (BENADRYL) 25 MG capsule Take 25 mg by mouth nightly as needed for Sleep   Yes Historical Provider, MD        Social History     Socioeconomic History    Marital status:       Spouse name: None    Number of children: None    Years of education: None    Highest education level: None   Occupational History    None   Tobacco Use    Smoking status: Never Smoker    Smokeless tobacco: Never Used   Substance and Sexual Activity    Alcohol use: Never    Drug use: Never    Sexual activity: None   Other Topics Concern    None   Social History Narrative    None     Social Determinants of Health     Financial Resource Strain:     Difficulty of Paying Living Expenses:    Food Insecurity:     Worried About Running Out of Food in the Last Year:     Ran Out of Food in the Last Year:    Transportation Needs:     Lack of Transportation (Medical):  Lack of Transportation (Non-Medical):    Physical Activity:     Days of Exercise per Week:     Minutes of Exercise per Session:    Stress:     Feeling of Stress :    Social Connections:     Frequency of Communication with Friends and Family:     Frequency of Social Gatherings with Friends and Family:     Attends [employer] Services:     Active Member of Clubs or Organizations:     Attends Club or Organization Meetings:     Marital Status:    Intimate Partner Violence:     Fear of Current or Ex-Partner:     Emotionally Abused:     Physically Abused:     Sexually Abused:        No Known Allergies    The patient's medical records have been reviewed contingent on availability    Review of Systems:   · General: Very limited due to her condition. She denies any specific symptoms at this time except for some soreness of the right arm      Physical Examination:      Wt Readings from Last 3 Encounters:   09/10/21 134 lb (60.8 kg)   07/18/20 134 lb (60.8 kg)   01/27/20 134 lb (60.8 kg)     Temp Readings from Last 3 Encounters:   09/11/21 97.9 °F (36.6 °C) (Temporal)   09/08/21 98.7 °F (37.1 °C) (Temporal)   07/18/20 97.8 °F (36.6 °C) (Temporal)     BP Readings from Last 3 Encounters:   09/11/21 (!) 146/68   09/08/21 (!) 147/59   07/18/20 (!) 163/53     Pulse Readings from Last 3 Encounters:   09/11/21 72   09/08/21 67   07/18/20 55       General appearance: Somewhat frail-appearing female she is in no distress, she is slow to answer somewhat confused but calm and cooperative  Skin: Color, texture, turgor normal. No rashes or lesions. Head: Normocephalic. No masses, lesions, tenderness or abnormalities   Face: Symmetric no visible lesions. Her head is turned to the left she is reluctant to turn it to the right and does not turn beyond midpoint  Eyes: Conjunctivae/cornea clear.   Her pupils are quite myotic and do not react  Ears: External appearance normal.  Hearing grossly normal  Nose/Sinuses: Nares normal. No paranasal sinus tenderness. Mouth: Lips and tongue appear normal.   Neck:  Symmetric. No adenopathy. Thyroid symmetric, normal size, without nodules. Trachea is midline. Chest: Even excursion   Lungs: Clear to auscultation. No rhonchi, crackles or rales. Heart: S1 > S2. Rhythm is regular and rate is normal. No gallop rub or murmur. Abdomen: Soft, mildly protuberant, non-tender. BS normal. No masses, organomegaly. Anatomic contours appear normal.  Extremities: No deformities, edema, or skin discoloration. Peripheral perfusion assessed in all exremities. No cyanosis  Musculoskeletal: No unusual pain or swelling. Muscular strength intact. Neuro:   · Cranial nerves grossly intact.    · Motor: Strength clearly has a deficit of the right arm lack of movement and flaccid  · Sensory: Grossly no deficit cannot adequately assess  · Cerebellar testing was abnormal  Mental status: She is awake slow to respond confused and incapable of self determination  Mood: Flat  Gait & balance: not assessed:     Labs     CBC:   Lab Results   Component Value Date    WBC 10.5 09/11/2021    RBC 3.10 09/11/2021    HGB 9.6 09/11/2021    HCT 29.4 09/11/2021     09/11/2021    MCV 94.8 09/11/2021     BMP:    Lab Results   Component Value Date     09/11/2021    K 3.3 09/11/2021     09/11/2021    CO2 25 09/11/2021    BUN 13 09/11/2021    CREATININE 0.8 09/11/2021    GLUCOSE 91 09/11/2021    CALCIUM 8.5 09/11/2021     Hepatic Function Panel:    Lab Results   Component Value Date    ALKPHOS 150 09/10/2021    AST 24 09/10/2021    ALT 26 09/10/2021    PROT 6.7 09/10/2021    LABALBU 3.6 09/10/2021    BILITOT 0.5 09/10/2021     Magnesium:    Lab Results   Component Value Date    MG 2.1 09/11/2021     Cardiac Enzymes:   Lab Results   Component Value Date    TROPONINI <0.01 01/27/2020     LDH:  No results found for: LDH  PT/INR:    Lab Results   Component Value Date    PROTIME 11.2 01/27/2020    INR 1.0 01/27/2020 BNP: No results for input(s): BNP in the last 72 hours. TSH: No results found for: TSH   Cardiac Injury Profile: No results for input(s): CKTOTAL, CKMB, CKMBINDEX, TROPONINI in the last 72 hours.    Lipid Profile:   Lab Results   Component Value Date    TRIG 123 09/06/2021    HDL 44 09/06/2021    LDLCALC 97 09/06/2021    CHOL 166 09/06/2021      Hemoglobin A1C: No components found for: HGBA1C   U/A:   Lab Results   Component Value Date    LEUKOCYTESUR TRACE 09/10/2021    PHUR 5.5 09/10/2021    WBCUA 2-5 09/10/2021    RBCUA 1-3 09/10/2021    BACTERIA MANY 09/10/2021    SPECGRAV >=1.030 09/10/2021    BLOODU MODERATE 09/10/2021    GLUCOSEU Negative 09/10/2021         ADMISSION SCHEDULED MEDS:   Current Facility-Administered Medications   Medication Dose Route Frequency Provider Last Rate Last Admin    aspirin EC tablet 81 mg  81 mg Oral Daily NOEMI Reinoso        diphenhydrAMINE (BENADRYL) tablet 25 mg  25 mg Oral Nightly PRN NOEMI Reinoso        divalproex (DEPAKOTE) DR tablet 250 mg  250 mg Oral TID NOEMI Reinoso        donepezil (ARICEPT) tablet 10 mg  10 mg Oral Nightly NOEMI Reinoso        ibuprofen (ADVIL;MOTRIN) tablet 600 mg  600 mg Oral Q6H PRN NOEMI Reinoso        mirtazapine (REMERON) tablet 15 mg  15 mg Oral Nightly Ayo Reinoso        sodium chloride flush 0.9 % injection 10 mL  10 mL IntraVENous 2 times per day NOEMI Reinoso        sodium chloride flush 0.9 % injection 10 mL  10 mL IntraVENous PRN NOEMI Reinoso        0.9 % sodium chloride infusion  25 mL IntraVENous PRN NOEMI Reinoso 100 mL/hr at 09/11/21 0600 25 mL at 09/11/21 0600    potassium chloride (KLOR-CON M) extended release tablet 40 mEq  40 mEq Oral PRN NOEMI Reinoso        Or    potassium bicarb-citric acid (EFFER-K) effervescent tablet 40 mEq  40 mEq Oral PRN NOEMI Reinoso        Or    potassium chloride 10 mEq/100 mL IVPB (Peripheral Line)  10 mEq IntraVENous PRN Carrillo Collier, PA        enoxaparin (LOVENOX) injection 40 mg  40 mg SubCUTAneous Daily Vizc[facility]o [facility]        magnesium hydroxide (MILK OF MAGNESIA) 400 MG/5ML suspension 30 mL  30 mL Oral Daily PRN Vizcaino NOEMI Collier        acetaminophen (TYLENOL) tablet 650 mg  650 mg Oral Q6H PRN [facility], [facility]        Or    acetaminophen (TYLENOL) suppository 650 mg  650 mg Rectal Q6H PRN [facility], [facility]        cefTRIAXone (ROCEPHIN) 1,000 mg in sterile water 10 mL IV syringe  1,000 mg IntraVENous Q24H Vizcaino NOEMI Collier        hydrALAZINE (APRESOLINE) injection 10 mg  10 mg IntraVENous Q6H PRN [facility], [facility]        trimethobenzamide Rosa Dural) injection 200 mg  200 mg IntraMUSCular Q6H PRN [facility], [facility]        cetirizine (ZYRTEC) tablet 10 mg  10 mg Oral Daily Shawna Cedeno MD           Current  Infusions   sodium chloride 25 mL (09/11/21 0600)       Prn Meds  diphenhydrAMINE, ibuprofen, sodium chloride flush, sodium chloride, potassium chloride **OR** potassium alternative oral replacement **OR** potassium chloride, magnesium hydroxide, acetaminophen **OR** acetaminophen, hydrALAZINE, trimethobenzamide    Radiology Review:  CT ABDOMEN PELVIS W IV CONTRAST Additional Contrast? None   Final Result   Cholelithiasis. Unchanged mild intrahepatic bile duct dilatation and moderate extrahepatic   bile duct dilatation extending to the ampulla. No CT evidence of   choledocholithiasis or an obstructing mass. Correlate clinically for   cholestasis. Follow-up MRCP would be helpful. Moderate sigmoid colon diverticulosis with no evidence of diverticulitis. Mild bibasilar dependent airspace disease consistent with atelectasis and or   pneumonia. US GALLBLADDER RUQ   Final Result   Echogenic foci along the gallbladder wall may represent small polyps versus   adherent stones. Superimposed adenomyomatosis of the gallbladder cannot be   excluded.          XR CHEST PORTABLE Final Result   No acute process. 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    Problem list:  Patient Active Problem List   Diagnosis    Essential hypertension    Acute colitis    UTI (urinary tract infection)    Nausea and vomiting    Depression    Dementia ([address]    Cholelithiases    HTN (hypertension)    Leukocytosis    Shingles    Biliary colic    Sepsis ([address]       PLAN:   Establish whether the urine was a straight cath specimen to determine if this is truly a urinary tract infection however it does not appear to be:  The resulted urine is a clean-catch which is not possible in a patient with this cognitive status    Continue antibiotics until blood cultures and urine cultures are available  Hydration  Cannot assess if her urine neurologic deficit is old or new  Based on the CT scan it is consistent with multi-infarct syndrome however there is no description of deficits noted previously        See  Orders  Yoni Chester MD, Edda Alvarez, American Board of Internal Medicine  Diplomate, [address] of Internal Medicine  9:50 AM  9/11/2021

## 2021-09-11 NOTE — PLAN OF CARE
Problem: Falls - Risk of:  Goal: Will remain free from falls  Description: Will remain free from falls  Outcome: Ongoing  Goal: Absence of physical injury  Description: Absence of physical injury  Outcome: Ongoing     Problem: Sensory:  Goal: General experience of comfort will improve  Description: General experience of comfort will improve  Outcome: Ongoing     Problem: Urinary Elimination:  Goal: Signs and symptoms of infection will decrease  Description: Signs and symptoms of infection will decrease  Outcome: Ongoing  Goal: Ability to reestablish a normal urinary elimination pattern will improve - after catheter removal  Description: Ability to reestablish a normal urinary elimination pattern will improve  Outcome: Ongoing  Goal: Complications related to the disease process, condition or treatment will be avoided or minimized  Description: Complications related to the disease process, condition or treatment will be avoided or minimized  Outcome: Ongoing     Problem: Skin Integrity:  Goal: Will show no infection signs and symptoms  Description: Will show no infection signs and symptoms  Outcome: Ongoing  Goal: Absence of new skin breakdown  Description: Absence of new skin breakdown  Outcome: Ongoing

## 2021-09-11 NOTE — ED NOTES
Bed: 24  Expected date:   Expected time:   Means of arrival:   Comments:  Terminal      New Rojas RN  09/10/21 2000

## 2021-09-12 ENCOUNTER — APPOINTMENT (OUTPATIENT)
Dept: GENERAL RADIOLOGY | Age: 81
DRG: 641 | End: 2021-09-12
Payer: MEDICARE

## 2021-09-12 LAB
ANION GAP SERPL CALCULATED.3IONS-SCNC: 7 MMOL/L (ref 7–16)
BILIRUBIN URINE: NEGATIVE
BLOOD, URINE: NEGATIVE
BUN BLDV-MCNC: 13 MG/DL (ref 6–23)
CALCIUM SERPL-MCNC: 8.9 MG/DL (ref 8.6–10.2)
CHLORIDE BLD-SCNC: 106 MMOL/L (ref 98–107)
CLARITY: CLEAR
CO2: 29 MMOL/L (ref 22–29)
COLOR: YELLOW
CREAT SERPL-MCNC: 0.8 MG/DL (ref 0.5–1)
GFR AFRICAN AMERICAN: >60
GFR NON-AFRICAN AMERICAN: >60 ML/MIN/1.73
GLUCOSE BLD-MCNC: 84 MG/DL (ref 74–99)
GLUCOSE URINE: NEGATIVE MG/DL
KETONES, URINE: ABNORMAL MG/DL
LEUKOCYTE ESTERASE, URINE: NEGATIVE
NITRITE, URINE: NEGATIVE
PH UA: 7 (ref 5–9)
POTASSIUM REFLEX MAGNESIUM: 3.6 MMOL/L (ref 3.5–5)
PROTEIN UA: NEGATIVE MG/DL
SODIUM BLD-SCNC: 142 MMOL/L (ref 132–146)
SPECIFIC GRAVITY UA: 1.02 (ref 1–1.03)
UROBILINOGEN, URINE: >=8 E.U./DL

## 2021-09-12 PROCEDURE — 6370000000 HC RX 637 (ALT 250 FOR IP): Performed by: PHYSICIAN ASSISTANT

## 2021-09-12 PROCEDURE — 6360000002 HC RX W HCPCS: Performed by: PHYSICIAN ASSISTANT

## 2021-09-12 PROCEDURE — G0378 HOSPITAL OBSERVATION PER HR: HCPCS

## 2021-09-12 PROCEDURE — 81003 URINALYSIS AUTO W/O SCOPE: CPT

## 2021-09-12 PROCEDURE — 73060 X-RAY EXAM OF HUMERUS: CPT

## 2021-09-12 PROCEDURE — 2700000000 HC OXYGEN THERAPY PER DAY

## 2021-09-12 PROCEDURE — 96372 THER/PROPH/DIAG INJ SC/IM: CPT

## 2021-09-12 PROCEDURE — 2580000003 HC RX 258: Performed by: INTERNAL MEDICINE

## 2021-09-12 PROCEDURE — 2060000000 HC ICU INTERMEDIATE R&B

## 2021-09-12 PROCEDURE — 2580000003 HC RX 258: Performed by: PHYSICIAN ASSISTANT

## 2021-09-12 PROCEDURE — 80048 BASIC METABOLIC PNL TOTAL CA: CPT

## 2021-09-12 PROCEDURE — 6370000000 HC RX 637 (ALT 250 FOR IP): Performed by: INTERNAL MEDICINE

## 2021-09-12 PROCEDURE — 73070 X-RAY EXAM OF ELBOW: CPT

## 2021-09-12 PROCEDURE — 36415 COLL VENOUS BLD VENIPUNCTURE: CPT

## 2021-09-12 RX ORDER — SULFAMETHOXAZOLE AND TRIMETHOPRIM 800; 160 MG/1; MG/1
1 TABLET ORAL EVERY 12 HOURS SCHEDULED
Status: DISCONTINUED | OUTPATIENT
Start: 2021-09-12 | End: 2021-09-13 | Stop reason: HOSPADM

## 2021-09-12 RX ADMIN — SULFAMETHOXAZOLE AND TRIMETHOPRIM 1 TABLET: 800; 160 TABLET ORAL at 21:26

## 2021-09-12 RX ADMIN — CETIRIZINE HYDROCHLORIDE 10 MG: 10 TABLET, FILM COATED ORAL at 09:02

## 2021-09-12 RX ADMIN — MIRTAZAPINE 15 MG: 15 TABLET, FILM COATED ORAL at 21:27

## 2021-09-12 RX ADMIN — DIVALPROEX SODIUM 250 MG: 250 TABLET, DELAYED RELEASE ORAL at 09:03

## 2021-09-12 RX ADMIN — ACETAMINOPHEN 650 MG: 325 TABLET ORAL at 09:02

## 2021-09-12 RX ADMIN — ENOXAPARIN SODIUM 40 MG: 40 INJECTION SUBCUTANEOUS at 09:03

## 2021-09-12 RX ADMIN — SULFAMETHOXAZOLE AND TRIMETHOPRIM 1 TABLET: 800; 160 TABLET ORAL at 12:22

## 2021-09-12 RX ADMIN — SODIUM CHLORIDE, POTASSIUM CHLORIDE, SODIUM LACTATE AND CALCIUM CHLORIDE: 600; 310; 30; 20 INJECTION, SOLUTION INTRAVENOUS at 18:55

## 2021-09-12 RX ADMIN — SODIUM CHLORIDE, PRESERVATIVE FREE 10 ML: 5 INJECTION INTRAVENOUS at 09:03

## 2021-09-12 RX ADMIN — DONEPEZIL HYDROCHLORIDE 10 MG: 5 TABLET, FILM COATED ORAL at 21:27

## 2021-09-12 RX ADMIN — DIVALPROEX SODIUM 250 MG: 250 TABLET, DELAYED RELEASE ORAL at 21:26

## 2021-09-12 RX ADMIN — DIVALPROEX SODIUM 250 MG: 250 TABLET, DELAYED RELEASE ORAL at 13:03

## 2021-09-12 RX ADMIN — ACETAMINOPHEN 650 MG: 325 TABLET ORAL at 21:28

## 2021-09-12 RX ADMIN — SODIUM CHLORIDE, POTASSIUM CHLORIDE, SODIUM LACTATE AND CALCIUM CHLORIDE: 600; 310; 30; 20 INJECTION, SOLUTION INTRAVENOUS at 00:47

## 2021-09-12 RX ADMIN — ASPIRIN 81 MG: 81 TABLET, COATED ORAL at 09:03

## 2021-09-12 ASSESSMENT — PAIN DESCRIPTION - ORIENTATION: ORIENTATION: RIGHT

## 2021-09-12 ASSESSMENT — PAIN SCALES - GENERAL
PAINLEVEL_OUTOF10: 0
PAINLEVEL_OUTOF10: 3
PAINLEVEL_OUTOF10: 7
PAINLEVEL_OUTOF10: 0

## 2021-09-12 ASSESSMENT — PAIN DESCRIPTION - LOCATION
LOCATION: KNEE
LOCATION: NECK

## 2021-09-12 ASSESSMENT — PAIN DESCRIPTION - DESCRIPTORS: DESCRIPTORS: ACHING

## 2021-09-12 ASSESSMENT — PAIN DESCRIPTION - PAIN TYPE: TYPE: ACUTE PAIN

## 2021-09-12 NOTE — PLAN OF CARE
Problem: Falls - Risk of:  Goal: Will remain free from falls  Description: Will remain free from falls  9/12/2021 1439 by Yobany Callahan RN  Outcome: Met This Shift  9/12/2021 0203 by Deidre Cea  Outcome: Met This Shift  Goal: Absence of physical injury  Description: Absence of physical injury  9/12/2021 1439 by Yobany Callahan RN  Outcome: Met This Shift  9/12/2021 0203 by Deidre Cea  Outcome: Met This Shift     Problem: Sensory:  Goal: General experience of comfort will improve  Description: General experience of comfort will improve  Outcome: Met This Shift     Problem: Urinary Elimination:  Goal: Signs and symptoms of infection will decrease  Description: Signs and symptoms of infection will decrease  9/12/2021 1439 by Yobany Callahan RN  Outcome: Met This Shift  9/12/2021 0203 by Deidre Cea  Outcome: Met This Shift     Problem: Skin Integrity:  Goal: Will show no infection signs and symptoms  Description: Will show no infection signs and symptoms  Outcome: Met This Shift  Goal: Absence of new skin breakdown  Description: Absence of new skin breakdown  Outcome: Met This Shift

## 2021-09-12 NOTE — PROGRESS NOTES
Brandon Cardenas MD, [facility]                   Patient Name: Tomer Goodman                   Age:  79 y.o. Gender:   female    CC: Altered mental status and fever    HPI: Patient's history is obtained from multiple sources due to her dementia  She is currently awake and interactive and can answer questions appropriately to a limited degree. She does not know why she is here except she states she fell  She is aware that she is in the hospital  According to the chart she was sent from skilled nursing facility because of elevated white cell count and CRP along with lethargy  In the emergency room she had a temperature of 100.8, leukocytosis of 13.1 which has subsequently normalized. She had just been discharged from the hospital a few days prior  She was treated for cholelithiasis, demonstrated elevated liver functions. However at this admission her liver function studies appear to be normal    She has received fluids for hydration as well as antibiotics. This morning likely due to hydration her potassium is dropped to 3.3  Hemoglobin is dilutional from 12.6-29.4    At this time she does not appear to be ill    9/12  Patient's cognitive status remains stable  She is awake slow to respond but interactive does answer questions inconsistently  She expresses no pain no discomfort    Update of her labs indicate that her basic metabolic panel is now normal  Glucose appears to be normal  White cell count is 10.5 hemoglobin 9.6 likely dilutional  I had requested straight cath urine not available  There has been no further fever blood pressure is normal      History reviewed. No pertinent past medical history. History reviewed. No pertinent surgical history. No family status information on file. Review of Systems:   · General: Very limited due to her condition.   She denies any specific symptoms at this time except for some soreness of the right arm      Physical Examination:      Wt Readings from Last 3 Encounters:   09/12/21 167 lb 4 oz (75.9 kg)   07/18/20 134 lb (60.8 kg)   01/27/20 134 lb (60.8 kg)     Temp Readings from Last 3 Encounters:   09/12/21 99.2 °F (37.3 °C) (Oral)   09/08/21 98.7 °F (37.1 °C) (Temporal)   07/18/20 97.8 °F (36.6 °C) (Temporal)     BP Readings from Last 3 Encounters:   09/12/21 132/62   09/08/21 (!) 147/59   07/18/20 (!) 163/53     Pulse Readings from Last 3 Encounters:   09/12/21 77   09/08/21 67   07/18/20 55       General appearance: Somewhat frail-appearing female she is in no distress, she is slow to answer somewhat confused but calm and cooperative  Skin: Color, texture, turgor normal. No rashes or lesions. Head: Normocephalic. No masses, lesions, tenderness or abnormalities   Face: Symmetric no visible lesions. Eyes: Conjunctivae/cornea clear. Her pupils are quite myotic and do not react to light  Neck:  Symmetric. No adenopathy. Lungs: Clear to auscultation. No rhonchi, crackles or rales. Heart: S1 > S2. Rhythm is regular and rate is normal. No gallop rub or murmur. Abdomen: Soft, mildly protuberant, non-tender. BS normal. No masses, organomegaly. Anatomic contours appear normal.  Extremities: No deformities, edema, or skin discoloration. Peripheral perfusion assessed in all exremities. No cyanosis  Musculoskeletal: She expresses some pain with movement of her right arm specifically located at the elbow to the shoulder  Neuro:   · Cranial nerves grossly intact.    · Motor: Strength clearly has a deficit of the right arm lack of movement and flaccid  · Sensory: Grossly no deficit cannot adequately assess  · Cerebellar testing was abnormal  Mental status: She is awake slow to respond confused and incapable of self determination  Mood: Flat  Gait & balance: not assessed:     Labs     CBC:   Lab Results   Component Value Date    WBC 10.5 09/11/2021    RBC 3.10 09/11/2021    HGB 9.6 09/11/2021    HCT 29.4 09/11/2021     09/11/2021    MCV 94.8 09/11/2021     BMP:    Lab Results   Component Value Date     09/12/2021    K 3.6 09/12/2021     09/12/2021    CO2 29 09/12/2021    BUN 13 09/12/2021    CREATININE 0.8 09/12/2021    GLUCOSE 84 09/12/2021    CALCIUM 8.9 09/12/2021     Hepatic Function Panel:    Lab Results   Component Value Date    ALKPHOS 150 09/10/2021    AST 24 09/10/2021    ALT 26 09/10/2021    PROT 6.7 09/10/2021    LABALBU 3.6 09/10/2021    BILITOT 0.5 09/10/2021     Magnesium:    Lab Results   Component Value Date    MG 2.1 09/11/2021     Cardiac Enzymes:   Lab Results   Component Value Date    TROPONINI <0.01 01/27/2020     LDH:  No results found for: LDH  PT/INR:    Lab Results   Component Value Date    PROTIME 11.2 01/27/2020    INR 1.0 01/27/2020       Lipid Profile:   Lab Results   Component Value Date    TRIG 123 09/06/2021    HDL 44 09/06/2021    LDLCALC 97 09/06/2021    CHOL 166 09/06/2021      Hemoglobin A1C: No components found for: HGBA1C   U/A:   Lab Results   Component Value Date    LEUKOCYTESUR TRACE 09/10/2021    PHUR 5.5 09/10/2021    WBCUA 2-5 09/10/2021    RBCUA 1-3 09/10/2021    BACTERIA MANY 09/10/2021    SPECGRAV >=1.030 09/10/2021    BLOODU MODERATE 09/10/2021    GLUCOSEU Negative 09/10/2021         ADMISSION SCHEDULED MEDS:   Current Facility-Administered Medications   Medication Dose Route Frequency Provider Last Rate Last Admin    aspirin EC tablet 81 mg  81 mg Oral Daily NOEMI Harrison   81 mg at 09/12/21 0903    diphenhydrAMINE (BENADRYL) tablet 25 mg  25 mg Oral Nightly PRN NOEMI Stevens        divalproex (DEPAKOTE) DR tablet 250 mg  250 mg Oral TID NOEMI Stevens   250 mg at 09/12/21 0903    donepezil (ARICEPT) tablet 10 mg  10 mg Oral Nightly NOEMI Stevens   10 mg at 09/11/21 2106    ibuprofen (ADVIL;MOTRIN) tablet 600 mg  600 mg Oral Q6H PRN NOEMI Stevens  mirtazapine (REMERON) tablet 15 mg  15 mg Oral Nightly Markus Lenz PA   15 mg at 09/11/21 2106    sodium chloride flush 0.9 % injection 10 mL  10 mL IntraVENous 2 times per day Markus Lenz PA   10 mL at 09/12/21 2081    sodium chloride flush 0.9 % injection 10 mL  10 mL IntraVENous PRN NOEMI Dao        0.9 % sodium chloride infusion  25 mL IntraVENous PRN Markus Lenz  mL/hr at 09/11/21 0600 25 mL at 09/11/21 0600    potassium chloride (KLOR-CON M) extended release tablet 40 mEq  40 mEq Oral PRN NOEMI Dao        Or    potassium bicarb-citric acid (EFFER-K) effervescent tablet 40 mEq  40 mEq Oral PRN Markus Lenz PA   40 mEq at 09/11/21 1628    Or    potassium chloride 10 mEq/100 mL IVPB (Peripheral Line)  10 mEq IntraVENous PRN NOEMI Dao        enoxaparin (LOVENOX) injection 40 mg  40 mg SubCUTAneous Daily NOEMI Dao   40 mg at 09/12/21 0903    magnesium hydroxide (MILK OF MAGNESIA) 400 MG/5ML suspension 30 mL  30 mL Oral Daily PRN NOEMI Dao        acetaminophen (TYLENOL) tablet 650 mg  650 mg Oral Q6H PRN Markus Lenz PA   650 mg at 09/12/21 0902    Or    acetaminophen (TYLENOL) suppository 650 mg  650 mg Rectal Q6H PRN NOEMI Dao        cefTRIAXone (ROCEPHIN) 1,000 mg in sterile water 10 mL IV syringe  1,000 mg IntraVENous Q24H NOEMI Dao   1,000 mg at 09/11/21 2347    hydrALAZINE (APRESOLINE) injection 10 mg  10 mg IntraVENous Q6H PRN Markus Lenz PA   10 mg at 09/11/21 2353    trimethobenzamide (TIGAN) injection 200 mg  200 mg IntraMUSCular Q6H PRN NOEMI Dao        cetirizine (ZYRTEC) tablet 10 mg  10 mg Oral Daily Alena Gitelman, MD   10 mg at 09/12/21 0902    lactated ringers infusion   IntraVENous Continuous Han Ellis MD 75 mL/hr at 09/12/21 0047 New Bag at 09/12/21 0047       Current  Infusions   sodium chloride 25 mL (09/11/21 0600)    lactated ringers 75 mL/hr at 09/12/21 urine was a straight cath specimen to determine if this is truly a urinary tract infection however it does not appear to be:  The resulted urine is a clean-catch which is not possible in a patient with this cognitive status  The urine collection still has not been completed    Continue antibiotics until blood cultures and urine cultures are available  Hydration currently appears to be adequate  CT scan shows no change  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

## 2021-09-12 NOTE — PLAN OF CARE
Problem: Falls - Risk of:  Goal: Will remain free from falls  Description: Will remain free from falls  9/12/2021 0203 by Simeon Villalpando  Outcome: Met This Shift  9/11/2021 1749 by Merrick Barajas RN  Outcome: Ongoing  Goal: Absence of physical injury  Description: Absence of physical injury  9/12/2021 0203 by Simeon Villalpando  Outcome: Met This Shift  9/11/2021 1749 by Merrick Barajas RN  Outcome: Ongoing     Problem: Skin Integrity:  Goal: Will show no infection signs and symptoms  Description: Will show no infection signs and symptoms  9/11/2021 1749 by Merrick Barajas RN  Outcome: Ongoing  Goal: Absence of new skin breakdown  Description: Absence of new skin breakdown  9/11/2021 1749 by Merrick Barajas RN  Outcome: Ongoing

## 2021-09-13 VITALS
TEMPERATURE: 97.6 F | WEIGHT: 168.19 LBS | SYSTOLIC BLOOD PRESSURE: 147 MMHG | HEIGHT: 63 IN | RESPIRATION RATE: 16 BRPM | DIASTOLIC BLOOD PRESSURE: 64 MMHG | HEART RATE: 69 BPM | OXYGEN SATURATION: 95 % | BODY MASS INDEX: 29.8 KG/M2

## 2021-09-13 LAB
ANION GAP SERPL CALCULATED.3IONS-SCNC: 12 MMOL/L (ref 7–16)
BUN BLDV-MCNC: 14 MG/DL (ref 6–23)
CALCIUM SERPL-MCNC: 9 MG/DL (ref 8.6–10.2)
CHLORIDE BLD-SCNC: 106 MMOL/L (ref 98–107)
CO2: 23 MMOL/L (ref 22–29)
CREAT SERPL-MCNC: 0.8 MG/DL (ref 0.5–1)
EKG ATRIAL RATE: 81 BPM
EKG P AXIS: 11 DEGREES
EKG P-R INTERVAL: 140 MS
EKG Q-T INTERVAL: 380 MS
EKG QRS DURATION: 104 MS
EKG QTC CALCULATION (BAZETT): 441 MS
EKG R AXIS: -30 DEGREES
EKG T AXIS: 27 DEGREES
EKG VENTRICULAR RATE: 81 BPM
GFR AFRICAN AMERICAN: >60
GFR NON-AFRICAN AMERICAN: >60 ML/MIN/1.73
GLUCOSE BLD-MCNC: 75 MG/DL (ref 74–99)
POTASSIUM REFLEX MAGNESIUM: 4 MMOL/L (ref 3.5–5)
SARS-COV-2, NAAT: NOT DETECTED
SODIUM BLD-SCNC: 141 MMOL/L (ref 132–146)

## 2021-09-13 PROCEDURE — 97530 THERAPEUTIC ACTIVITIES: CPT

## 2021-09-13 PROCEDURE — 2580000003 HC RX 258: Performed by: PHYSICIAN ASSISTANT

## 2021-09-13 PROCEDURE — 80048 BASIC METABOLIC PNL TOTAL CA: CPT

## 2021-09-13 PROCEDURE — G0378 HOSPITAL OBSERVATION PER HR: HCPCS

## 2021-09-13 PROCEDURE — 36415 COLL VENOUS BLD VENIPUNCTURE: CPT

## 2021-09-13 PROCEDURE — 6360000002 HC RX W HCPCS: Performed by: PHYSICIAN ASSISTANT

## 2021-09-13 PROCEDURE — 6370000000 HC RX 637 (ALT 250 FOR IP): Performed by: INTERNAL MEDICINE

## 2021-09-13 PROCEDURE — 6370000000 HC RX 637 (ALT 250 FOR IP): Performed by: PHYSICIAN ASSISTANT

## 2021-09-13 PROCEDURE — 96372 THER/PROPH/DIAG INJ SC/IM: CPT

## 2021-09-13 PROCEDURE — 97161 PT EVAL LOW COMPLEX 20 MIN: CPT

## 2021-09-13 PROCEDURE — 97535 SELF CARE MNGMENT TRAINING: CPT

## 2021-09-13 PROCEDURE — 97166 OT EVAL MOD COMPLEX 45 MIN: CPT

## 2021-09-13 PROCEDURE — 87635 SARS-COV-2 COVID-19 AMP PRB: CPT

## 2021-09-13 RX ORDER — SULFAMETHOXAZOLE AND TRIMETHOPRIM 800; 160 MG/1; MG/1
1 TABLET ORAL EVERY 12 HOURS SCHEDULED
Qty: 20 TABLET | Refills: 0 | DISCHARGE
Start: 2021-09-13 | End: 2021-09-23

## 2021-09-13 RX ORDER — BISACODYL 10 MG
10 SUPPOSITORY, RECTAL RECTAL DAILY PRN
COMMUNITY

## 2021-09-13 RX ORDER — SODIUM PHOSPHATE, DIBASIC AND SODIUM PHOSPHATE, MONOBASIC 7; 19 G/133ML; G/133ML
1 ENEMA RECTAL DAILY PRN
COMMUNITY

## 2021-09-13 RX ADMIN — SULFAMETHOXAZOLE AND TRIMETHOPRIM 1 TABLET: 800; 160 TABLET ORAL at 08:10

## 2021-09-13 RX ADMIN — SODIUM CHLORIDE, PRESERVATIVE FREE 10 ML: 5 INJECTION INTRAVENOUS at 08:09

## 2021-09-13 RX ADMIN — DONEPEZIL HYDROCHLORIDE 10 MG: 5 TABLET, FILM COATED ORAL at 20:34

## 2021-09-13 RX ADMIN — MIRTAZAPINE 15 MG: 15 TABLET, FILM COATED ORAL at 20:34

## 2021-09-13 RX ADMIN — ENOXAPARIN SODIUM 40 MG: 40 INJECTION SUBCUTANEOUS at 08:09

## 2021-09-13 RX ADMIN — ASPIRIN 81 MG: 81 TABLET, COATED ORAL at 08:08

## 2021-09-13 RX ADMIN — CETIRIZINE HYDROCHLORIDE 10 MG: 10 TABLET, FILM COATED ORAL at 08:10

## 2021-09-13 RX ADMIN — ACETAMINOPHEN 650 MG: 325 TABLET ORAL at 08:24

## 2021-09-13 RX ADMIN — DIVALPROEX SODIUM 250 MG: 250 TABLET, DELAYED RELEASE ORAL at 08:10

## 2021-09-13 RX ADMIN — DIVALPROEX SODIUM 250 MG: 250 TABLET, DELAYED RELEASE ORAL at 15:54

## 2021-09-13 RX ADMIN — SULFAMETHOXAZOLE AND TRIMETHOPRIM 1 TABLET: 800; 160 TABLET ORAL at 20:34

## 2021-09-13 ASSESSMENT — PAIN SCALES - GENERAL
PAINLEVEL_OUTOF10: 0
PAINLEVEL_OUTOF10: 0
PAINLEVEL_OUTOF10: 6

## 2021-09-13 NOTE — CARE COORDINATION
Awaiting OT initial eval; discharge transportation set-up for 7:30 PM; updated Kennedi Dominguez, and charge RN Jefe Borja of the aforementioned; pt not to leave until OT's note is in epic.

## 2021-09-13 NOTE — PROGRESS NOTES
CLINICAL PHARMACY: DISCHARGE MED RECONCILIATION/REVIEW    [employer] Patient?: No  Total # of Interventions Recommended: 0   -   Total # Interventions Accepted: 0  Intervention Severity:   - Level 1 Intervention Present?: No   - Level 2 #: 0   - Level 3 #: 0   Time Spent (min): 15    Additional Documentation: See progress note.

## 2021-09-13 NOTE — PROGRESS NOTES
Nurse to nurse called to [facility] at [facility], spoke to [facility]. Awaiting transport. Electronically signed by Jayda Meza RN on 9/13/2021 at 7:37 PM

## 2021-09-13 NOTE — CARE COORDINATION
Spoke with Ariadne Swartz pt can return once therapy notes are in; called stat therapy to  and relayed; discharge order noted; rapid covid provided to bedside RN. Left detailed VM for pt's niece Jacek Guillen to verified discharge plan to return to [facility] at the [place], awaiting call back. Angelica, called back and verified discharge plan is to return to [facility] at the [place] until pt is stronger and can return to [address] [facility]. Envelope and ambulance form completed in soft-chart.

## 2021-09-13 NOTE — PROGRESS NOTES
[facility]   Department of Pharmacy   Pharmacist Transition of Care Services         Patient Demographics  Name:  Kenia Quiñonez Record Number:  [id number]  Gender:  female   Age:  79 y.o. YOB: 1940    Readmission Risk: 10%       Pharmacist Review and Summary of Medications     Date of last reviewed/update: 9/13/21     Category Comments   New Medication Started   Bactrim 800-160 mg q12h x 10 days     Change in Outpatient Medication      Discontinued/On hold Outpatient Medication  Ibuprofen  Diphenhydramine  Loratadine   Other          Documentation of Pharmacist Interventions and Follow-up Plan:     The following Pharmacist Transition of Care Services were completed:   [x]  Reviewed and summarized medication changes  [x]  Entire home medication list was reviewed for accuracy  [x]  Home medication list was updated or corrected    [x]  Reviewed discharge medication reconciliation  []  Discharge medication list was updated or corrected    []  Added information to AVS   []  Patient education was provided on new medications  []  Patient education was provided on medication changes  []  Reviewed the AVS with patient    Additional Interventions:  []  Inpatient prescriber was contacted and the following pharmacy recommendations        were accepted: **     [] Other interventions: **        Pharmacist: J CARLOS Bone [facility] PharmD, [facility]  Date:  9/13/2021 3:05 PM

## 2021-09-13 NOTE — PLAN OF CARE
Problem: Falls - Risk of:  Goal: Will remain free from falls  Description: Will remain free from falls  9/13/2021 0046 by Adrienne Charles  Outcome: Met This Shift  9/12/2021 1439 by Juancarlos Shah RN  Outcome: Met This Shift  Goal: Absence of physical injury  Description: Absence of physical injury  9/13/2021 0046 by Adrienne Charles  Outcome: Met This Shift  9/12/2021 1439 by Juancarlos Shah RN  Outcome: Met This Shift     Problem: Urinary Elimination:  Goal: Signs and symptoms of infection will decrease  Description: Signs and symptoms of infection will decrease  9/13/2021 0046 by Adrienne Charles  Outcome: Met This Shift  9/12/2021 1439 by Juancarlos Shah RN  Outcome: Met This Shift

## 2021-09-13 NOTE — PROGRESS NOTES
Patient movement to right arm severely limited. Unable to reach, feed self, or hold upright. Patient voiced pain intermittent , positioned for comfort. Call out to update attending, left voice mail with call back number. Patient able to still drink/ eat, facial droop negative, and right leg and foot movement moderate. Electronically signed by Cortes Byrnes RN on 9/13/2021 at 1:15 PM

## 2021-09-13 NOTE — PROGRESS NOTES
Physical Therapy Initial Assessment     Name: Kenroy Treadwell  : 1940  MRN: [id number]      Date of Service: 2021    Evaluating PT:  Tatum Dudley, PT, DPT [id number]      Room #:  7034/5220-N  Diagnosis:  UTI (urinary tract infection), bacterial [N39.0, A49.9]  Sepsis ([facility] [A41.9]  Altered mental status, unspecified altered mental status type [R41.82]  Sepsis, due to unspecified organism, unspecified whether acute organ dysfunction present ([facility] [A41.9]  PMHx/PSHx:  None documented  Procedure/Surgery:  NA  Precautions:  Falls, Confusion, Inattention to right side  Equipment Needs:  Has [employer]    SUBJECTIVE:    Pt admitted from a [facility]. Pt discharged  and returned to ED 9/10 with AMS. Pt questionable historian. OBJECTIVE:   Initial Evaluation  Date: 2021 Treatment Date:  NA Short Term/ Long Term   Goals   AM-PAC 6 Clicks      Was pt agreeable to Eval/treatment? Yes      Does pt have pain? Reported pain in left side of her head/neck, as well as RUE with mobility. Did not quantify. Bed Mobility  Rolling: Min A  Supine to sit: Mod A  Sit to supine: Mod A  Scooting: Mod A  Rolling: Min A  Supine to sit: Min A  Sit to supine: Min A  Scooting: Min A   Transfers Sit to stand: Mod A  Stand to sit: Mod A  Stand pivot: NT  Sit to stand: Min A  Stand to sit: Min A  Stand pivot: Min A   Ambulation    2-3 steps with no AD with Max A  >50 feet with [employer] with Mod A   Stair negotiation: ascended and descended  NT  4 steps with 1 rail with Mod A   ROM BUE:  RUE limited by pain, LUE WFL  BLE:  WFL     Strength BUE:  RUE 2-/5, LUE 3+/5  BLE:  RLE 3-/5, LLE 3+/5  Increase strength 1/3 grade. Balance Sitting EOB:  Min A  Dynamic Standing:  Mod/Max A  Sitting EOB:  SBA  Dynamic Standing:  Min/Mod A with WW     Pt is A & O x 1, self only.   Sensation:  Pt denies numbness and tingling to extremities  Edema:  None noted    Vitals:  Blood Pressure at rest - Blood Pressure post session -   Heart Rate at rest - Heart Rate post session -   SPO2 at rest - SPO2 post session -     Therapeutic Exercises:  NT    Patient education  Pt educated on purpose of PT assessment, importance of mobility, safety with mobility, transfers, gait, use of AD for safety    Patient response to education:   Pt verbalized understanding Pt demonstrated skill Pt requires further education in this area   Yes  Yes with verbal cues and assist Yes/Reinforcement     ASSESSMENT:    Conditions Requiring Skilled Therapeutic Intervention:     [x]Decreased strength     [x]Decreased ROM  [x]Decreased functional mobility  [x]Decreased balance   [x]Decreased endurance   []Decreased posture  []Decreased sensation  []Decreased coordination   []Decreased vision  [x]Decreased safety awareness   [x]Increased pain       Comments:  Patient cleared by RN and agreeable to treatment. Patient found in semi Myers's and reported being cold. Patient recently discharged from this hospital, but presents with right side weakness and inattention this date. Patient assisted to seated EOB and unable to use RUE to assist with task. Patient reported mild dizziness with positional change. Patient with poor sitting balance and hands on maintained for safety. Patient not able to grasp walker to assist with transfers or gait and performed task without AD. Patient moderately unsteady on her feet and only able to perform a few short side steps and returned to seated. Patient assisted back to supine with call light and tray table in reach. Patient's RN updated on right weakness findings. Treatment:  Patient practiced and was instructed in the following treatment:    · Bed mobility: Verbal/tactile cues for sequencing BUEs/BLEs for safe technique with rolling/supine<>sit task. · Transfer training: Verbal/tactile cues to facilitate proper hand placement, technique and safety during sit to stand task.    · Gait training: Verbal and tactile cues to facilitate upright posture and safety as well as Evaluation Time includes thorough review of current medical information, gathering information on past medical history/social history and prior level of function, completion of standardized testing/informal observation of tasks, assessment of data and education on plan of care and goals.     CPT codes:  [x] Low Complexity PT evaluation 26680  [] Moderate Complexity PT evaluation 91570  [] High Complexity PT evaluation 06759  [] PT Re-evaluation 40732  [] Gait training 70295 - minutes  [] Manual therapy 20175 - minutes  [x] Therapeutic activities 54209 16 minutes  [] Therapeutic exercises 59966 - minutes  [] Neuromuscular reeducation 83881 - minutes     Neena Rodriguez, PT, DPT  License [id number]

## 2021-09-13 NOTE — DISCHARGE INSTR - COC
Continuity of Care Form    Patient Name: Elli Mclaughlin   :  1940  MRN:  [id number]    Admit date:  9/10/2021  Discharge date:  21    Code Status Order: Full Code   Advance Directives:      Admitting Physician:  Cristiana Naranjo MD  PCP: Paz Carrion III, DO    Discharging Nurse: Irwin Coffman Unit/Room#: 3839/1181-L  Discharging Unit Phone Number: [phone]    Emergency Contact:   Extended Emergency Contact Information  Primary Emergency Contact: roscoe mccabe  Home Phone: [phone]  Mobile Phone: [phone]  Relation: Brother/Sister  Preferred language: English   needed? No  Secondary Emergency Contact: dayan desir  Mobile Phone: [phone]  Relation: Niece/Nephew  Preferred language: English   needed? No    Past Surgical History:  History reviewed. No pertinent surgical history.     Immunization History:   Immunization History   Administered Date(s) Administered    Tdap (Boostrix, Adacel) 2020       Active Problems:  Patient Active Problem List   Diagnosis Code    Essential hypertension I10    Acute colitis K52.9    UTI (urinary tract infection) N39.0    Nausea and vomiting R11.2    Depression F32.9    Dementia ([address] F03.90    Cholelithiases K80.20    HTN (hypertension) I10    Leukocytosis D72.829    Shingles T22.4    Biliary colic H97.90    Sepsis ([facility] A41.9       Isolation/Infection:   Isolation            No Isolation          Patient Infection Status       None to display            Nurse Assessment:  Last Vital Signs: BP (!) 150/77   Pulse 66   Temp 97.6 °F (36.4 °C) (Oral)   Resp 18   Ht 5' 3\" (1.6 m)   Wt 168 lb 3 oz (76.3 kg)   SpO2 95%   BMI 29.79 kg/m²     Last documented pain score (0-10 scale): Pain Level: 6  Last Weight:   Wt Readings from Last 1 Encounters:   21 168 lb 3 oz (76.3 kg)     Mental Status:  disoriented    IV Access:  - None    Nursing Mobility/ADLs:  Walking   Assisted  Transfer Assisted  Bathing  Dependent  Dressing  Dependent  Toileting  Dependent  Feeding  Dependent  Med Admin  Independent  Med Delivery   whole    Wound Care Documentation and Therapy:        Elimination:  Continence:   · Bowel: No  · Bladder: No  Urinary Catheter: None   Colostomy/Ileostomy/Ileal Conduit: No       Date of Last BM: ***    Intake/Output Summary (Last 24 hours) at 9/13/2021 1048  Last data filed at 9/13/2021 0809  Gross per 24 hour   Intake 250 ml   Output 550 ml   Net -300 ml     I/O last 3 completed shifts: In: 240 [P.O.:240]  Out: 550 [Urine:550]    Safety Concerns:     Sundowners [place] and At Risk for Falls    Impairments/Disabilities:      None    Nutrition Therapy:  Current Nutrition Therapy:   - Oral Diet:  General    Routes of Feeding: Oral  Liquids: Thin Liquids  Daily Fluid Restriction: no  Last Modified Barium Swallow with Video (Video Swallowing Test): not done    Treatments at the Time of Hospital Discharge:   Respiratory Treatments: ***  Oxygen Therapy:  is not on home oxygen therapy.   Ventilator:    - No ventilator support    Rehab Therapies: Physical Therapy, Occupational Therapy and Speech/Language Therapy  Weight Bearing Status/Restrictions: No weight bearing restirctions  Other Medical Equipment (for information only, NOT a DME order):  wheelchair and walker  Other Treatments: ***    Patient's personal belongings (please select all that are sent with patient):  None    RN SIGNATURE:  Electronically signed by Deidre Sanchez RN on 9/13/21 at 5:50 PM EDT    CASE MANAGEMENT/SOCIAL WORK SECTION    Inpatient Status Date: ***    Readmission Risk Assessment Score:  Readmission Risk              Risk of Unplanned Readmission:  10           Discharging to Facility/ Agency   · Name:   · Address:  · Phone:  · Fax:    Dialysis Facility (if applicable)   · Name:  · Address:  · Dialysis Schedule:  · Phone:  · Fax:    / signature: {Esignature:[id number]:::0}    PHYSICIAN SECTION    Prognosis: {Prognosis:2821196427:::0}    Condition at Discharge: Lewis Moran Patient Condition:176827934:::0}    Rehab Potential (if transferring to Rehab): {Prognosis:1834507863:::0}    Recommended Labs or Other Treatments After Discharge: ***    Physician Certification: I certify the above information and transfer of Awa Verdugo  is necessary for the continuing treatment of the diagnosis listed and that she requires {Admit to Appropriate Level of Care:68829:::0} for {GREATER/LESS:523091790} 30 days.      Update Admission H&P: {CHP DME Changes in HandP:589689543:::0}    PHYSICIAN SIGNATURE:  Electronically signed by Micky Baum MD on 9/13/21 at 10:49 AM EDT

## 2021-09-13 NOTE — PROGRESS NOTES
[address]      Date:2021                                                  Patient Name: Tomer Goodman  MRN: [id number]  : 1940  Room: [address] [place] [address]    Evaluating OT: KOKO Robledo, OTR/L  # [id number]    Referring Provider:  NOEMI Jara  Specific Provider Orders:  Gualberto Fairly and Treat\"21    Modified Katelyn Scale (MRS)  Score     Description  0             No symptoms  1             No significant disability despite symptoms  2             Slight disability; able to look after own affairs  3             Moderate disability; able to ambulate without assist/ requires assist with ADLs  4             Moderate/Severe disability;requires assist to ambulate/assist with ADLs  5             Severe disability;bedridden/incontinent   6               Score:   4      Diagnosis: UTI (urinary tract infection), bacterial [N39.0, A49.9]  Sepsis ([facility] [A41.9]  Altered mental status, unspecified altered mental status type [R41.82]  Sepsis, due to unspecified organism, unspecified whether acute organ dysfunction present ([facility] [A41.9]    Pt was transferred from SNF d/t abnormal labs, lethargy, altered mental status    Pertinent Medical History:  Pt has no past medical history on file. ,  has no past surgical history on file.     Surgeries this admission: none     Precautions:  Fall Risk  Right Hemiplegia    Assessment of current deficits   [x] Functional mobility   [x]ADLs  [x] Strength               [x]Cognition   [x] Functional transfers   [x] IADLs         [x] Safety Awareness   [x]Endurance   [x] Fine Coordination              [x] Balance      [] Vision/perception   [x]Sensation    [x]Gross Motor Coordination  [x] ROM  [] Delirium                   [x] Motor Control       OT PLAN OF CARE   OT POC based on physician orders, patient diagnosis and results of clinical assessment    Frequency/Duration 1-3 days/wk for 2 weeks PRN   Specific OT Treatment to include:   * Instruction/training on adapted ADL techniques and AE recommendations to increase functional independence within precautions       * Training on energy conservation strategies, correct breathing pattern and techniques to improve independence/tolerance for self-care routine  * Functional transfer/mobility training/DME recommendations for increased independence, safety, and fall prevention  * Patient/Family education to increase follow through with safety techniques and functional independence  * Recommendation of environmental modifications for increased safety with functional transfers/mobility and ADLs  * Cognitive retraining/development of therapeutic activities to improve problem solving, judgement, memory, and attention for increased safety/participation in ADL/IADL tasks  * Sensory re-education to improve body/limb awareness, maintain/improve skin integrity, and improve hand/UE motor function  * Visual-perceptual training to improve environmental scanning, visual attention/focus, and oculomotor skills for increased safety/independence with functional transfers/mobility and ADLs  * Splinting/positioning for increased function, prevention of contractures, and improve skin integrity  * Therapeutic exercise to improve motor endurance, ROM, and functional strength for ADLs/functional transfers  * Therapeutic activities to facilitate/challenge dynamic balance, stand tolerance for increased safety and independence with ADLs  * Therapeutic activities to facilitate gross/fine motor skills for increased independence with ADLs  * Neuro-muscular re-education: facilitation of righting/equilibrium reactions, midline orientation, scapular stability/mobility, normalization of muscle tone, and facilitation of volitional active controled movement  * Positioning to improve skin integrity, interaction with environment and functional independence  * Delirium prevention/treatment  * Manual techniques for edema management  Other:    Recommended Adaptive Equipment: TBD as pt progresses       Home Living:  Per chart, earlier this month, pt was living with her Sister in a 2-story house. Bed/bath on 2nd floor. ?? Basement. Bathroom setup:  Tub-Shower, Standard-height Commode   Equipment owned:  [employer]    Available Family Assist:  Unlknown    Prior Level of Function:  Prior to recent admission earlier this month, pt was IND with all ADLs, IADLs, Transfers and Mobility using No AD for ambulation. Active  Driving:  Yes  Occupation:  None known    Pain Level:  8/10 Left side of Neck w/ movement;  Relief w/ Rest and Repositioning, Nsg Notified   Additional Complaints:  New Right Ancelmo-paresis - Reported to RN    Cognition: A & O x 2 - Pt was able to state her full name, Place and general situation. Id'd current month as \"October\" able to ID current year w/ Min VCs given 2 choices   Able to Follow Multi-Step Commands w/ Min-mod VCs   Memory:  fair    Sequencing:  fair    Problem solving:  fair    Judgement/safety:  fair   Additional Comments:  Pt was pleasant and cooperative.   Focused on neck pain    Vitals/Lab Values:  WFL Room Air        Functional Assessment:  AM-PAC Daily Activity Raw Score: 10/24     Initial Eval Status  Date: 9/13/21   Treatment Status  Date: STGs = LTGs  Time frame: 10-14 days   Feeding Min A/Set up    Pt was Not able to utilize her Dominant Right UE d/t Ancelmo-paresis  Able to feed self w/ Min A after good set up w/ use of Left Non-dominant UE  High-gamble position    SUP/Set up   Grooming Mod A    Unable to use dominant R UE d/t hemiparesis  Max A to bring R UE to midline, Max A to wash hands w/ cloth  Able to wash face, comb hair w/ non-dominant Left UE w/ Min VCs - in high gamble position    Unsafe to attempt task seated EOB d/t poor sitting balance    Min A  Seated   UB Dressing Max A    Max A to thread R UE into/out of gown, Min A L UE, Max A to wrap garment around back - in high gamble position  Unsafe to complete task seated EOB d/t poor sitting balance     Mod A     LB Dressing Dep    Max A to don socks  Max A of 1 for standing balance + Max A of 1 for simulated clothing adjustment over hips  Pt ed for safe/adaptive techs, use of adaptive equip    Max A     Bathing NT      Max A      Toileting Dep    Max A of 1 for standing balance + max A of 1 for simulated hygiene/clothing adjustment over hips    Max A     Bed Mobility  Supine to sit: Max A   Sit to supine:  Max A     Supine to sit: Mod A  Sit to supine: Mod A     Functional Transfers Max A    EOB  Pt ed for safety/hand placement    Mod A     Functional Mobility DEP    Unable to take a single step along EOB  Max A to scoot along EOB for improved positioning    Max A     Balance Sitting:     Static:  Mod-max A EOB    Dynamic:  Max A w/ functional ax      Standing:     Static:  Max A    Dynamic:  Max A w/ functional ax/mobility        Activity Tolerance Poor(+)      Fair   Visual/  Perceptual    Hearing: WNL - able to scan/track across midline  Glasses: None    WFL   Hearing Aids:  No               Hand Dominance: Right   AROM Strength Additional Info:    RUE  Unable to lift UE up against gravity from surface of bed  [facility] 2-/5 distally  1/5 proximally Poor ;   Poor FMC/dexterity noted during ADL tasks     LUE WFL 4-/5 Good ;    Good FMC/dexterity noted during ADL tasks       ** Of note, OT Eval 9-8-21 indicated R UE Good AROM throughout, 3+/5 strength, Good /Fine motor skills    Sensation:  difficult to assess d/t pt's cognition  Tone: Near Flaccid R UE, WNL L UE   Edema: Mild Edema throughout R UE, None Noted L UE    Comments: Upon arrival, patient was found in semi-supine feeding self w/ L UE - not attempting to feed self w/ R UE despite encouragement. She was agreeable to participate in therapeutic ax. No Family present during session.   Received permission from RN prior to engaging pt in OT services. At the end of the session, patient was properly positioned in Semi-Supine, R UE elevated for safety, edema control. Call light and phone within reach, all lines and tubes intact. Oriented pt to call bell. Made all appropriate Environmental Modifications to facilitate pt's level of IND and safety. All needs met. Bed Alarm activated. Overall patient demonstrated decreased independence and safety during completion of ADL/functional transfer/mobility tasks. Pt would benefit from continued skilled OT to increase safety and independence with completion of ADL/IADL tasks for functional independence and quality of life. Treatment: OT treatment provided this date includes:    Instruction/training on safety and adapted techniques for completion of ADLs, use of DME/AD/Adaptive equip:     Instruction/training on safe functional mobility/transfer techniques, use of DME/AD:     Instruction/training on energy conservation techs (EC)/Pursed-Lip Breathing (PLB)/work simplification for completion of ADLs:      Neuromuscular Reeducation to facilitate balance/righting reactions for increased function with ADLs:     Skilled positioning/alignment for Pain Mgmt, Skin Integrity, Edema Control, to maximize Pt's safety and ability to [facility] interact w/ his/her environment   Activity tolerance - Sitting/Standing to improve endurance w/ functional ax    Cognitive retraining -  Oriented pt to current Date, Place and Situation; Cues for safety/safety awareness, sequencing, problem solving     Skilled monitoring of pt's response to tx ax     Therapeutic Exercises- Instruction on BUE exercises/ROM to improve strength and function of BUE for improved indep with ADLs     Neuromuscular Facilitation of  UE functional movement/ROM/fine motor dexterity     Facilitation of Visual Perceptual Skills for increased safety and independence with ADLs.        Extensively Consulted RN, PT re: Pt's R UE weakness     Made all appropriate Environmental Modifications to facilitate pt's level of IND and safety.  Recommendations for Continued Participation in OT services during Hospitalization and at D/C - SNF    Pt and/or Family verbalized/demonstrated a Fair understanding of education provided. Will Review PRN. Rehab Potential: Fair for established goals     Patient / Family Goal: Not stated at this time      Patient and/or family were instructed on functional diagnosis, prognosis/goals and OT plan of care. Demonstrated Fair understanding. Eval Complexity: Mod    Time In: 1225  Time Out: 1305  Total Treatment Time: 25 minutes    Min Units   OT Eval Low 97165  X  1   OT Eval Medium 97613      OT Eval High 19219      OT Re-Eval [id number]       Therapeutic Ex 73368       Therapeutic Activities 78406  10  1   ADL/Self Care 28889  15  1   Orthotic Management 93904       Manual [id number]     Neuro Re-Ed [id number]       Non-Billable Time              Evaluation Time additionally includes thorough review of current medical information, gathering information on past medical history/social history and prior level of function, completion of standardized testing/informal observation of tasks, assessment of data and education on plan of care and goals.             KOKO Malcolm, OTR/L  # [id number]

## 2021-09-15 LAB
BLOOD CULTURE, ROUTINE: NORMAL
CULTURE, BLOOD 2: NORMAL

## 2021-09-16 LAB — BLOOD CULTURE, ROUTINE: NORMAL

## 2021-10-05 ENCOUNTER — APPOINTMENT (OUTPATIENT)
Dept: CT IMAGING | Age: 81
End: 2021-10-05
Payer: MEDICARE

## 2021-10-05 ENCOUNTER — HOSPITAL ENCOUNTER (EMERGENCY)
Age: 81
Discharge: HOME | End: 2021-10-06
Attending: EMERGENCY MEDICINE
Payer: MEDICARE

## 2021-10-05 ENCOUNTER — APPOINTMENT (OUTPATIENT)
Dept: ULTRASOUND IMAGING | Age: 81
End: 2021-10-05
Payer: MEDICARE

## 2021-10-05 ENCOUNTER — APPOINTMENT (OUTPATIENT)
Dept: GENERAL RADIOLOGY | Age: 81
End: 2021-10-05
Payer: MEDICARE

## 2021-10-05 VITALS
DIASTOLIC BLOOD PRESSURE: 73 MMHG | RESPIRATION RATE: 14 BRPM | HEART RATE: 80 BPM | SYSTOLIC BLOOD PRESSURE: 163 MMHG | OXYGEN SATURATION: 91 % | TEMPERATURE: 98 F

## 2021-10-05 DIAGNOSIS — R60.0 EDEMA OF RIGHT UPPER EXTREMITY: Primary | ICD-10-CM

## 2021-10-05 DIAGNOSIS — R53.1 RIGHT SIDED WEAKNESS: ICD-10-CM

## 2021-10-05 LAB
ALBUMIN SERPL-MCNC: 4 G/DL (ref 3.5–5.2)
ALP BLD-CCNC: 88 U/L (ref 35–104)
ALT SERPL-CCNC: 9 U/L (ref 0–32)
ANION GAP SERPL CALCULATED.3IONS-SCNC: 14 MMOL/L (ref 7–16)
APTT: 29.2 SEC (ref 24.5–35.1)
AST SERPL-CCNC: 24 U/L (ref 0–31)
BASOPHILS ABSOLUTE: 0.04 E9/L (ref 0–0.2)
BASOPHILS RELATIVE PERCENT: 0.5 % (ref 0–2)
BILIRUB SERPL-MCNC: 0.3 MG/DL (ref 0–1.2)
BUN BLDV-MCNC: 22 MG/DL (ref 6–23)
CALCIUM SERPL-MCNC: 10 MG/DL (ref 8.6–10.2)
CHLORIDE BLD-SCNC: 102 MMOL/L (ref 98–107)
CHP ED QC CHECK: NORMAL
CO2: 25 MMOL/L (ref 22–29)
CREAT SERPL-MCNC: 0.8 MG/DL (ref 0.5–1)
EKG ATRIAL RATE: 77 BPM
EKG P AXIS: 33 DEGREES
EKG P-R INTERVAL: 150 MS
EKG Q-T INTERVAL: 396 MS
EKG QRS DURATION: 106 MS
EKG QTC CALCULATION (BAZETT): 448 MS
EKG R AXIS: -35 DEGREES
EKG T AXIS: 43 DEGREES
EKG VENTRICULAR RATE: 77 BPM
EOSINOPHILS ABSOLUTE: 0.06 E9/L (ref 0.05–0.5)
EOSINOPHILS RELATIVE PERCENT: 0.7 % (ref 0–6)
GFR AFRICAN AMERICAN: >60
GFR NON-AFRICAN AMERICAN: >60 ML/MIN/1.73
GLUCOSE BLD-MCNC: 123 MG/DL (ref 74–99)
GLUCOSE BLD-MCNC: 124 MG/DL
HCT VFR BLD CALC: 42.5 % (ref 34–48)
HEMOGLOBIN: 13.2 G/DL (ref 11.5–15.5)
IMMATURE GRANULOCYTES #: 0.02 E9/L
IMMATURE GRANULOCYTES %: 0.2 % (ref 0–5)
INR BLD: 1.1
LYMPHOCYTES ABSOLUTE: 1.97 E9/L (ref 1.5–4)
LYMPHOCYTES RELATIVE PERCENT: 23.9 % (ref 20–42)
MCH RBC QN AUTO: 30.5 PG (ref 26–35)
MCHC RBC AUTO-ENTMCNC: 31.1 % (ref 32–34.5)
MCV RBC AUTO: 98.2 FL (ref 80–99.9)
MONOCYTES ABSOLUTE: 0.79 E9/L (ref 0.1–0.95)
MONOCYTES RELATIVE PERCENT: 9.6 % (ref 2–12)
NEUTROPHILS ABSOLUTE: 5.35 E9/L (ref 1.8–7.3)
NEUTROPHILS RELATIVE PERCENT: 65.1 % (ref 43–80)
PDW BLD-RTO: 14 FL (ref 11.5–15)
PLATELET # BLD: 214 E9/L (ref 130–450)
PMV BLD AUTO: 10.2 FL (ref 7–12)
POTASSIUM REFLEX MAGNESIUM: 3.9 MMOL/L (ref 3.5–5)
PRO-BNP: 69 PG/ML (ref 0–450)
PROTHROMBIN TIME: 12.7 SEC (ref 9.3–12.4)
RBC # BLD: 4.33 E12/L (ref 3.5–5.5)
SODIUM BLD-SCNC: 141 MMOL/L (ref 132–146)
TOTAL PROTEIN: 7.4 G/DL (ref 6.4–8.3)
TROPONIN, HIGH SENSITIVITY: 10 NG/L (ref 0–9)
WBC # BLD: 8.2 E9/L (ref 4.5–11.5)

## 2021-10-05 PROCEDURE — 70450 CT HEAD/BRAIN W/O DYE: CPT

## 2021-10-05 PROCEDURE — 85730 THROMBOPLASTIN TIME PARTIAL: CPT

## 2021-10-05 PROCEDURE — 93971 EXTREMITY STUDY: CPT

## 2021-10-05 PROCEDURE — 36415 COLL VENOUS BLD VENIPUNCTURE: CPT

## 2021-10-05 PROCEDURE — 80053 COMPREHEN METABOLIC PANEL: CPT

## 2021-10-05 PROCEDURE — 85610 PROTHROMBIN TIME: CPT

## 2021-10-05 PROCEDURE — 93010 ELECTROCARDIOGRAM REPORT: CPT | Performed by: INTERNAL MEDICINE

## 2021-10-05 PROCEDURE — 85025 COMPLETE CBC W/AUTO DIFF WBC: CPT

## 2021-10-05 PROCEDURE — 0042T CT BRAIN PERFUSION: CPT

## 2021-10-05 PROCEDURE — 70498 CT ANGIOGRAPHY NECK: CPT

## 2021-10-05 PROCEDURE — 2580000003 HC RX 258: Performed by: EMERGENCY MEDICINE

## 2021-10-05 PROCEDURE — 83880 ASSAY OF NATRIURETIC PEPTIDE: CPT

## 2021-10-05 PROCEDURE — 6360000004 HC RX CONTRAST MEDICATION: Performed by: RADIOLOGY

## 2021-10-05 PROCEDURE — 71045 X-RAY EXAM CHEST 1 VIEW: CPT

## 2021-10-05 PROCEDURE — 70496 CT ANGIOGRAPHY HEAD: CPT

## 2021-10-05 PROCEDURE — 99282 EMERGENCY DEPT VISIT SF MDM: CPT

## 2021-10-05 PROCEDURE — 84484 ASSAY OF TROPONIN QUANT: CPT

## 2021-10-05 PROCEDURE — 93005 ELECTROCARDIOGRAM TRACING: CPT | Performed by: EMERGENCY MEDICINE

## 2021-10-05 RX ORDER — 0.9 % SODIUM CHLORIDE 0.9 %
1000 INTRAVENOUS SOLUTION INTRAVENOUS ONCE
Status: COMPLETED | OUTPATIENT
Start: 2021-10-05 | End: 2021-10-06

## 2021-10-05 RX ADMIN — IOPAMIDOL 100 ML: 755 INJECTION, SOLUTION INTRAVENOUS at 17:57

## 2021-10-05 RX ADMIN — SODIUM CHLORIDE 1000 ML: 9 INJECTION, SOLUTION INTRAVENOUS at 15:53

## 2021-10-06 NOTE — ED PROVIDER NOTES
MANJIT Gonsalves is a 79 y.o. female with a PMHx significant for hypertension, CVA (with lingering right-sided deficits), dementia and depression who presents from an outside facility with right upper extremity swelling and weakness. The patient's swelling is subacute, mild in severity, worsened by nothing and improved by nothing. On arrival, the patient was unable to provide any meaningful history secondary to her dementia. Her niece states that she lives with her sister and that the patient was seen by a general surgeon today due to concerns for possible gallbladder pathology. They state that she was diagnosed with Cholelithiasis, but due to her age and comorbidities there was no surgical intervention that was recommended. However, the surgeon was concerned about swelling in the patient's right upper extremity. The patient was unable to verbalize when the swelling began. Her niece states the patient had a stroke in the past and that her right upper and right lower extremities are significantly weak. She also notes chronic mild right-sided facial droop. However, she says that she did not notice the swelling in the patient's arm before today. The patient is currently taking no blood thinners. Tobacco Hx:   reports that she has never smoked. She has never used smokeless tobacco.    Alcohol Hx:   reports no history of alcohol use. Illicit Drug Hx:  No reported history of illicit drug use. The history is provided by the patient and family and obtained via extensive chart review. Last Tetanus (if applicable): N/A    Review of Systems   Unable to perform ROS: Dementia        Physical Exam  Vitals and nursing note reviewed. Constitutional:       General: She is awake. She is not in acute distress. Appearance: She is not diaphoretic. HENT:      Head: Normocephalic and atraumatic. Comments: No abnormal step-offs, hematomas or wounds noted.      Right Ear: External ear normal. Left Ear: External ear normal.      Nose: Nose normal. No congestion or rhinorrhea. Mouth/Throat:      Mouth: Mucous membranes are moist.      Pharynx: Oropharynx is clear. No oropharyngeal exudate or posterior oropharyngeal erythema. Eyes:      General: No visual field deficit or scleral icterus. Right eye: No discharge. Left eye: No discharge. Extraocular Movements: Extraocular movements intact. Conjunctiva/sclera: Conjunctivae normal.      Pupils: Pupils are equal, round, and reactive to light. Cardiovascular:      Rate and Rhythm: Normal rate and regular rhythm. Heart sounds: Normal heart sounds. No murmur heard. No friction rub. No gallop. Comments: Upper extremity and lower extremity distal pulses intact bilaterally +2/4. Right upper extremity nonpitting edema was noted. Pulmonary:      Effort: Pulmonary effort is normal. No respiratory distress. Breath sounds: Normal breath sounds. No wheezing, rhonchi or rales. Abdominal:      General: Bowel sounds are normal. There is no distension. Palpations: Abdomen is soft. Tenderness: There is no abdominal tenderness. There is no guarding or rebound. Musculoskeletal:         General: No tenderness or deformity. Normal range of motion. Cervical back: Normal range of motion and neck supple. No rigidity. No muscular tenderness. Right lower leg: No edema. Left lower leg: No edema. Comments: No obvious bruising to the right shoulder or right upper extremity. Normal range of motion throughout. Lymphadenopathy:      Cervical: No cervical adenopathy. Skin:     General: Skin is warm and dry. Capillary Refill: Capillary refill takes less than 2 seconds. Findings: No erythema or rash. Neurological:      Mental Status: She is alert. GCS: GCS eye subscore is 4. GCS verbal subscore is 4. GCS motor subscore is 6.       Cranial Nerves: Facial asymmetry (Mild right-sided facial droop) present. No cranial nerve deficit or dysarthria. Sensory: Sensation is intact. No sensory deficit. Motor: Weakness (Right upper extremity and right lower extremity) present. No tremor or abnormal muscle tone. Coordination: Coordination normal.      Comments: The patient was alert and oriented to self and place only. ---------------------------------- PAST HISTORY ---------------------------------------------  Past Medical History:  has no past medical history on file. Past Surgical History:  has no past surgical history on file. Social History:  reports that she has never smoked. She has never used smokeless tobacco. She reports that she does not drink alcohol and does not use drugs. Family History: family history is not on file. Home Meds: Not in a hospital admission. The patients home medications have been reviewed. Allergies: Patient has no known allergies. ------------------------- NURSING NOTES AND VITALS REVIEWED ---------------------------  Date / Time Roomed:  10/5/2021  3:02 PM  ED Bed Assignment:  24/24    The nursing notes within the ED encounter and vital signs as below have been reviewed. BP (!) 163/73   Pulse 80   Temp 98 °F (36.7 °C) (Oral)   Resp 14   SpO2 91%   -------------------------------------------------- RESULTS / INTERVENTIONS -------------------------------------------------  All laboratory and radiology tests have been reviewed by this physician.     LABS:  Results for orders placed or performed during the hospital encounter of 10/05/21   CBC Auto Differential   Result Value Ref Range    WBC 8.2 4.5 - 11.5 E9/L    RBC 4.33 3.50 - 5.50 E12/L    Hemoglobin 13.2 11.5 - 15.5 g/dL    Hematocrit 42.5 34.0 - 48.0 %    MCV 98.2 80.0 - 99.9 fL    MCH 30.5 26.0 - 35.0 pg    MCHC 31.1 (L) 32.0 - 34.5 %    RDW 14.0 11.5 - 15.0 fL    Platelets 355 945 - 613 E9/L    MPV 10.2 7.0 - 12.0 fL    Neutrophils % 65.1 43.0 - 80.0 %    Immature Granulocytes % 0.2 0.0 - 5.0 %    Lymphocytes % 23.9 20.0 - 42.0 %    Monocytes % 9.6 2.0 - 12.0 %    Eosinophils % 0.7 0.0 - 6.0 %    Basophils % 0.5 0.0 - 2.0 %    Neutrophils Absolute 5.35 1.80 - 7.30 E9/L    Immature Granulocytes # 0.02 E9/L    Lymphocytes Absolute 1.97 1.50 - 4.00 E9/L    Monocytes Absolute 0.79 0.10 - 0.95 E9/L    Eosinophils Absolute 0.06 0.05 - 0.50 E9/L    Basophils Absolute 0.04 0.00 - 0.20 E9/L   Comprehensive Metabolic Panel w/ Reflex to MG   Result Value Ref Range    Sodium 141 132 - 146 mmol/L    Potassium reflex Magnesium 3.9 3.5 - 5.0 mmol/L    Chloride 102 98 - 107 mmol/L    CO2 25 22 - 29 mmol/L    Anion Gap 14 7 - 16 mmol/L    Glucose 123 (H) 74 - 99 mg/dL    BUN 22 6 - 23 mg/dL    CREATININE 0.8 0.5 - 1.0 mg/dL    GFR Non-African American >60 >=60 mL/min/1.73    GFR African American >60     Calcium 10.0 8.6 - 10.2 mg/dL    Total Protein 7.4 6.4 - 8.3 g/dL    Albumin 4.0 3.5 - 5.2 g/dL    Total Bilirubin 0.3 0.0 - 1.2 mg/dL    Alkaline Phosphatase 88 35 - 104 U/L    ALT 9 0 - 32 U/L    AST 24 0 - 31 U/L   Troponin   Result Value Ref Range    Troponin, High Sensitivity 10 (H) 0 - 9 ng/L   Brain Natriuretic Peptide   Result Value Ref Range    Pro-BNP 69 0 - 450 pg/mL   Protime-INR   Result Value Ref Range    Protime 12.7 (H) 9.3 - 12.4 sec    INR 1.1    APTT   Result Value Ref Range    aPTT 29.2 24.5 - 35.1 sec   POCT Glucose   Result Value Ref Range    Glucose 124 mg/dL    QC OK? ok    EKG 12 Lead   Result Value Ref Range    Ventricular Rate 77 BPM    Atrial Rate 77 BPM    P-R Interval 150 ms    QRS Duration 106 ms    Q-T Interval 396 ms    QTc Calculation (Bazett) 448 ms    P Axis 33 degrees    R Axis -35 degrees    T Axis 43 degrees       RADIOLOGY: Interpreted by Radiologist unless otherwise noted. US DUP UPPER EXTREMITY RIGHT VENOUS   Final Result   No evidence of DVT. CTA HEAD W CONTRAST   Final Result   1. No perfusion mismatch.    2. No apparent arterial high grade stenosis, occlusion or aneurysm within the   head or neck. 3. Long segment atherosclerotic disease involving proximal left cervical ICA   resulting in up to 50% stenosis per NASCET criteria. CTA NECK W CONTRAST   Final Result   1. No perfusion mismatch. 2. No apparent arterial high grade stenosis, occlusion or aneurysm within the   head or neck. 3. Long segment atherosclerotic disease involving proximal left cervical ICA   resulting in up to 50% stenosis per NASCET criteria. CT BRAIN PERFUSION   Preliminary Result   1. No perfusion mismatch. 2. No apparent arterial high grade stenosis, occlusion or aneurysm within the   head or neck. 3. Long segment atherosclerotic disease involving proximal left cervical ICA   resulting in up to 50% stenosis per NASCET criteria. CT Head WO Contrast   Final Result   No acute intracranial hemorrhage. Ill-defined hypodensity in the right cerebellar hemisphere, suspicious for an   acute/subacute infarct. MRI may be obtained to confirm if clinically   indicated. Other chronic findings as above. XR CHEST PORTABLE   Final Result   No acute process. Oxygen Saturation Interpretation: Normal    Meds Given:  Medications   0.9 % sodium chloride bolus (1,000 mLs IntraVENous New Bag 10/5/21 3873)   iopamidol (ISOVUE-370) 76 % injection 100 mL (100 mLs IntraVENous Given 10/5/21 3364)       Procedures:  No procedures performed. --------------------------------- PROGRESS NOTES / ADDITIONAL PROVIDER NOTES ---------------------------------  Consultations:  As outlined below. ED Course:    ED Course as of Oct 05 2114   Tue Oct 05, 2021   1515 NIH Stroke Scale/Score at time of initial evaluation:  1A: Level of Consciousness 0 - alert; keenly responsive  1B: Ask Month and Age 1 - answers one question correctly  1C:  Tell Patient To Open and Close Eyes, then Hand  Squeeze 0 - performs both tasks correctly  2: Test Horizontal Extraocular Movements 0 - normal  3: Test Visual Fields 0 - no visual loss  4: Test Facial Palsy 1 - minor paralysis (flattened nasolabial fold, asymmetric on smiling)  5A: Test Left Arm Motor Drift 0 - no drift, limb holds 90 (or 45) degrees for full 10 seconds  5B: Test Right Arm Motor Drift 3 - no effort against gravity, limb falls  6A: Test Left Leg Motor Drift 0 - no drift; leg holds 30 degree position for full 5 seconds  6B: Test Right Leg Motor Drift 2 - some effort against gravity; leg falls to bed by 5 seconds but has some effort against gravity  7: Test Limb Ataxia   (FNF/Heel-Shin) 1 - present in one limb  8: Test Sensation 1 - mild to moderate sensory loss; patient feels pinprick is less sharp or is dull on the affected side; there is a loss of superficial pain with pinprick but patient is aware of being touched   9: Test Language/Aphasia 0 - no aphasia, normal  10: Test Dysarthria 0 - normal  11: Test Extinction/Inattention 0 - no abnormality  Total 9        [JA]   1526 Call was placed to the patient's sister who states that she was not with her today and has no idea what happened. Calls were also placed to the other family members listed in her chart who are apparently her niece and nephew. Those calls were not answered. Where at the present time searching the waiting room to see if there is any family here. [ML]   1216 After further review of the patient's chart as well as speaking with the patient's family, the patient recently had stroke with right hemiplegia. She is currently at her neurologic baseline. Stroke team will be canceled. [JA]   3701 EKG: This EKG is signed and interpreted by me.     Rate: 77  Rhythm: Sinus  Interpretation: Normal sinus rhythm, left axis deviation, normal IA interval, normal QRS, normal QT interval, no acute ST or T wave changes  Comparison: stable as compared to patient's most recent EKG    [JA]      ED Course User Index  [JA] Antoinette Nicole MD  [ML] Natalie [place], [place]       3312: All results were discussed with the patient and I have provided specific details regarding the plan of care, diagnosis and associated prognosis. The patient tolerated the visit well and, at the time of discharge, she was without objective evidence of hemodynamic instability or an acute process (biological or psychological) requiring hospitalization and inpatient management. The patient was seen by myself and the assigned attending physician, Dr. Olena Primrose, who agreed with my assessment and plan as laid out herein. The importance of follow-up was discussed at the end of the visit and I recommended that the patient be seen by her primary care physician in 2-3 days. The patient verbalized her understanding and agreement with the plan as presented and stated her intention to follow up. Reasons to return to the ER or seek immediate evaluation by a medical provider were discussed at length and all questions were answered. The patient was discharged home in stable condition. MDM:  Patient presented from an outside medical facility where there were concerns for right upper extremity edema. On arrival, the patient was hypertensive with remaining vital signs within normal limits. EKG and physical exam were as documented above. Initial concern for possible stroke due to right-sided weakness. However, the weakness seemed somewhat intermittent as on separate exams the patient was able to move both the right upper and right lower extremities though with notable weakness compared to the left side. Discussion with family and chart review revealed that the patient recently had a stroke and has associated residual right-sided weakness. However, the variability in her exam was concerning and a stroke protocol was initiated. Imaging did not reveal any acute findings indicating the presence of a bleed nor ischemic stroke. Chest x-ray did not reveal any cardiopulmonary pathology.   Duplex ultrasound of the right upper extremity was negative for DVT. Given her predominantly negative work-up, the decision was made to discharge the patient with recommended follow-up with her primary care physician in 2 to 3 days. The patient and her family verbalized understanding and agree with that plan. The patient was stable at the time of her disposition. New Prescriptions    No medications on file       Diagnosis:  1. Edema of right upper extremity    2. Right sided weakness        Disposition:  Patient's disposition: Discharge to home  Patient's condition is stable. This patient was seen, examined and treated with Dr. Jessica Kong. All pertinent aspects of the patient's care were discussed with the attending physician.        Wild Mott DO  Resident  10/06/21 4321

## 2021-10-10 PROBLEM — N39.0 UTI (URINARY TRACT INFECTION): Status: RESOLVED | Noted: 2020-01-28 | Resolved: 2021-10-10

## 2022-09-09 NOTE — ED PROVIDER NOTES
Skin:     General: Skin is warm and dry. Capillary Refill: Capillary refill takes less than 2 seconds. Neurological:      General: No focal deficit present. Mental Status: She is alert. Cranial Nerves: No cranial nerve deficit. Sensory: No sensory deficit. Motor: No weakness or abnormal muscle tone. Comments: Oriented to person and place but not time. Psychiatric:         Mood and Affect: Mood normal.         Behavior: Behavior normal.          Procedures     MDM   This is a 79-year-old female with a history of dementia, hypertension who presents to the emergency department with increased altered mental status. In the emergency department initially febrile mildly tachycardic. Concern for possible infectious etiology. Urinalysis consistent with UTI. Recent MRCP and therefore ultrasound right upper quadrant as well as CT imaging of the stomach obtained that showed no progression no signs of acute cholangitis or cholecystitis. Metabolic panel showed normal renal function normal electrolytes. Mild leukocytosis on CBC. Blood cultures x2 obtained. Patient started on ceftriaxone. Discussed patient with Susannah Read working with Dr. Marques Rosado who accepts patient for further evaluation.                 --------------------------------------------- PAST HISTORY ---------------------------------------------  Past Medical History:  has no past medical history on file. Past Surgical History:  has no past surgical history on file. Social History:  reports that she has never smoked. She has never used smokeless tobacco. She reports that she does not drink alcohol and does not use drugs. Family History: family history is not on file. The patients home medications have been reviewed. Allergies: Patient has no known allergies.     -------------------------------------------------- RESULTS -------------------------------------------------    LABS:  Results for orders placed or Ketones, Urine TRACE (A) Negative mg/dL    Specific Gravity, UA >=1.030 1.005 - 1.030    Blood, Urine MODERATE (A) Negative    pH, UA 5.5 5.0 - 9.0    Protein,  (A) Negative mg/dL    Urobilinogen, Urine 2.0 (A) <2.0 E.U./dL    Nitrite, Urine POSITIVE (A) Negative    Leukocyte Esterase, Urine TRACE (A) Negative   Lactate, Sepsis   Result Value Ref Range    Lactic Acid, Sepsis 1.8 0.5 - 1.9 mmol/L   Magnesium   Result Value Ref Range    Magnesium 2.1 1.6 - 2.6 mg/dL   Microscopic Urinalysis   Result Value Ref Range    WBC, UA 2-5 0 - 5 /HPF    RBC, UA 1-3 0 - 2 /HPF    Bacteria, UA MANY (A) None Seen /HPF       RADIOLOGY:  CT ABDOMEN PELVIS W IV CONTRAST Additional Contrast? None   Final Result   Cholelithiasis. Unchanged mild intrahepatic bile duct dilatation and moderate extrahepatic   bile duct dilatation extending to the ampulla. No CT evidence of   choledocholithiasis or an obstructing mass. Correlate clinically for   cholestasis. Follow-up MRCP would be helpful. Moderate sigmoid colon diverticulosis with no evidence of diverticulitis. Mild bibasilar dependent airspace disease consistent with atelectasis and or   pneumonia. US GALLBLADDER RUQ   Final Result   Echogenic foci along the gallbladder wall may represent small polyps versus   adherent stones. Superimposed adenomyomatosis of the gallbladder cannot be   excluded. XR CHEST PORTABLE   Final Result   No acute process. ------------------------- NURSING NOTES AND VITALS REVIEWED ---------------------------  Date / Time Roomed:  9/10/2021  6:11 PM  ED Bed Assignment:  24/24    The nursing notes within the ED encounter and vital signs as below have been reviewed.      Patient Vitals for the past 24 hrs:   BP Temp Temp src Pulse Resp SpO2 Height Weight   09/10/21 2323 (!) 159/85 99.1 °F (37.3 °C) -- 70 20 96 % -- --   09/10/21 2138 (!) 166/75 -- -- 73 20 92 % -- --   09/10/21 1838 -- 100.8 °F (38.2 °C) -- -- -- -- -- --   09/10/21 1814 (!) 165/75 98.2 °F (36.8 °C) Oral 98 16 93 % 5' 3\" (1.6 m) 134 lb (60.8 kg)       Oxygen Saturation Interpretation: Normal    ------------------------------------------ PROGRESS NOTES ------------------------------------------    Counseling:  I have spoken with the patient and discussed todays results, in addition to providing specific details for the plan of care and counseling regarding the diagnosis and prognosis. Their questions are answered at this time and they are agreeable with the plan of admission.    --------------------------------- ADDITIONAL PROVIDER NOTES ---------------------------------  Consultations:  Spoke with esperanza Victor working with Dr. Cecy Isaac. Discussed case. They will admit the patient. This patient's ED course included: a personal history and physicial examination, re-evaluation prior to disposition, multiple bedside re-evaluations, IV medications, cardiac monitoring and continuous pulse oximetry    This patient has remained hemodynamically stable during their ED course. Diagnosis:  1. Sepsis, due to unspecified organism, unspecified whether acute organ dysfunction present ([facility] [address]    2. UTI (urinary tract infection), bacterial    3. Altered mental status, unspecified altered mental status type        Disposition:  Patient's disposition: Admit to telemetry  Patient's condition is stable.         Catracho Casas DO  Resident  09/11/21 8824 Present, unchanged

## 2023-01-04 ENCOUNTER — APPOINTMENT (OUTPATIENT)
Dept: CT IMAGING | Age: 83
End: 2023-01-04
Payer: COMMERCIAL

## 2023-01-04 ENCOUNTER — HOSPITAL ENCOUNTER (EMERGENCY)
Age: 83
Discharge: HOME | End: 2023-01-05
Attending: EMERGENCY MEDICINE
Payer: COMMERCIAL

## 2023-01-04 ENCOUNTER — APPOINTMENT (OUTPATIENT)
Dept: GENERAL RADIOLOGY | Age: 83
End: 2023-01-04
Payer: COMMERCIAL

## 2023-01-04 DIAGNOSIS — R05.1 ACUTE COUGH: ICD-10-CM

## 2023-01-04 DIAGNOSIS — I69.30 HISTORY OF ISCHEMIC CEREBROVASCULAR ACCIDENT (CVA) WITH RESIDUAL DEFICIT: Primary | ICD-10-CM

## 2023-01-04 LAB
ALBUMIN SERPL-MCNC: 3.8 G/DL (ref 3.5–5.2)
ALP BLD-CCNC: 91 U/L (ref 35–104)
ALT SERPL-CCNC: 10 U/L (ref 0–32)
ANION GAP SERPL CALCULATED.3IONS-SCNC: 15 MMOL/L (ref 7–16)
AST SERPL-CCNC: 26 U/L (ref 0–31)
BACTERIA: ABNORMAL /HPF
BASOPHILS ABSOLUTE: 0.03 E9/L (ref 0–0.2)
BASOPHILS RELATIVE PERCENT: 0.3 % (ref 0–2)
BILIRUB SERPL-MCNC: 0.5 MG/DL (ref 0–1.2)
BILIRUBIN URINE: NEGATIVE
BLOOD, URINE: NEGATIVE
BUN BLDV-MCNC: 24 MG/DL (ref 6–23)
CALCIUM SERPL-MCNC: 9.3 MG/DL (ref 8.6–10.2)
CHLORIDE BLD-SCNC: 105 MMOL/L (ref 98–107)
CLARITY: CLEAR
CO2: 21 MMOL/L (ref 22–29)
COLOR: YELLOW
CREAT SERPL-MCNC: 0.9 MG/DL (ref 0.5–1)
EOSINOPHILS ABSOLUTE: 0.02 E9/L (ref 0.05–0.5)
EOSINOPHILS RELATIVE PERCENT: 0.2 % (ref 0–6)
GFR SERPL CREATININE-BSD FRML MDRD: >60 ML/MIN/1.73
GLUCOSE BLD-MCNC: 101 MG/DL (ref 74–99)
GLUCOSE URINE: NEGATIVE MG/DL
HCT VFR BLD CALC: 36.5 % (ref 34–48)
HEMOGLOBIN: 12.3 G/DL (ref 11.5–15.5)
IMMATURE GRANULOCYTES #: 0.04 E9/L
IMMATURE GRANULOCYTES %: 0.5 % (ref 0–5)
INFLUENZA A BY PCR: NOT DETECTED
INFLUENZA B BY PCR: NOT DETECTED
KETONES, URINE: NEGATIVE MG/DL
LEUKOCYTE ESTERASE, URINE: NEGATIVE
LYMPHOCYTES ABSOLUTE: 2.08 E9/L (ref 1.5–4)
LYMPHOCYTES RELATIVE PERCENT: 24.2 % (ref 20–42)
MAGNESIUM: 2.2 MG/DL (ref 1.6–2.6)
MCH RBC QN AUTO: 30.4 PG (ref 26–35)
MCHC RBC AUTO-ENTMCNC: 33.7 % (ref 32–34.5)
MCV RBC AUTO: 90.1 FL (ref 80–99.9)
METER GLUCOSE: 108 MG/DL (ref 74–99)
MONOCYTES ABSOLUTE: 1.02 E9/L (ref 0.1–0.95)
MONOCYTES RELATIVE PERCENT: 11.8 % (ref 2–12)
MUCUS: PRESENT /LPF
NEUTROPHILS ABSOLUTE: 5.42 E9/L (ref 1.8–7.3)
NEUTROPHILS RELATIVE PERCENT: 63 % (ref 43–80)
NITRITE, URINE: NEGATIVE
PDW BLD-RTO: 14.6 FL (ref 11.5–15)
PH UA: 6 (ref 5–9)
PLATELET # BLD: 200 E9/L (ref 130–450)
PMV BLD AUTO: 9.5 FL (ref 7–12)
POTASSIUM SERPL-SCNC: 3.8 MMOL/L (ref 3.5–5)
PRO-BNP: 109 PG/ML (ref 0–450)
PROTEIN UA: ABNORMAL MG/DL
RBC # BLD: 4.05 E12/L (ref 3.5–5.5)
RBC UA: ABNORMAL /HPF (ref 0–2)
SARS-COV-2, NAAT: NOT DETECTED
SODIUM BLD-SCNC: 141 MMOL/L (ref 132–146)
SPECIFIC GRAVITY UA: >=1.03 (ref 1–1.03)
TOTAL PROTEIN: 7 G/DL (ref 6.4–8.3)
TROPONIN, HIGH SENSITIVITY: 15 NG/L (ref 0–9)
TROPONIN, HIGH SENSITIVITY: 15 NG/L (ref 0–9)
TSH SERPL DL<=0.05 MIU/L-ACNC: 2.44 UIU/ML (ref 0.27–4.2)
UROBILINOGEN, URINE: 1 E.U./DL
WBC # BLD: 8.6 E9/L (ref 4.5–11.5)
WBC UA: ABNORMAL /HPF (ref 0–5)

## 2023-01-04 PROCEDURE — 70450 CT HEAD/BRAIN W/O DYE: CPT

## 2023-01-04 PROCEDURE — 36415 COLL VENOUS BLD VENIPUNCTURE: CPT

## 2023-01-04 PROCEDURE — 99285 EMERGENCY DEPT VISIT HI MDM: CPT

## 2023-01-04 PROCEDURE — 81001 URINALYSIS AUTO W/SCOPE: CPT

## 2023-01-04 PROCEDURE — 82962 GLUCOSE BLOOD TEST: CPT

## 2023-01-04 PROCEDURE — 87502 INFLUENZA DNA AMP PROBE: CPT

## 2023-01-04 PROCEDURE — 80053 COMPREHEN METABOLIC PANEL: CPT

## 2023-01-04 PROCEDURE — 93005 ELECTROCARDIOGRAM TRACING: CPT | Performed by: EMERGENCY MEDICINE

## 2023-01-04 PROCEDURE — 84484 ASSAY OF TROPONIN QUANT: CPT

## 2023-01-04 PROCEDURE — 84443 ASSAY THYROID STIM HORMONE: CPT

## 2023-01-04 PROCEDURE — 71045 X-RAY EXAM CHEST 1 VIEW: CPT

## 2023-01-04 PROCEDURE — 83880 ASSAY OF NATRIURETIC PEPTIDE: CPT

## 2023-01-04 PROCEDURE — 83735 ASSAY OF MAGNESIUM: CPT

## 2023-01-04 PROCEDURE — 85025 COMPLETE CBC W/AUTO DIFF WBC: CPT

## 2023-01-04 PROCEDURE — 87635 SARS-COV-2 COVID-19 AMP PRB: CPT

## 2023-01-04 RX ORDER — PHENOBARBITAL SODIUM 65 MG/ML
130 INJECTION INTRAMUSCULAR ONCE
Status: DISCONTINUED | OUTPATIENT
Start: 2023-01-04 | End: 2023-01-04

## 2023-01-04 ASSESSMENT — PAIN - FUNCTIONAL ASSESSMENT: PAIN_FUNCTIONAL_ASSESSMENT: NONE - DENIES PAIN

## 2023-01-04 NOTE — ED PROVIDER NOTES
[address]        Pt Name: Tenzin Subramanian  MRN: [id number]  [place] 1940  Date of evaluation: 1/4/2023  Provider: Deandre Lo DO  PCP: Kimi Roper III, DO  Note Started: 4:28 PM EST 1/4/23    CHIEF COMPLAINT       Chief Complaint   Patient presents with    Extremity Weakness     Right sided weakness that started about an hour ago per facility. Sent in by [facility] care for evaluation for stroke like symptoms. HISTORY OF PRESENT ILLNESS: 1 or more Elements   History From: EMS and family    Limitations to history : Dementia    Ely Hennessy is a 79 y.o. female who presents with concern for extremity weakness. Nursing facility told EMS that it began an hour prior to arrival and the nurse practitioner recommended she come to the emergency department for evaluation. Nursing facility at that the patient has chronic left-sided weakness from her prior CVA and the right-sided weakness was new. On discussion with family they note that the right-sided weakness is chronic. She does appear to be at baseline. No recent illnesses or no other acute complaints. Nursing Notes were all reviewed and agreed with or any disagreements were addressed in the HPI. REVIEW OF SYSTEMS :      Positives and Pertinent negatives as per HPI. SURGICAL HISTORY   History reviewed. No pertinent surgical history.     [address]       Discharge Medication List as of 1/4/2023  8:23 PM        CONTINUE these medications which have NOT CHANGED    Details   bisacodyl (DULCOLAX) 10 MG suppository Place 10 mg rectally daily as needed for ConstipationHistorical Med      Sodium Phosphates (FLEET) 7-19 GM/118ML Place 1 enema rectally daily as needed (constipation)Historical Med      aspirin 81 MG EC tablet Take 81 mg by mouth dailyHistorical Med      bisacodyl (DULCOLAX) 5 MG EC tablet Take 5 mg by mouth daily as needed for ConstipationHistorical Med      docusate sodium (COLACE) 100 MG capsule Take 100 mg by mouth 2 times dailyHistorical Med      divalproex (DEPAKOTE) 250 MG DR tablet Take 250 mg by mouth 3 times dailyHistorical Med      loperamide (IMODIUM) 2 MG capsule Take 2 mg by mouth 4 times daily as needed for DiarrheaHistorical Med      magnesium hydroxide (MILK OF MAGNESIA) 400 MG/5ML suspension Take 30 mLs by mouth daily as needed for Constipation Historical Med      ondansetron (ZOFRAN) 4 MG tablet Take 4 mg by mouth every 8 hours as needed for Nausea or VomitingHistorical Med      mirtazapine (REMERON) 15 MG tablet Take 15 mg by mouth nightlyHistorical Med      Vitamin D, Cholecalciferol, 50 MCG (2000 UT) CAPS Take 2,000 Units by mouth daily Historical Med      donepezil (ARICEPT) 10 MG tablet Take 10 mg by mouth nightly Historical Med      acetaminophen (TYLENOL) 325 MG tablet Take 650 mg by mouth every 6 hours as needed for Pain (mild) Historical Med             ALLERGIES     Patient has no known allergies. FAMILYHISTORY     History reviewed. No pertinent family history. SOCIAL HISTORY       Social History     Tobacco Use    Smoking status: Never    Smokeless tobacco: Never   Substance Use Topics    Alcohol use: Never    Drug use: Never       SCREENINGS   NIH Stroke Scale  Interval: Baseline  Level of Consciousness (1a): Alert  LOC Questions (1b): Answers one correctly  LOC Commands (1c): Performs both tasks correctly  Best Gaze (2): Normal  Visual (3): No visual loss  Facial Palsy (4): Normal symmetrical movement  Motor Arm, Left (5a): No drift  Motor Arm, Right (5b): No movement  Motor Leg, Left (6a): No drift  Motor Leg, Right (6b):  No drift  Limb Ataxia (7): Absent  Sensory (8): Normal  Best Language (9): No aphasia  Dysarthria (10): Normal  Extinction and Inattention (11): No abnormality  Total: 5    [place] Coma Scale  Eye Opening: Spontaneous  Best Verbal Response: Oriented  Best Motor Response: Obeys commands  Anaid Coma Scale Score: 15                CIWA Assessment  BP: (!) 157/70  Heart Rate: 69           PHYSICAL EXAM  1 or more Elements     ED Triage Vitals [01/04/23 1623]   BP Temp Temp src Heart Rate Resp SpO2 Height Weight   (!) 152/72 98.2 °F (36.8 °C) -- 75 16 93 % 5' 3\" (1.6 m) 168 lb (76.2 kg)         Constitutional/General: Pleasantly confused at baseline  Head: Normocephalic and atraumatic  Eyes: PERRL, EOMI, conjunctiva normal, sclera non icteric  ENT:  Oropharynx clear, handling secretions, no trismus, no asymmetry of the posterior oropharynx or uvular edema  Neck: Supple, full ROM, no stridor, no meningeal signs  Respiratory: Lungs clear to auscultation bilaterally, no wheezes, rales, or rhonchi. Not in respiratory distress  Cardiovascular:  Regular rate. Regular rhythm. No murmurs, no gallops, no rubs. 2+ distal pulses. Equal extremity pulses. Chest: No chest wall tenderness  GI:  Abdomen Soft, Non tender, Non distended. +BS. No rebound, guarding, or rigidity. No pulsatile masses. Musculoskeletal: Chronic right arm weakness. Warm and well perfused, no clubbing, no cyanosis, no edema. Capillary refill <3 seconds  Integument: skin warm and dry. No rashes. Neurologic: Confused at baseline, chronic right arm weakness with minimal effort against gravity, she will move both lower extremities.   Psychiatric: Normal Affect      DIAGNOSTIC RESULTS   LABS:    Labs Reviewed   CBC WITH AUTO DIFFERENTIAL - Abnormal; Notable for the following components:       Result Value    Monocytes Absolute 1.02 (*)     Eosinophils Absolute 0.02 (*)     All other components within normal limits   COMPREHENSIVE METABOLIC PANEL - Abnormal; Notable for the following components:    CO2 21 (*)     Glucose 101 (*)     BUN 24 (*)     All other components within normal limits   URINALYSIS WITH MICROSCOPIC - Abnormal; Notable for the following components:    Mucus, UA Present (*)     Bacteria, UA RARE (*)     All other components within normal limits   TROPONIN - Abnormal; Notable for the following components:    Troponin, High Sensitivity 15 (*)     All other components within normal limits   TROPONIN - Abnormal; Notable for the following components:    Troponin, High Sensitivity 15 (*)     All other components within normal limits   POCT GLUCOSE - Abnormal; Notable for the following components:    Meter Glucose 108 (*)     All other components within normal limits   COVID-19, RAPID   RAPID INFLUENZA A/B ANTIGENS   MAGNESIUM   TSH   BRAIN NATRIURETIC PEPTIDE       As interpreted by me, the above displayed labs are abnormal. All other labs obtained during this visit were within normal range or not returned as of this dictation. RADIOLOGY:   Non-plain film images such as CT, Ultrasound and MRI are read by the radiologist. Plain radiographic images are visualized and preliminarily interpreted by the ED Provider with the below findings:      Interpretation per the Radiologist below, if available at the time of this note:    CT HEAD WO CONTRAST   Final Result   No acute intracranial abnormality. Diffuse cerebral and cerebellar volume loss with severe chronic small vessel   ischemic white matter change and multiple old infarcts, as described above. XR CHEST PORTABLE   Final Result   Stable nonacute chest.           No results found. No results found. PROCEDURES   Unless otherwise noted below, none     PAST MEDICAL HISTORY/Chronic Conditions Affecting Care      has no past medical history on file.      EMERGENCY DEPARTMENT COURSE    Vitals:    Vitals:    01/04/23 2203 01/04/23 2236 01/05/23 0000 01/05/23 0132   BP: (!) 140/53 (!) 151/51 (!) 150/61 (!) 157/70   Pulse: 66 68 64 69   Resp: 16 15 17 16   Temp:       SpO2: 93% 95% 95% 94%   Weight:       Height:           Patient was given the following medications:  Medications - No data to display      Medical Decision Making/Differential Diagnosis:    CC/HPI Summary, Social Determinants of health, Records Reviewed, DDx, testing done/not done, ED Course, Reassessment, disposition considerations/shared decision making with patient, consults, disposition:      ED Course as of 01/10/23 1052   Wed Jan 04, 2023   1628 Spoke with the independent historian, patient's niece. Patient has had a prior stroke affecting the right side of her body. Therefore she always eats with her left hand and that the right arm weakness is not new. The medical records also reviewed by the internal medicine team from the nursing home. [SO]   2125 Spoke with the patient's niece, she is comfortable with the plan of sending the patient back to the nursing facility. No acute abnormalities that require immediate intervention or hospitalization at this time. With her dementia and multiple acute illnesses in the hospital there is a risk of keeping her in the hospital that would acutely worsen her. [SO]   2129 [place] unremarkable. [MM]   2147 EKG: Interpreted by me  Sinus rhythm, rate 66, leftward axis, no ST elevations or depressions, no T wave abnormalities. [SO]      ED Course User Index  [MM] Venu Brown DO  [SO] Augustus Apodaca DO        48-year-old female past medical history of CVA, hypertension, dementia presenting today with extremity weakness. History is provided by EMS and family. My attending personally called patient's family to obtain further history, they corrected nursing home story as patient has chronic right-sided weakness, not left-sided weakness, there are no acute deficits on arrival.  Physical exam reassuring for chronic deficits, no effort against gravity with right upper extremity which is unchanged from prior CVA, she is demented at baseline and able to provide some conversation. No other acute complaints. Differential diagnosis to include but not limited to chronic microvascular ischemia, hypoglycemia, sepsis.   On arrival to emergency department she is hemodynamically stable. CT head as interpreted by me with no acute intracranial hemorrhage, radiology confirms no acute changes, diffuse cerebellar and cerebral volume loss with severe chronic small vessel ischemic white matter changes. Lab work unremarkable, electrolytes within normal limits, kidney liver function normal, initial troponin 15 and repeat is pending. No infectious sources. With unremarkable testing, patient will be cleared for discharge and further outpatient management. Patient has remained hemodynamically stable, is pleasant, discussed plan with her and family prior to treatment. CONSULTS: (Who and What was discussed)  None      I am the Primary Clinician of Record. FINAL IMPRESSION      1. History of ischemic cerebrovascular accident (CVA) with residual deficit    2. Acute cough          DISPOSITION/PLAN     DISPOSITION Decision To Discharge 01/04/2023 11:15:19 PM      PATIENT REFERRED TO:  Antonino Sheets III, DO  [address]  [id number] [phone]    Call   follow up    DISCHARGE MEDICATIONS:  Discharge Medication List as of 1/4/2023  8:23 PM          DISCONTINUED MEDICATIONS:  Discharge Medication List as of 1/4/2023  8:23 PM                 (Please note that portions of this note were completed with a voice recognition program.  Efforts were made to edit the dictations but occasionally words are mis-transcribed. )    Donovan Stern DO (electronically signed)             Donovan Stern DO  Resident  01/10/23 9235

## 2023-01-05 VITALS
DIASTOLIC BLOOD PRESSURE: 70 MMHG | WEIGHT: 168 LBS | HEART RATE: 69 BPM | RESPIRATION RATE: 16 BRPM | HEIGHT: 63 IN | SYSTOLIC BLOOD PRESSURE: 157 MMHG | OXYGEN SATURATION: 94 % | TEMPERATURE: 98.2 F | BODY MASS INDEX: 29.77 KG/M2

## 2023-01-05 LAB
EKG ATRIAL RATE: 66 BPM
EKG P AXIS: 22 DEGREES
EKG P-R INTERVAL: 170 MS
EKG Q-T INTERVAL: 448 MS
EKG QRS DURATION: 106 MS
EKG QTC CALCULATION (BAZETT): 469 MS
EKG R AXIS: -27 DEGREES
EKG T AXIS: 20 DEGREES
EKG VENTRICULAR RATE: 66 BPM

## 2023-01-05 PROCEDURE — 93010 ELECTROCARDIOGRAM REPORT: CPT | Performed by: INTERNAL MEDICINE

## 2023-07-01 ENCOUNTER — APPOINTMENT (OUTPATIENT)
Dept: GENERAL RADIOLOGY | Age: 83
DRG: 308 | End: 2023-07-01
Payer: COMMERCIAL

## 2023-07-01 ENCOUNTER — HOSPITAL ENCOUNTER (INPATIENT)
Age: 83
LOS: 5 days | Discharge: NURSING, REHAB OR LONG-TERM CARE | DRG: 308 | End: 2023-07-07
Attending: STUDENT IN AN ORGANIZED HEALTH CARE EDUCATION/TRAINING PROGRAM | Admitting: INTERNAL MEDICINE
Payer: COMMERCIAL

## 2023-07-01 DIAGNOSIS — I26.93 SINGLE SUBSEGMENTAL PULMONARY EMBOLISM WITHOUT ACUTE COR PULMONALE (HCC): ICD-10-CM

## 2023-07-01 DIAGNOSIS — I48.91 NEW ONSET ATRIAL FIBRILLATION (HCC): Primary | ICD-10-CM

## 2023-07-01 DIAGNOSIS — J90 PLEURAL EFFUSION: ICD-10-CM

## 2023-07-01 PROCEDURE — 83880 ASSAY OF NATRIURETIC PEPTIDE: CPT

## 2023-07-01 PROCEDURE — 80053 COMPREHEN METABOLIC PANEL: CPT

## 2023-07-01 PROCEDURE — 99285 EMERGENCY DEPT VISIT HI MDM: CPT

## 2023-07-01 PROCEDURE — 84484 ASSAY OF TROPONIN QUANT: CPT

## 2023-07-01 PROCEDURE — 85610 PROTHROMBIN TIME: CPT

## 2023-07-01 PROCEDURE — 96365 THER/PROPH/DIAG IV INF INIT: CPT

## 2023-07-01 PROCEDURE — 85025 COMPLETE CBC W/AUTO DIFF WBC: CPT

## 2023-07-01 PROCEDURE — 81001 URINALYSIS AUTO W/SCOPE: CPT

## 2023-07-01 PROCEDURE — 80164 ASSAY DIPROPYLACETIC ACD TOT: CPT

## 2023-07-01 PROCEDURE — 83735 ASSAY OF MAGNESIUM: CPT

## 2023-07-01 PROCEDURE — 93005 ELECTROCARDIOGRAM TRACING: CPT | Performed by: EMERGENCY MEDICINE

## 2023-07-01 RX ORDER — 0.9 % SODIUM CHLORIDE 0.9 %
1000 INTRAVENOUS SOLUTION INTRAVENOUS ONCE
Status: COMPLETED | OUTPATIENT
Start: 2023-07-02 | End: 2023-07-02

## 2023-07-01 ASSESSMENT — PAIN - FUNCTIONAL ASSESSMENT: PAIN_FUNCTIONAL_ASSESSMENT: NONE - DENIES PAIN

## 2023-07-02 ENCOUNTER — APPOINTMENT (OUTPATIENT)
Dept: CT IMAGING | Age: 83
DRG: 308 | End: 2023-07-02
Payer: COMMERCIAL

## 2023-07-02 ENCOUNTER — APPOINTMENT (OUTPATIENT)
Dept: GENERAL RADIOLOGY | Age: 83
DRG: 308 | End: 2023-07-02
Payer: COMMERCIAL

## 2023-07-02 PROBLEM — I48.91 NEW ONSET ATRIAL FIBRILLATION (HCC): Status: ACTIVE | Noted: 2023-07-02

## 2023-07-02 LAB
ALBUMIN SERPL-MCNC: 2.7 G/DL (ref 3.5–5.2)
ALP SERPL-CCNC: 100 U/L (ref 35–104)
ALT SERPL-CCNC: 8 U/L (ref 0–32)
ANION GAP SERPL CALCULATED.3IONS-SCNC: 17 MMOL/L (ref 7–16)
ANISOCYTOSIS: ABNORMAL
APTT BLD: 22.6 SEC (ref 24.5–35.1)
APTT BLD: 56.8 SEC (ref 24.5–35.1)
APTT BLD: 58.3 SEC (ref 24.5–35.1)
APTT BLD: 65.8 SEC (ref 24.5–35.1)
APTT BLD: 86.6 SEC (ref 24.5–35.1)
AST SERPL-CCNC: 18 U/L (ref 0–31)
BACTERIA URNS QL MICRO: ABNORMAL /HPF
BASOPHILS # BLD: 0.04 E9/L (ref 0–0.2)
BASOPHILS NFR BLD: 0.4 % (ref 0–2)
BILIRUB SERPL-MCNC: 0.8 MG/DL (ref 0–1.2)
BILIRUB UR QL STRIP: NEGATIVE
BNP BLD-MCNC: 1548 PG/ML (ref 0–450)
BUN SERPL-MCNC: 62 MG/DL (ref 6–23)
BURR CELLS: ABNORMAL
CALCIUM SERPL-MCNC: 9.2 MG/DL (ref 8.6–10.2)
CHLORIDE SERPL-SCNC: 106 MMOL/L (ref 98–107)
CLARITY UR: ABNORMAL
CO2 SERPL-SCNC: 17 MMOL/L (ref 22–29)
COLOR UR: YELLOW
CREAT SERPL-MCNC: 1.2 MG/DL (ref 0.5–1)
CRYSTALS URNS MICRO: ABNORMAL /HPF
EOSINOPHIL # BLD: 0.01 E9/L (ref 0.05–0.5)
EOSINOPHIL NFR BLD: 0.1 % (ref 0–6)
ERYTHROCYTE [DISTWIDTH] IN BLOOD BY AUTOMATED COUNT: 15.8 FL (ref 11.5–15)
ERYTHROCYTE [DISTWIDTH] IN BLOOD BY AUTOMATED COUNT: 15.9 FL (ref 11.5–15)
GLUCOSE SERPL-MCNC: 118 MG/DL (ref 74–99)
GLUCOSE UR STRIP-MCNC: NEGATIVE MG/DL
HCT VFR BLD AUTO: 40.4 % (ref 34–48)
HCT VFR BLD AUTO: 41.5 % (ref 34–48)
HGB BLD-MCNC: 13.2 G/DL (ref 11.5–15.5)
HGB BLD-MCNC: 13.3 G/DL (ref 11.5–15.5)
HGB UR QL STRIP: ABNORMAL
IMM GRANULOCYTES # BLD: 0.16 E9/L
IMM GRANULOCYTES NFR BLD: 1.6 % (ref 0–5)
INR BLD: 1.2
KETONES UR STRIP-MCNC: NEGATIVE MG/DL
LEUKOCYTE ESTERASE UR QL STRIP: ABNORMAL
LYMPHOCYTES # BLD: 0.89 E9/L (ref 1.5–4)
LYMPHOCYTES NFR BLD: 9 % (ref 20–42)
MAGNESIUM SERPL-MCNC: 2.3 MG/DL (ref 1.6–2.6)
MCH RBC QN AUTO: 28.8 PG (ref 26–35)
MCH RBC QN AUTO: 29.2 PG (ref 26–35)
MCHC RBC AUTO-ENTMCNC: 31.8 % (ref 32–34.5)
MCHC RBC AUTO-ENTMCNC: 32.9 % (ref 32–34.5)
MCV RBC AUTO: 88.6 FL (ref 80–99.9)
MCV RBC AUTO: 90.6 FL (ref 80–99.9)
MONOCYTES # BLD: 0.94 E9/L (ref 0.1–0.95)
MONOCYTES NFR BLD: 9.5 % (ref 2–12)
NEUTROPHILS # BLD: 7.84 E9/L (ref 1.8–7.3)
NEUTS SEG NFR BLD: 79.4 % (ref 43–80)
NITRITE UR QL STRIP: NEGATIVE
OVALOCYTES: ABNORMAL
PH UR STRIP: 8 [PH] (ref 5–9)
PLATELET # BLD AUTO: 344 E9/L (ref 130–450)
PLATELET # BLD AUTO: 361 E9/L (ref 130–450)
PMV BLD AUTO: 9.7 FL (ref 7–12)
PMV BLD AUTO: 9.7 FL (ref 7–12)
POIKILOCYTES: ABNORMAL
POLYCHROMASIA: ABNORMAL
POTASSIUM SERPL-SCNC: 3.7 MMOL/L (ref 3.5–5)
PROT SERPL-MCNC: 6.7 G/DL (ref 6.4–8.3)
PROT UR STRIP-MCNC: 100 MG/DL
PROTHROMBIN TIME: 13 SEC (ref 9.3–12.4)
RBC # BLD AUTO: 4.56 E12/L (ref 3.5–5.5)
RBC # BLD AUTO: 4.58 E12/L (ref 3.5–5.5)
RBC #/AREA URNS HPF: ABNORMAL /HPF (ref 0–2)
SODIUM SERPL-SCNC: 140 MMOL/L (ref 132–146)
SP GR UR STRIP: 1.01 (ref 1–1.03)
T4 FREE SERPL-MCNC: 0.98 NG/DL (ref 0.93–1.7)
TROPONIN, HIGH SENSITIVITY: 79 NG/L (ref 0–9)
TROPONIN, HIGH SENSITIVITY: 84 NG/L (ref 0–9)
TSH SERPL-MCNC: 2.84 UIU/ML (ref 0.27–4.2)
UROBILINOGEN UR STRIP-ACNC: 1 E.U./DL
VALPROATE SERPL-MCNC: 3 MCG/ML (ref 50–100)
WBC # BLD: 9.7 E9/L (ref 4.5–11.5)
WBC # BLD: 9.9 E9/L (ref 4.5–11.5)
WBC #/AREA URNS HPF: ABNORMAL /HPF (ref 0–5)

## 2023-07-02 PROCEDURE — 87088 URINE BACTERIA CULTURE: CPT

## 2023-07-02 PROCEDURE — 2580000003 HC RX 258

## 2023-07-02 PROCEDURE — 70450 CT HEAD/BRAIN W/O DYE: CPT

## 2023-07-02 PROCEDURE — 6370000000 HC RX 637 (ALT 250 FOR IP)

## 2023-07-02 PROCEDURE — 2580000003 HC RX 258: Performed by: INTERNAL MEDICINE

## 2023-07-02 PROCEDURE — 85730 THROMBOPLASTIN TIME PARTIAL: CPT

## 2023-07-02 PROCEDURE — 2580000003 HC RX 258: Performed by: NURSE PRACTITIONER

## 2023-07-02 PROCEDURE — 87186 SC STD MICRODIL/AGAR DIL: CPT

## 2023-07-02 PROCEDURE — 6360000002 HC RX W HCPCS: Performed by: EMERGENCY MEDICINE

## 2023-07-02 PROCEDURE — 36415 COLL VENOUS BLD VENIPUNCTURE: CPT

## 2023-07-02 PROCEDURE — 84439 ASSAY OF FREE THYROXINE: CPT

## 2023-07-02 PROCEDURE — 6360000004 HC RX CONTRAST MEDICATION: Performed by: RADIOLOGY

## 2023-07-02 PROCEDURE — 71045 X-RAY EXAM CHEST 1 VIEW: CPT

## 2023-07-02 PROCEDURE — 6360000002 HC RX W HCPCS: Performed by: NURSE PRACTITIONER

## 2023-07-02 PROCEDURE — 85027 COMPLETE CBC AUTOMATED: CPT

## 2023-07-02 PROCEDURE — 2500000003 HC RX 250 WO HCPCS: Performed by: INTERNAL MEDICINE

## 2023-07-02 PROCEDURE — 2580000003 HC RX 258: Performed by: EMERGENCY MEDICINE

## 2023-07-02 PROCEDURE — 84484 ASSAY OF TROPONIN QUANT: CPT

## 2023-07-02 PROCEDURE — 71275 CT ANGIOGRAPHY CHEST: CPT

## 2023-07-02 PROCEDURE — 2140000000 HC CCU INTERMEDIATE R&B

## 2023-07-02 PROCEDURE — 99223 1ST HOSP IP/OBS HIGH 75: CPT | Performed by: INTERNAL MEDICINE

## 2023-07-02 PROCEDURE — 84443 ASSAY THYROID STIM HORMONE: CPT

## 2023-07-02 PROCEDURE — 6360000002 HC RX W HCPCS

## 2023-07-02 RX ORDER — ASPIRIN 81 MG/1
81 TABLET ORAL DAILY
Status: DISCONTINUED | OUTPATIENT
Start: 2023-07-02 | End: 2023-07-04

## 2023-07-02 RX ORDER — ACETAMINOPHEN 650 MG/1
650 SUPPOSITORY RECTAL EVERY 6 HOURS PRN
Status: DISCONTINUED | OUTPATIENT
Start: 2023-07-02 | End: 2023-07-07 | Stop reason: HOSPADM

## 2023-07-02 RX ORDER — POLYETHYLENE GLYCOL 3350 17 G/17G
17 POWDER, FOR SOLUTION ORAL DAILY PRN
Status: DISCONTINUED | OUTPATIENT
Start: 2023-07-02 | End: 2023-07-07 | Stop reason: HOSPADM

## 2023-07-02 RX ORDER — BISACODYL 10 MG
10 SUPPOSITORY, RECTAL RECTAL DAILY PRN
Status: DISCONTINUED | OUTPATIENT
Start: 2023-07-02 | End: 2023-07-07 | Stop reason: HOSPADM

## 2023-07-02 RX ORDER — SODIUM CHLORIDE 0.9 % (FLUSH) 0.9 %
10 SYRINGE (ML) INJECTION PRN
Status: DISCONTINUED | OUTPATIENT
Start: 2023-07-02 | End: 2023-07-07 | Stop reason: HOSPADM

## 2023-07-02 RX ORDER — SODIUM CHLORIDE 9 MG/ML
INJECTION, SOLUTION INTRAVENOUS CONTINUOUS
Status: DISCONTINUED | OUTPATIENT
Start: 2023-07-02 | End: 2023-07-02

## 2023-07-02 RX ORDER — DIVALPROEX SODIUM 250 MG/1
250 TABLET, DELAYED RELEASE ORAL 3 TIMES DAILY
Status: DISCONTINUED | OUTPATIENT
Start: 2023-07-02 | End: 2023-07-07 | Stop reason: HOSPADM

## 2023-07-02 RX ORDER — MEMANTINE HYDROCHLORIDE 10 MG/1
10 TABLET ORAL 2 TIMES DAILY
COMMUNITY

## 2023-07-02 RX ORDER — ONDANSETRON 2 MG/ML
4 INJECTION INTRAMUSCULAR; INTRAVENOUS EVERY 6 HOURS PRN
Status: DISCONTINUED | OUTPATIENT
Start: 2023-07-02 | End: 2023-07-02

## 2023-07-02 RX ORDER — HEPARIN SODIUM 1000 [USP'U]/ML
80 INJECTION, SOLUTION INTRAVENOUS; SUBCUTANEOUS ONCE
Status: COMPLETED | OUTPATIENT
Start: 2023-07-02 | End: 2023-07-02

## 2023-07-02 RX ORDER — ATORVASTATIN CALCIUM 10 MG/1
10 TABLET, FILM COATED ORAL DAILY
COMMUNITY

## 2023-07-02 RX ORDER — DILTIAZEM HYDROCHLORIDE 5 MG/ML
5 INJECTION INTRAVENOUS ONCE
Status: COMPLETED | OUTPATIENT
Start: 2023-07-02 | End: 2023-07-02

## 2023-07-02 RX ORDER — MENTHOL 40 MG/ML
GEL TOPICAL EVERY 12 HOURS PRN
Status: ON HOLD | COMMUNITY
End: 2023-07-06 | Stop reason: HOSPADM

## 2023-07-02 RX ORDER — CHOLECALCIFEROL (VITAMIN D3) 50 MCG
2000 TABLET ORAL DAILY
Status: DISCONTINUED | OUTPATIENT
Start: 2023-07-02 | End: 2023-07-07 | Stop reason: HOSPADM

## 2023-07-02 RX ORDER — HEPARIN SODIUM 10000 [USP'U]/100ML
5-30 INJECTION, SOLUTION INTRAVENOUS CONTINUOUS
Status: DISCONTINUED | OUTPATIENT
Start: 2023-07-02 | End: 2023-07-06

## 2023-07-02 RX ORDER — DONEPEZIL HYDROCHLORIDE 5 MG/1
10 TABLET, FILM COATED ORAL NIGHTLY
Status: DISCONTINUED | OUTPATIENT
Start: 2023-07-02 | End: 2023-07-07 | Stop reason: HOSPADM

## 2023-07-02 RX ORDER — MAGNESIUM SULFATE IN WATER 40 MG/ML
2000 INJECTION, SOLUTION INTRAVENOUS ONCE
Status: COMPLETED | OUTPATIENT
Start: 2023-07-02 | End: 2023-07-02

## 2023-07-02 RX ORDER — IBUPROFEN 600 MG/1
600 TABLET ORAL EVERY 8 HOURS PRN
Status: ON HOLD | COMMUNITY
End: 2023-07-06 | Stop reason: HOSPADM

## 2023-07-02 RX ORDER — ONDANSETRON 4 MG/1
4 TABLET, ORALLY DISINTEGRATING ORAL EVERY 8 HOURS PRN
Status: DISCONTINUED | OUTPATIENT
Start: 2023-07-02 | End: 2023-07-02

## 2023-07-02 RX ORDER — DOCUSATE SODIUM 100 MG/1
100 CAPSULE, LIQUID FILLED ORAL 2 TIMES DAILY
Status: DISCONTINUED | OUTPATIENT
Start: 2023-07-02 | End: 2023-07-07 | Stop reason: HOSPADM

## 2023-07-02 RX ORDER — LORATADINE 10 MG/1
10 TABLET ORAL DAILY
Status: ON HOLD | COMMUNITY
End: 2023-07-06 | Stop reason: HOSPADM

## 2023-07-02 RX ORDER — CODEINE PHOSPHATE AND GUAIFENESIN 10; 100 MG/5ML; MG/5ML
5 SOLUTION ORAL EVERY 4 HOURS PRN
Status: ON HOLD | COMMUNITY
End: 2023-07-06 | Stop reason: HOSPADM

## 2023-07-02 RX ORDER — SODIUM CHLORIDE 9 MG/ML
INJECTION, SOLUTION INTRAVENOUS PRN
Status: DISCONTINUED | OUTPATIENT
Start: 2023-07-02 | End: 2023-07-07 | Stop reason: HOSPADM

## 2023-07-02 RX ORDER — HEPARIN SODIUM 1000 [USP'U]/ML
40 INJECTION, SOLUTION INTRAVENOUS; SUBCUTANEOUS PRN
Status: DISCONTINUED | OUTPATIENT
Start: 2023-07-02 | End: 2023-07-06

## 2023-07-02 RX ORDER — ENEMA 19; 7 G/133ML; G/133ML
1 ENEMA RECTAL DAILY PRN
Status: DISCONTINUED | OUTPATIENT
Start: 2023-07-02 | End: 2023-07-07 | Stop reason: HOSPADM

## 2023-07-02 RX ORDER — HEPARIN SODIUM 1000 [USP'U]/ML
80 INJECTION, SOLUTION INTRAVENOUS; SUBCUTANEOUS PRN
Status: DISCONTINUED | OUTPATIENT
Start: 2023-07-02 | End: 2023-07-06

## 2023-07-02 RX ORDER — M-VIT,TX,IRON,MINS/CALC/FOLIC 27MG-0.4MG
1 TABLET ORAL DAILY
COMMUNITY

## 2023-07-02 RX ORDER — ACETAMINOPHEN 325 MG/1
650 TABLET ORAL EVERY 6 HOURS PRN
Status: DISCONTINUED | OUTPATIENT
Start: 2023-07-02 | End: 2023-07-07 | Stop reason: HOSPADM

## 2023-07-02 RX ORDER — MIRTAZAPINE 15 MG/1
15 TABLET, FILM COATED ORAL NIGHTLY
Status: DISCONTINUED | OUTPATIENT
Start: 2023-07-02 | End: 2023-07-07 | Stop reason: HOSPADM

## 2023-07-02 RX ORDER — SODIUM CHLORIDE 0.9 % (FLUSH) 0.9 %
5-40 SYRINGE (ML) INJECTION EVERY 12 HOURS SCHEDULED
Status: DISCONTINUED | OUTPATIENT
Start: 2023-07-02 | End: 2023-07-07 | Stop reason: HOSPADM

## 2023-07-02 RX ADMIN — DILTIAZEM HYDROCHLORIDE 5 MG: 5 INJECTION INTRAVENOUS at 11:18

## 2023-07-02 RX ADMIN — MIRTAZAPINE 15 MG: 15 TABLET, FILM COATED ORAL at 21:42

## 2023-07-02 RX ADMIN — METOPROLOL TARTRATE 25 MG: 25 TABLET, FILM COATED ORAL at 08:07

## 2023-07-02 RX ADMIN — DIVALPROEX SODIUM 250 MG: 250 TABLET, DELAYED RELEASE ORAL at 21:42

## 2023-07-02 RX ADMIN — MAGNESIUM SULFATE HEPTAHYDRATE 2000 MG: 40 INJECTION, SOLUTION INTRAVENOUS at 00:16

## 2023-07-02 RX ADMIN — Medication 2000 UNITS: at 08:07

## 2023-07-02 RX ADMIN — SODIUM CHLORIDE 2.5 MG/HR: 900 INJECTION, SOLUTION INTRAVENOUS at 11:25

## 2023-07-02 RX ADMIN — DIVALPROEX SODIUM 250 MG: 250 TABLET, DELAYED RELEASE ORAL at 14:21

## 2023-07-02 RX ADMIN — WATER 1000 MG: 1 INJECTION INTRAMUSCULAR; INTRAVENOUS; SUBCUTANEOUS at 08:37

## 2023-07-02 RX ADMIN — HEPARIN SODIUM 16 UNITS/KG/HR: 10000 INJECTION, SOLUTION INTRAVENOUS at 21:53

## 2023-07-02 RX ADMIN — SODIUM CHLORIDE: 9 INJECTION, SOLUTION INTRAVENOUS at 06:46

## 2023-07-02 RX ADMIN — HEPARIN SODIUM 6100 UNITS: 1000 INJECTION INTRAVENOUS; SUBCUTANEOUS at 01:55

## 2023-07-02 RX ADMIN — ASPIRIN 81 MG: 81 TABLET, COATED ORAL at 08:07

## 2023-07-02 RX ADMIN — DOCUSATE SODIUM 100 MG: 100 CAPSULE, LIQUID FILLED ORAL at 08:07

## 2023-07-02 RX ADMIN — METOPROLOL TARTRATE 25 MG: 25 TABLET, FILM COATED ORAL at 21:42

## 2023-07-02 RX ADMIN — IOPAMIDOL 75 ML: 755 INJECTION, SOLUTION INTRAVENOUS at 00:53

## 2023-07-02 RX ADMIN — Medication 10 ML: at 11:21

## 2023-07-02 RX ADMIN — SODIUM CHLORIDE, PRESERVATIVE FREE 10 ML: 5 INJECTION INTRAVENOUS at 08:39

## 2023-07-02 RX ADMIN — DIVALPROEX SODIUM 250 MG: 250 TABLET, DELAYED RELEASE ORAL at 08:07

## 2023-07-02 RX ADMIN — DONEPEZIL HYDROCHLORIDE 10 MG: 5 TABLET, FILM COATED ORAL at 21:42

## 2023-07-02 RX ADMIN — HEPARIN SODIUM 18 UNITS/KG/HR: 10000 INJECTION, SOLUTION INTRAVENOUS at 01:59

## 2023-07-02 RX ADMIN — Medication 10 ML: at 21:42

## 2023-07-02 RX ADMIN — DOCUSATE SODIUM 100 MG: 100 CAPSULE, LIQUID FILLED ORAL at 21:42

## 2023-07-02 RX ADMIN — SODIUM CHLORIDE 1000 ML: 9 INJECTION, SOLUTION INTRAVENOUS at 00:00

## 2023-07-02 ASSESSMENT — PAIN SCALES - GENERAL
PAINLEVEL_OUTOF10: 0
PAINLEVEL_OUTOF10: 0

## 2023-07-03 LAB
ALBUMIN SERPL-MCNC: 2.1 G/DL (ref 3.5–5.2)
ALP SERPL-CCNC: 113 U/L (ref 35–104)
ALT SERPL-CCNC: 9 U/L (ref 0–32)
ANION GAP SERPL CALCULATED.3IONS-SCNC: 14 MMOL/L (ref 7–16)
APTT BLD: 65.4 SEC (ref 24.5–35.1)
AST SERPL-CCNC: 23 U/L (ref 0–31)
BILIRUB SERPL-MCNC: 0.5 MG/DL (ref 0–1.2)
BNP BLD-MCNC: 2878 PG/ML (ref 0–450)
BUN SERPL-MCNC: 42 MG/DL (ref 6–23)
CALCIUM SERPL-MCNC: 8.6 MG/DL (ref 8.6–10.2)
CHLORIDE SERPL-SCNC: 107 MMOL/L (ref 98–107)
CO2 SERPL-SCNC: 18 MMOL/L (ref 22–29)
CREAT SERPL-MCNC: 0.9 MG/DL (ref 0.5–1)
GLUCOSE SERPL-MCNC: 76 MG/DL (ref 74–99)
INR BLD: 1.2
LV EF: 70 %
LVEF MODALITY: NORMAL
POTASSIUM SERPL-SCNC: 3.4 MMOL/L (ref 3.5–5)
PROT SERPL-MCNC: 6.2 G/DL (ref 6.4–8.3)
PROTHROMBIN TIME: 13.1 SEC (ref 9.3–12.4)
SODIUM SERPL-SCNC: 139 MMOL/L (ref 132–146)

## 2023-07-03 PROCEDURE — 2580000003 HC RX 258: Performed by: NURSE PRACTITIONER

## 2023-07-03 PROCEDURE — 97165 OT EVAL LOW COMPLEX 30 MIN: CPT

## 2023-07-03 PROCEDURE — 2580000003 HC RX 258: Performed by: INTERNAL MEDICINE

## 2023-07-03 PROCEDURE — 36415 COLL VENOUS BLD VENIPUNCTURE: CPT

## 2023-07-03 PROCEDURE — APPSS30 APP SPLIT SHARED TIME 16-30 MINUTES

## 2023-07-03 PROCEDURE — 2500000003 HC RX 250 WO HCPCS: Performed by: INTERNAL MEDICINE

## 2023-07-03 PROCEDURE — 6360000002 HC RX W HCPCS: Performed by: NURSE PRACTITIONER

## 2023-07-03 PROCEDURE — 6360000004 HC RX CONTRAST MEDICATION: Performed by: INTERNAL MEDICINE

## 2023-07-03 PROCEDURE — 6370000000 HC RX 637 (ALT 250 FOR IP)

## 2023-07-03 PROCEDURE — 85610 PROTHROMBIN TIME: CPT

## 2023-07-03 PROCEDURE — 83880 ASSAY OF NATRIURETIC PEPTIDE: CPT

## 2023-07-03 PROCEDURE — 6360000002 HC RX W HCPCS

## 2023-07-03 PROCEDURE — 97530 THERAPEUTIC ACTIVITIES: CPT

## 2023-07-03 PROCEDURE — 97535 SELF CARE MNGMENT TRAINING: CPT

## 2023-07-03 PROCEDURE — 2580000003 HC RX 258

## 2023-07-03 PROCEDURE — 6370000000 HC RX 637 (ALT 250 FOR IP): Performed by: FAMILY MEDICINE

## 2023-07-03 PROCEDURE — 80053 COMPREHEN METABOLIC PANEL: CPT

## 2023-07-03 PROCEDURE — 97161 PT EVAL LOW COMPLEX 20 MIN: CPT

## 2023-07-03 PROCEDURE — 2140000000 HC CCU INTERMEDIATE R&B

## 2023-07-03 PROCEDURE — 93306 TTE W/DOPPLER COMPLETE: CPT

## 2023-07-03 PROCEDURE — 85730 THROMBOPLASTIN TIME PARTIAL: CPT

## 2023-07-03 PROCEDURE — 99232 SBSQ HOSP IP/OBS MODERATE 35: CPT | Performed by: INTERNAL MEDICINE

## 2023-07-03 RX ORDER — POTASSIUM CHLORIDE 7.45 MG/ML
10 INJECTION INTRAVENOUS ONCE
Status: COMPLETED | OUTPATIENT
Start: 2023-07-03 | End: 2023-07-03

## 2023-07-03 RX ORDER — METOPROLOL SUCCINATE 50 MG/1
50 TABLET, EXTENDED RELEASE ORAL DAILY
Status: DISCONTINUED | OUTPATIENT
Start: 2023-07-05 | End: 2023-07-07 | Stop reason: HOSPADM

## 2023-07-03 RX ORDER — POTASSIUM CHLORIDE 20 MEQ/1
40 TABLET, EXTENDED RELEASE ORAL ONCE
Status: COMPLETED | OUTPATIENT
Start: 2023-07-03 | End: 2023-07-03

## 2023-07-03 RX ORDER — POTASSIUM CHLORIDE 20 MEQ/1
40 TABLET, EXTENDED RELEASE ORAL ONCE
Status: DISCONTINUED | OUTPATIENT
Start: 2023-07-03 | End: 2023-07-03

## 2023-07-03 RX ORDER — METOPROLOL TARTRATE 5 MG/5ML
2.5 INJECTION INTRAVENOUS ONCE
Status: DISCONTINUED | OUTPATIENT
Start: 2023-07-03 | End: 2023-07-07 | Stop reason: HOSPADM

## 2023-07-03 RX ADMIN — Medication 10 ML: at 20:53

## 2023-07-03 RX ADMIN — SODIUM CHLORIDE 2.5 MG/HR: 900 INJECTION, SOLUTION INTRAVENOUS at 19:22

## 2023-07-03 RX ADMIN — WATER 1000 MG: 1 INJECTION INTRAMUSCULAR; INTRAVENOUS; SUBCUTANEOUS at 09:04

## 2023-07-03 RX ADMIN — POTASSIUM CHLORIDE 10 MEQ: 7.46 INJECTION, SOLUTION INTRAVENOUS at 21:04

## 2023-07-03 RX ADMIN — PERFLUTREN 1.5 ML: 6.52 INJECTION, SUSPENSION INTRAVENOUS at 11:20

## 2023-07-03 RX ADMIN — HEPARIN SODIUM 16 UNITS/KG/HR: 10000 INJECTION, SOLUTION INTRAVENOUS at 19:27

## 2023-07-03 RX ADMIN — Medication 2000 UNITS: at 09:04

## 2023-07-03 RX ADMIN — POTASSIUM CHLORIDE 10 MEQ: 7.46 INJECTION, SOLUTION INTRAVENOUS at 22:36

## 2023-07-03 RX ADMIN — ASPIRIN 81 MG: 81 TABLET, COATED ORAL at 09:04

## 2023-07-03 RX ADMIN — POTASSIUM CHLORIDE 40 MEQ: 1500 TABLET, EXTENDED RELEASE ORAL at 10:30

## 2023-07-03 RX ADMIN — Medication 10 ML: at 11:23

## 2023-07-03 RX ADMIN — DIVALPROEX SODIUM 250 MG: 250 TABLET, DELAYED RELEASE ORAL at 13:55

## 2023-07-03 RX ADMIN — DIVALPROEX SODIUM 250 MG: 250 TABLET, DELAYED RELEASE ORAL at 09:05

## 2023-07-03 RX ADMIN — METOPROLOL TARTRATE 25 MG: 25 TABLET, FILM COATED ORAL at 09:04

## 2023-07-03 RX ADMIN — DOCUSATE SODIUM 100 MG: 100 CAPSULE, LIQUID FILLED ORAL at 09:04

## 2023-07-03 ASSESSMENT — PAIN SCALES - GENERAL: PAINLEVEL_OUTOF10: 0

## 2023-07-03 NOTE — PLAN OF CARE
Problem: Discharge Planning  Goal: Discharge to home or other facility with appropriate resources  Outcome: Progressing     Problem: Safety - Adult  Goal: Free from fall injury  7/3/2023 0939 by William Monique RN  Outcome: Progressing     Problem: Skin/Tissue Integrity  Goal: Absence of new skin breakdown  Description: 1. Monitor for areas of redness and/or skin breakdown  2. Assess vascular access sites hourly  3. Every 4-6 hours minimum:  Change oxygen saturation probe site  4. Every 4-6 hours:  If on nasal continuous positive airway pressure, respiratory therapy assess nares and determine need for appliance change or resting period.   7/3/2023 0939 by William Monique RN  Outcome: Progressing     Problem: ABCDS Injury Assessment  Goal: Absence of physical injury  7/3/2023 0939 by William Monique RN  Outcome: Progressing     Problem: Pain  Goal: Verbalizes/displays adequate comfort level or baseline comfort level  7/3/2023 0939 by William Monique RN  Outcome: Progressing

## 2023-07-03 NOTE — CARE COORDINATION
7/3/23  Attempted to speak with patient regarding transition of care but patient noted to have altered mental state with call placed to olu Nickerson who is POA. [place] states patient is dependent for all care and is a long term care resident at [facility]. Discharge goal is for patient to return to the nursing facility at discharge. Call also placed to the liaison for [facility] who confirms patient is a long term care bed hold and can return when medically stable with no pre-cert required. Patient admitted for A-Fib with pulmonary embolism noted. Patient is on a Heparin and Cardizem drip. Patient is being followed by cardiology and pulmonary. Patient is currently on 2 liters of 02 which is new and will attempt to wean. Patient is pending an ECHO. /CM to follow. Electronically signed by SONIA Woodall on 7/3/2023 at 2:34 PM     Case Management Assessment  Initial Evaluation    Date/Time of Evaluation: 7/3/2023 2:34 PM  Assessment Completed by: SONIA Woodall    If patient is discharged prior to next notation, then this note serves as note for discharge by case management. Patient Name: Raymundo Mclaughlin                   YOB: 1940  Diagnosis: Pleural effusion [J90]  New onset atrial fibrillation ([address]) [I48.91]  Single subsegmental pulmonary embolism without acute cor pulmonale ([address]) [I26.93]                   Date / Time: 7/1/2023 11:28 PM    Patient Admission Status: Inpatient   Readmission Risk (Low < 19, Mod (19-27), High > 27): Readmission Risk Score: 12.6    Current PCP: AMRIK PEACOCK III, DO  PCP verified by CM? Yes    Chart Reviewed: Yes      History Provided by: Medical Record, Child/Family  Patient Orientation: Alert and Oriented, Person    Patient Cognition: Dementia / Early Alzheimer's    Hospitalization in the last 30 days (Readmission):  No    If yes, Readmission Assessment in  Navigator will be completed.     Advance

## 2023-07-03 NOTE — ACP (ADVANCE CARE PLANNING)
Advance Care Planning   Healthcare Decision Maker:    Primary Decision Maker: Alessandra [place] - Niece/Nephew - [phone]    Secondary Decision Maker: mundo desir - [facility] - [phone]    Click here to complete Healthcare Decision Makers including selection of the Healthcare Decision Maker Relationship (ie \"Primary\").

## 2023-07-04 ENCOUNTER — APPOINTMENT (OUTPATIENT)
Dept: GENERAL RADIOLOGY | Age: 83
DRG: 308 | End: 2023-07-04
Payer: COMMERCIAL

## 2023-07-04 LAB
ANION GAP SERPL CALCULATED.3IONS-SCNC: 12 MMOL/L (ref 7–16)
APTT BLD: 109.9 SEC (ref 24.5–35.1)
APTT BLD: 125.4 SEC (ref 24.5–35.1)
APTT BLD: 65.7 SEC (ref 24.5–35.1)
BACTERIA UR CULT: ABNORMAL
BUN SERPL-MCNC: 35 MG/DL (ref 6–23)
CALCIUM SERPL-MCNC: 8.9 MG/DL (ref 8.6–10.2)
CHLORIDE SERPL-SCNC: 106 MMOL/L (ref 98–107)
CO2 SERPL-SCNC: 18 MMOL/L (ref 22–29)
CREAT SERPL-MCNC: 0.9 MG/DL (ref 0.5–1)
CRP SERPL HS-MCNC: 33.8 MG/DL (ref 0–0.4)
EKG ATRIAL RATE: 120 BPM
EKG Q-T INTERVAL: 350 MS
EKG QRS DURATION: 98 MS
EKG QTC CALCULATION (BAZETT): 528 MS
EKG R AXIS: -31 DEGREES
EKG T AXIS: 73 DEGREES
EKG VENTRICULAR RATE: 137 BPM
ERYTHROCYTE [DISTWIDTH] IN BLOOD BY AUTOMATED COUNT: 16.2 FL (ref 11.5–15)
ERYTHROCYTE [SEDIMENTATION RATE] IN BLOOD BY WESTERGREN METHOD: 25 MM/HR (ref 0–20)
GLUCOSE SERPL-MCNC: 82 MG/DL (ref 74–99)
HCT VFR BLD AUTO: 39.6 % (ref 34–48)
HGB BLD-MCNC: 12.8 G/DL (ref 11.5–15.5)
MCH RBC QN AUTO: 29.2 PG (ref 26–35)
MCHC RBC AUTO-ENTMCNC: 32.3 % (ref 32–34.5)
MCV RBC AUTO: 90.4 FL (ref 80–99.9)
ORGANISM: ABNORMAL
PLATELET # BLD AUTO: 339 E9/L (ref 130–450)
PMV BLD AUTO: 10.2 FL (ref 7–12)
POTASSIUM SERPL-SCNC: 3.8 MMOL/L (ref 3.5–5)
RBC # BLD AUTO: 4.38 E12/L (ref 3.5–5.5)
REASON FOR REJECTION: NORMAL
REJECTED TEST: NORMAL
SODIUM SERPL-SCNC: 136 MMOL/L (ref 132–146)
WBC # BLD: 23.8 E9/L (ref 4.5–11.5)

## 2023-07-04 PROCEDURE — 2140000000 HC CCU INTERMEDIATE R&B

## 2023-07-04 PROCEDURE — 80048 BASIC METABOLIC PNL TOTAL CA: CPT

## 2023-07-04 PROCEDURE — 2580000003 HC RX 258: Performed by: INTERNAL MEDICINE

## 2023-07-04 PROCEDURE — 6360000002 HC RX W HCPCS

## 2023-07-04 PROCEDURE — 93010 ELECTROCARDIOGRAM REPORT: CPT | Performed by: INTERNAL MEDICINE

## 2023-07-04 PROCEDURE — 85651 RBC SED RATE NONAUTOMATED: CPT

## 2023-07-04 PROCEDURE — 86140 C-REACTIVE PROTEIN: CPT

## 2023-07-04 PROCEDURE — 2580000003 HC RX 258

## 2023-07-04 PROCEDURE — 6360000002 HC RX W HCPCS: Performed by: NURSE PRACTITIONER

## 2023-07-04 PROCEDURE — 85027 COMPLETE CBC AUTOMATED: CPT

## 2023-07-04 PROCEDURE — 71045 X-RAY EXAM CHEST 1 VIEW: CPT

## 2023-07-04 PROCEDURE — 2500000003 HC RX 250 WO HCPCS: Performed by: NURSE PRACTITIONER

## 2023-07-04 PROCEDURE — 2580000003 HC RX 258: Performed by: NURSE PRACTITIONER

## 2023-07-04 PROCEDURE — 87040 BLOOD CULTURE FOR BACTERIA: CPT

## 2023-07-04 PROCEDURE — 6370000000 HC RX 637 (ALT 250 FOR IP)

## 2023-07-04 PROCEDURE — 36415 COLL VENOUS BLD VENIPUNCTURE: CPT

## 2023-07-04 PROCEDURE — 85730 THROMBOPLASTIN TIME PARTIAL: CPT

## 2023-07-04 PROCEDURE — 2500000003 HC RX 250 WO HCPCS: Performed by: INTERNAL MEDICINE

## 2023-07-04 PROCEDURE — 6370000000 HC RX 637 (ALT 250 FOR IP): Performed by: NURSE PRACTITIONER

## 2023-07-04 RX ORDER — POTASSIUM CHLORIDE 20 MEQ/1
40 TABLET, EXTENDED RELEASE ORAL PRN
Status: DISCONTINUED | OUTPATIENT
Start: 2023-07-04 | End: 2023-07-05

## 2023-07-04 RX ORDER — ASPIRIN 300 MG/1
300 SUPPOSITORY RECTAL DAILY
Status: DISCONTINUED | OUTPATIENT
Start: 2023-07-04 | End: 2023-07-07 | Stop reason: HOSPADM

## 2023-07-04 RX ORDER — POTASSIUM CHLORIDE 7.45 MG/ML
10 INJECTION INTRAVENOUS PRN
Status: DISCONTINUED | OUTPATIENT
Start: 2023-07-04 | End: 2023-07-05

## 2023-07-04 RX ORDER — POTASSIUM CHLORIDE 20 MEQ/1
20 TABLET, EXTENDED RELEASE ORAL ONCE
Status: DISCONTINUED | OUTPATIENT
Start: 2023-07-04 | End: 2023-07-05

## 2023-07-04 RX ORDER — SODIUM CHLORIDE 9 MG/ML
INJECTION, SOLUTION INTRAVENOUS CONTINUOUS
Status: ACTIVE | OUTPATIENT
Start: 2023-07-04 | End: 2023-07-04

## 2023-07-04 RX ORDER — ASPIRIN 81 MG/1
81 TABLET, CHEWABLE ORAL DAILY
Status: DISCONTINUED | OUTPATIENT
Start: 2023-07-04 | End: 2023-07-07 | Stop reason: HOSPADM

## 2023-07-04 RX ADMIN — ASPIRIN 81 MG 81 MG: 81 TABLET ORAL at 12:31

## 2023-07-04 RX ADMIN — SODIUM CHLORIDE 2.5 MG/HR: 900 INJECTION, SOLUTION INTRAVENOUS at 09:12

## 2023-07-04 RX ADMIN — VALPROATE SODIUM 250 MG: 100 INJECTION, SOLUTION INTRAVENOUS at 22:59

## 2023-07-04 RX ADMIN — VALPROATE SODIUM 250 MG: 100 INJECTION, SOLUTION INTRAVENOUS at 18:00

## 2023-07-04 RX ADMIN — BISACODYL 10 MG: 10 SUPPOSITORY RECTAL at 12:32

## 2023-07-04 RX ADMIN — HEPARIN SODIUM 14 UNITS/KG/HR: 10000 INJECTION, SOLUTION INTRAVENOUS at 21:24

## 2023-07-04 RX ADMIN — SODIUM CHLORIDE: 9 INJECTION, SOLUTION INTRAVENOUS at 09:07

## 2023-07-04 RX ADMIN — Medication 10 ML: at 08:46

## 2023-07-04 RX ADMIN — WATER 1000 MG: 1 INJECTION INTRAMUSCULAR; INTRAVENOUS; SUBCUTANEOUS at 08:46

## 2023-07-04 NOTE — PLAN OF CARE
Problem: Discharge Planning  Goal: Discharge to home or other facility with appropriate resources  7/3/2023 2048 by Kalin Arthur RN  Outcome: Progressing  7/3/2023 0939 by Jenae Read RN  Outcome: Progressing     Problem: Safety - Adult  Goal: Free from fall injury  7/3/2023 2048 by Kalin Arthur RN  Outcome: Progressing  7/3/2023 0939 by Jenae Read RN  Outcome: Progressing

## 2023-07-05 ENCOUNTER — APPOINTMENT (OUTPATIENT)
Dept: GENERAL RADIOLOGY | Age: 83
DRG: 308 | End: 2023-07-05
Payer: COMMERCIAL

## 2023-07-05 LAB
ANION GAP SERPL CALCULATED.3IONS-SCNC: 18 MMOL/L (ref 7–16)
APTT BLD: 57.8 SEC (ref 24.5–35.1)
APTT BLD: 83.7 SEC (ref 24.5–35.1)
APTT BLD: 95.6 SEC (ref 24.5–35.1)
BUN SERPL-MCNC: 31 MG/DL (ref 6–23)
CALCIUM SERPL-MCNC: 8.5 MG/DL (ref 8.6–10.2)
CHLORIDE SERPL-SCNC: 108 MMOL/L (ref 98–107)
CO2 SERPL-SCNC: 14 MMOL/L (ref 22–29)
CREAT SERPL-MCNC: 0.8 MG/DL (ref 0.5–1)
ERYTHROCYTE [DISTWIDTH] IN BLOOD BY AUTOMATED COUNT: 16.5 FL (ref 11.5–15)
GLUCOSE SERPL-MCNC: 75 MG/DL (ref 74–99)
HCT VFR BLD AUTO: 34.2 % (ref 34–48)
HGB BLD-MCNC: 10.7 G/DL (ref 11.5–15.5)
MCH RBC QN AUTO: 29.2 PG (ref 26–35)
MCHC RBC AUTO-ENTMCNC: 31.3 % (ref 32–34.5)
MCV RBC AUTO: 93.4 FL (ref 80–99.9)
PLATELET # BLD AUTO: 294 E9/L (ref 130–450)
PMV BLD AUTO: 11.7 FL (ref 7–12)
POTASSIUM SERPL-SCNC: 3.6 MMOL/L (ref 3.5–5)
RBC # BLD AUTO: 3.66 E12/L (ref 3.5–5.5)
SODIUM SERPL-SCNC: 140 MMOL/L (ref 132–146)
WBC # BLD: 25.6 E9/L (ref 4.5–11.5)

## 2023-07-05 PROCEDURE — 36415 COLL VENOUS BLD VENIPUNCTURE: CPT

## 2023-07-05 PROCEDURE — 6370000000 HC RX 637 (ALT 250 FOR IP)

## 2023-07-05 PROCEDURE — 6370000000 HC RX 637 (ALT 250 FOR IP): Performed by: INTERNAL MEDICINE

## 2023-07-05 PROCEDURE — 2580000003 HC RX 258

## 2023-07-05 PROCEDURE — APPSS30 APP SPLIT SHARED TIME 16-30 MINUTES

## 2023-07-05 PROCEDURE — 85730 THROMBOPLASTIN TIME PARTIAL: CPT

## 2023-07-05 PROCEDURE — 2500000003 HC RX 250 WO HCPCS: Performed by: NURSE PRACTITIONER

## 2023-07-05 PROCEDURE — 80048 BASIC METABOLIC PNL TOTAL CA: CPT

## 2023-07-05 PROCEDURE — 2500000003 HC RX 250 WO HCPCS: Performed by: RADIOLOGY

## 2023-07-05 PROCEDURE — 92611 MOTION FLUOROSCOPY/SWALLOW: CPT

## 2023-07-05 PROCEDURE — 2140000000 HC CCU INTERMEDIATE R&B

## 2023-07-05 PROCEDURE — 74230 X-RAY XM SWLNG FUNCJ C+: CPT

## 2023-07-05 PROCEDURE — 85027 COMPLETE CBC AUTOMATED: CPT

## 2023-07-05 PROCEDURE — 92526 ORAL FUNCTION THERAPY: CPT

## 2023-07-05 PROCEDURE — 2580000003 HC RX 258: Performed by: NURSE PRACTITIONER

## 2023-07-05 RX ORDER — POTASSIUM CHLORIDE 20 MEQ/1
40 TABLET, EXTENDED RELEASE ORAL ONCE
Status: COMPLETED | OUTPATIENT
Start: 2023-07-05 | End: 2023-07-05

## 2023-07-05 RX ADMIN — POTASSIUM CHLORIDE 40 MEQ: 1500 TABLET, EXTENDED RELEASE ORAL at 18:12

## 2023-07-05 RX ADMIN — Medication 10 ML: at 09:30

## 2023-07-05 RX ADMIN — DONEPEZIL HYDROCHLORIDE 10 MG: 5 TABLET, FILM COATED ORAL at 20:58

## 2023-07-05 RX ADMIN — METOPROLOL SUCCINATE 50 MG: 50 TABLET, EXTENDED RELEASE ORAL at 18:11

## 2023-07-05 RX ADMIN — VALPROATE SODIUM 250 MG: 100 INJECTION, SOLUTION INTRAVENOUS at 23:46

## 2023-07-05 RX ADMIN — DOCUSATE SODIUM 100 MG: 100 CAPSULE, LIQUID FILLED ORAL at 20:57

## 2023-07-05 RX ADMIN — VALPROATE SODIUM 250 MG: 100 INJECTION, SOLUTION INTRAVENOUS at 09:30

## 2023-07-05 RX ADMIN — VALPROATE SODIUM 250 MG: 100 INJECTION, SOLUTION INTRAVENOUS at 15:54

## 2023-07-05 RX ADMIN — MIRTAZAPINE 15 MG: 15 TABLET, FILM COATED ORAL at 20:58

## 2023-07-05 RX ADMIN — BARIUM SULFATE 15 ML: 400 SUSPENSION ORAL at 14:53

## 2023-07-05 RX ADMIN — BARIUM SULFATE 15 ML: 400 PASTE ORAL at 14:52

## 2023-07-05 RX ADMIN — Medication 10 ML: at 20:58

## 2023-07-05 RX ADMIN — BARIUM SULFATE 15 ML: 0.81 POWDER, FOR SUSPENSION ORAL at 14:53

## 2023-07-05 NOTE — CONSULTS
[address], [address] [place], [place] [address]                                  CONSULTATION    PATIENT NAME: Raymundo Mclaughlin                     :        1940  MED REC NO:   [id number]                            ROOM:       12  ACCOUNT NO:   [de-identified]                           ADMIT DATE: 2023  PROVIDER:     Hemalatha Castro MD    CONSULT DATE:  2023    PULMONARY CONSULT    REQUESTED BY:  Neris Knight MD    REASON FOR CONSULTATION:  PE.    IMPRESSION:  Small right lower lobe pulmonary embolism with mild  hypoxemia, agree with the heparin, and will need to be on long-term  anticoagulation for the PE and the atrial fibrillation with most likely  apixaban 10 b.i.d. for a week and then 5 b.i.d. HISTORY OF PRESENT ILLNESS:  History is obtained from reviewing the  chart and interviewing the patient as well as her niece who provides  most of the history. She reportedly was brought in with concern for  palpitations, which she denies to me. She does admit she was a little  short of breath at the nursing home and her niece reports that she was  hypotensive. She apparently was hypoxic as well down to 89% on room  air, which came up successfully with 2 liters of supplemental oxygen. She had no cough or wheezing, and denied any chest pain. She went to  the ED where she was found to be in atrial fibrillation. She also was  thought to possibly have another right-sided stroke after having prior  right-sided weakness from an old stroke. Workup revealed PE in the  right lower lobe as well as an EKG showing atrial fibrillation. She was  started on heparin and was admitted. She is now on 3 liters, satting  98%. She has no complaints. She is a lifelong nonsmoker and has no  respiratory history. PAST MEDICAL HISTORY:  Includes seizures, but her niece tells me it is  really strokes.   She also has dementia, for which she is in
Comprehensive Nutrition Assessment    Type and Reason for Visit:  Initial, Consult, Wound    Nutrition Recommendations/Plan:   Continue NPO status and advance diet as tolerated per SLP recommendations   Recommend ONS to aid in wound healing when diet able to be advanced       Malnutrition Assessment:  Malnutrition Status: At risk for malnutrition (Comment) (07/05/23 1214)    Context:  Acute Illness     Findings of the 6 clinical characteristics of malnutrition:  Energy Intake:  Mild decrease in energy intake (Comment) (NPO)  Weight Loss:  Unable to assess     Body Fat Loss:  Unable to assess     Muscle Mass Loss:  Unable to assess    Fluid Accumulation:  No significant fluid accumulation     Strength:  Not Performed    Nutrition Assessment:    Pt admit w/ new onset Afib. Noted Pulmonary emboli w/ right pleural effusion, LUZ ELENA, UTI. PMHx of seizures, dementia. Concerns for dysphagia, NPO pending MBS. Noted wound, will monitor for nutrition progression & add ONS as indicated    Nutrition Related Findings:    abd soft, nontender, nondistended, active BS x4, constipation noted, A&Ox2, +1 edema, I/O's -2.59L since admit, NPO status Wound Type: Pressure Injury (R heel PI)       Current Nutrition Intake & Therapies:    Average Meal Intake: NPO  Average Supplements Intake: NPO  Diet NPO    Anthropometric Measures:  Height: 5' 3\" (160 cm)  Ideal Body Weight (IBW): 115 lbs (52 kg)    Admission Body Weight: 168 lb (76.2 kg)  Current Body Weight: 168 lb (76.2 kg) (7/2 not specified, unable to obtain bed scale wt at this time), 146.1 % IBW. Weight Source: Not Specified  Current BMI (kg/m2): 29.8  Usual Body Weight: 168 lb 3 oz (76.3 kg) (9/2021)  % Weight Change (Calculated): -0.1  Weight Adjustment For: No Adjustment  BMI Categories: Overweight (BMI 25.0-29. 9)    Estimated Daily Nutrient Needs:  Energy Requirements Based On: Formula  Weight Used for Energy Requirements: Current  Energy (kcal/day): 1400-1500kcal/day [place] CONSULT NOTE    Reason for Consult: Leukocytosis   Requested by:  DOMINIQUE Lopez CNP    Chief complaint: Palpitations    History Obtained From: EMR and patient    HISTORY OFPRESENT ILLNESS              The patient is a 79 y.o. female with history of stroke with residual right-sided weakness, dementia, seizure disorder, presented on 07/01 with palpitations, found to be hypoxic and in atrial fibrillation with rapid ventricular response. On admission, she was afebrile and hemodynamically stable with no leukocytosis. Chest CTA showed small right lower lobar/segmental pulmonary embolus. Urinalysis showed pyuria of 5-10 WBCs with urine culture growing >100,000 CFU/mL of Proteus mirabilis (non-ESBL). Ceftriaxone was started on admission. She had leukocytosis of 23,000 on 07/04. CXR showed development of a bi basilar areas of discrete infiltrate and or atelectasis with minimal pleural reaction bilaterally. ID service was subsequently consulted for further recommendations. Past Medical History  History reviewed. No pertinent past medical history.     Current Facility-Administered Medications   Medication Dose Route Frequency Provider Last Rate Last Admin    potassium chloride (KLOR-CON M) extended release tablet 20 mEq  20 mEq Oral Once Chyrel Acron, DO        0.9 % sodium chloride infusion   IntraVENous Continuous Chyrel Acron,  mL/hr at 07/04/23 0907 New Bag at 07/04/23 0907    aspirin chewable tablet 81 mg  81 mg Oral Daily DOMINIQUE Lopez CNP   81 mg at 07/04/23 1231    Or    aspirin suppository 300 mg  300 mg Rectal Daily DOMINIQUE Lopez CNP        [START ON 7/5/2023] metoprolol succinate (TOPROL XL) extended release tablet 50 mg  50 mg Oral Daily Chyrel Acron, DO        metoprolol (LOPRESSOR) injection 2.5 mg  2.5 mg IntraVENous Once DOMINIQUE Cardoso Si, CNP        heparin (porcine) injection 6,100 Units  80 Units/kg IntraVENous PRN DOMINIQUE Ely CNP
JOINT PAIN

## 2023-07-05 NOTE — PLAN OF CARE
Problem: Discharge Planning  Goal: Discharge to home or other facility with appropriate resources  Outcome: Progressing     Problem: Safety - Adult  Goal: Free from fall injury  Outcome: Progressing     Problem: Skin/Tissue Integrity  Goal: Absence of new skin breakdown  Description: 1. Monitor for areas of redness and/or skin breakdown  2. Assess vascular access sites hourly  3. Every 4-6 hours minimum:  Change oxygen saturation probe site  4. Every 4-6 hours:  If on nasal continuous positive airway pressure, respiratory therapy assess nares and determine need for appliance change or resting period.   Outcome: Progressing     Problem: ABCDS Injury Assessment  Goal: Absence of physical injury  Outcome: Progressing     Problem: Pain  Goal: Verbalizes/displays adequate comfort level or baseline comfort level  Outcome: Progressing     Problem: Nutrition Deficit:  Goal: Optimize nutritional status  7/5/2023 1530 by Shalini Jordan RN  Outcome: Progressing

## 2023-07-05 NOTE — CARE COORDINATION
7/5/23  Transition of Care update. Patient admitted for PE with new onset of A-Fib. Patient is on a Cardizem and heparin drip. Patient is planned for a MBS today and is currently NPO. Patient is on room air and is in normal sinus rhythm. Discharge goal is to return to [facility] when medically stable. [facility] is long term care and will not need a pre-cert. DARRION and destination updated. NO HENS assessment required. Ambulance started and on the soft chart. DESIREE/VIOLA to follow.     Electronically signed by SONIA Fallon on 7/5/2023 at 1:00 PM

## 2023-07-06 ENCOUNTER — TELEPHONE (OUTPATIENT)
Dept: CARDIOLOGY CLINIC | Age: 83
End: 2023-07-06

## 2023-07-06 LAB
ANION GAP SERPL CALCULATED.3IONS-SCNC: 12 MMOL/L (ref 7–16)
APTT BLD: 66.4 SEC (ref 24.5–35.1)
APTT BLD: 73.3 SEC (ref 24.5–35.1)
BUN SERPL-MCNC: 22 MG/DL (ref 6–23)
CALCIUM SERPL-MCNC: 8.3 MG/DL (ref 8.6–10.2)
CHLORIDE SERPL-SCNC: 111 MMOL/L (ref 98–107)
CO2 SERPL-SCNC: 18 MMOL/L (ref 22–29)
CREAT SERPL-MCNC: 0.7 MG/DL (ref 0.5–1)
ERYTHROCYTE [DISTWIDTH] IN BLOOD BY AUTOMATED COUNT: 16.1 FL (ref 11.5–15)
GLUCOSE SERPL-MCNC: 95 MG/DL (ref 74–99)
HCT VFR BLD AUTO: 34.6 % (ref 34–48)
HGB BLD-MCNC: 11.1 G/DL (ref 11.5–15.5)
MCH RBC QN AUTO: 28.6 PG (ref 26–35)
MCHC RBC AUTO-ENTMCNC: 32.1 % (ref 32–34.5)
MCV RBC AUTO: 89.2 FL (ref 80–99.9)
PLATELET # BLD AUTO: 366 E9/L (ref 130–450)
PMV BLD AUTO: 9.9 FL (ref 7–12)
POTASSIUM SERPL-SCNC: 3.5 MMOL/L (ref 3.5–5)
RBC # BLD AUTO: 3.88 E12/L (ref 3.5–5.5)
SODIUM SERPL-SCNC: 141 MMOL/L (ref 132–146)
WBC # BLD: 21.5 E9/L (ref 4.5–11.5)

## 2023-07-06 PROCEDURE — 6370000000 HC RX 637 (ALT 250 FOR IP): Performed by: INTERNAL MEDICINE

## 2023-07-06 PROCEDURE — 85730 THROMBOPLASTIN TIME PARTIAL: CPT

## 2023-07-06 PROCEDURE — 6370000000 HC RX 637 (ALT 250 FOR IP)

## 2023-07-06 PROCEDURE — 92526 ORAL FUNCTION THERAPY: CPT

## 2023-07-06 PROCEDURE — 2580000003 HC RX 258

## 2023-07-06 PROCEDURE — 85027 COMPLETE CBC AUTOMATED: CPT

## 2023-07-06 PROCEDURE — 80048 BASIC METABOLIC PNL TOTAL CA: CPT

## 2023-07-06 PROCEDURE — 36415 COLL VENOUS BLD VENIPUNCTURE: CPT

## 2023-07-06 PROCEDURE — 6360000002 HC RX W HCPCS

## 2023-07-06 PROCEDURE — 2140000000 HC CCU INTERMEDIATE R&B

## 2023-07-06 PROCEDURE — 6370000000 HC RX 637 (ALT 250 FOR IP): Performed by: NURSE PRACTITIONER

## 2023-07-06 RX ORDER — ASPIRIN 81 MG/1
81 TABLET, CHEWABLE ORAL DAILY
Qty: 30 TABLET | Refills: 3 | DISCHARGE
Start: 2023-07-07

## 2023-07-06 RX ORDER — DIVALPROEX SODIUM 250 MG/1
250 TABLET, DELAYED RELEASE ORAL 3 TIMES DAILY
Qty: 90 TABLET | DISCHARGE
Start: 2023-07-06

## 2023-07-06 RX ORDER — POTASSIUM CHLORIDE 20 MEQ/1
40 TABLET, EXTENDED RELEASE ORAL ONCE
Status: COMPLETED | OUTPATIENT
Start: 2023-07-06 | End: 2023-07-06

## 2023-07-06 RX ORDER — METOPROLOL SUCCINATE 50 MG/1
50 TABLET, EXTENDED RELEASE ORAL DAILY
Qty: 30 TABLET | Refills: 3 | DISCHARGE
Start: 2023-07-07

## 2023-07-06 RX ORDER — CHOLECALCIFEROL (VITAMIN D3) 50 MCG
2000 TABLET ORAL DAILY
Qty: 30 TABLET | DISCHARGE
Start: 2023-07-07

## 2023-07-06 RX ADMIN — DOCUSATE SODIUM 100 MG: 100 CAPSULE, LIQUID FILLED ORAL at 21:09

## 2023-07-06 RX ADMIN — Medication 10 ML: at 21:09

## 2023-07-06 RX ADMIN — DIVALPROEX SODIUM 250 MG: 250 TABLET, DELAYED RELEASE ORAL at 12:56

## 2023-07-06 RX ADMIN — METOPROLOL SUCCINATE 50 MG: 50 TABLET, EXTENDED RELEASE ORAL at 09:04

## 2023-07-06 RX ADMIN — HEPARIN SODIUM 10 UNITS/KG/HR: 10000 INJECTION, SOLUTION INTRAVENOUS at 00:41

## 2023-07-06 RX ADMIN — DIVALPROEX SODIUM 250 MG: 250 TABLET, DELAYED RELEASE ORAL at 09:21

## 2023-07-06 RX ADMIN — APIXABAN 10 MG: 5 TABLET, FILM COATED ORAL at 21:09

## 2023-07-06 RX ADMIN — DIVALPROEX SODIUM 250 MG: 250 TABLET, DELAYED RELEASE ORAL at 21:10

## 2023-07-06 RX ADMIN — MIRTAZAPINE 15 MG: 15 TABLET, FILM COATED ORAL at 21:11

## 2023-07-06 RX ADMIN — APIXABAN 10 MG: 5 TABLET, FILM COATED ORAL at 12:55

## 2023-07-06 RX ADMIN — ASPIRIN 81 MG 81 MG: 81 TABLET ORAL at 09:01

## 2023-07-06 RX ADMIN — Medication 2000 UNITS: at 09:04

## 2023-07-06 RX ADMIN — DOCUSATE SODIUM 100 MG: 100 CAPSULE, LIQUID FILLED ORAL at 09:04

## 2023-07-06 RX ADMIN — DONEPEZIL HYDROCHLORIDE 10 MG: 5 TABLET, FILM COATED ORAL at 21:09

## 2023-07-06 RX ADMIN — POTASSIUM CHLORIDE 40 MEQ: 1500 TABLET, EXTENDED RELEASE ORAL at 12:55

## 2023-07-06 ASSESSMENT — PAIN SCALES - GENERAL
PAINLEVEL_OUTOF10: 0

## 2023-07-06 NOTE — CARE COORDINATION
7/6/23  Transition of Care update. Patient admitted for PE with new onset of A-Fib. Cardizem drip has been discontinued with metoprolol started. Heparin drip continues and awaiting plan for heparin transition. MBS complete with recommendations for Puree diet with nectar thick liquids. Patient is in normal sinus rhythm and on room air. Discharge goal is to return to [facility] when medically stable. [facility] is long term care and will not need a pre-cert. DARRION and destination updated. NO HENS assessment required. Ambulance started and on the soft chart. SW/CM to follow.     Electronically signed by SONIA Hernandez on 7/6/2023 at 11:48 AM

## 2023-07-06 NOTE — PLAN OF CARE
Problem: Discharge Planning  Goal: Discharge to home or other facility with appropriate resources  7/5/2023 2308 by Meaghan Carballo RN  Outcome: Progressing  7/5/2023 1530 by Melissa Roper RN  Outcome: Progressing     Problem: Safety - Adult  Goal: Free from fall injury  7/5/2023 2308 by Meaghan Carballo RN  Outcome: Progressing  7/5/2023 1530 by Melissa Roper RN  Outcome: Progressing     Problem: Skin/Tissue Integrity  Goal: Absence of new skin breakdown  Description: 1. Monitor for areas of redness and/or skin breakdown  2. Assess vascular access sites hourly  3. Every 4-6 hours minimum:  Change oxygen saturation probe site  4. Every 4-6 hours:  If on nasal continuous positive airway pressure, respiratory therapy assess nares and determine need for appliance change or resting period.   7/5/2023 2308 by Meaghan Carballo RN  Outcome: Progressing  7/5/2023 1530 by Melissa Roper RN  Outcome: Progressing     Problem: ABCDS Injury Assessment  Goal: Absence of physical injury  7/5/2023 2308 by Meaghan Carballo RN  Outcome: Progressing  7/5/2023 1530 by Melissa Roper RN  Outcome: Progressing     Problem: Pain  Goal: Verbalizes/displays adequate comfort level or baseline comfort level  7/5/2023 2308 by Meaghan Carballo RN  Outcome: Progressing  7/5/2023 1530 by Melissa Roper RN  Outcome: Progressing

## 2023-07-07 VITALS
RESPIRATION RATE: 18 BRPM | OXYGEN SATURATION: 94 % | WEIGHT: 168 LBS | SYSTOLIC BLOOD PRESSURE: 138 MMHG | HEART RATE: 68 BPM | BODY MASS INDEX: 29.77 KG/M2 | HEIGHT: 63 IN | DIASTOLIC BLOOD PRESSURE: 53 MMHG | TEMPERATURE: 97 F

## 2023-07-07 LAB
ANION GAP SERPL CALCULATED.3IONS-SCNC: 12 MMOL/L (ref 7–16)
ANISOCYTOSIS: ABNORMAL
BASOPHILS # BLD: 0 E9/L (ref 0–0.2)
BASOPHILS NFR BLD: 0.7 % (ref 0–2)
BUN SERPL-MCNC: 14 MG/DL (ref 6–23)
BURR CELLS: ABNORMAL
CALCIUM SERPL-MCNC: 8.9 MG/DL (ref 8.6–10.2)
CHLORIDE SERPL-SCNC: 114 MMOL/L (ref 98–107)
CO2 SERPL-SCNC: 18 MMOL/L (ref 22–29)
CREAT SERPL-MCNC: 0.6 MG/DL (ref 0.5–1)
EOSINOPHIL # BLD: 0 E9/L (ref 0.05–0.5)
EOSINOPHIL NFR BLD: 0 % (ref 0–6)
ERYTHROCYTE [DISTWIDTH] IN BLOOD BY AUTOMATED COUNT: 16.9 FL (ref 11.5–15)
GLUCOSE SERPL-MCNC: 121 MG/DL (ref 74–99)
HCT VFR BLD AUTO: 37.2 % (ref 34–48)
HGB BLD-MCNC: 11.9 G/DL (ref 11.5–15.5)
LYMPHOCYTES # BLD: 1.06 E9/L (ref 1.5–4)
LYMPHOCYTES NFR BLD: 6.1 % (ref 20–42)
MCH RBC QN AUTO: 29.4 PG (ref 26–35)
MCHC RBC AUTO-ENTMCNC: 32 % (ref 32–34.5)
MCV RBC AUTO: 91.9 FL (ref 80–99.9)
METAMYELOCYTES NFR BLD MANUAL: 1.7 % (ref 0–1)
MONOCYTES # BLD: 0.71 E9/L (ref 0.1–0.95)
MONOCYTES NFR BLD: 4.4 % (ref 2–12)
MYELOCYTES NFR BLD MANUAL: 1.7 % (ref 0–0)
NEUTROPHILS # BLD: 15.93 E9/L (ref 1.8–7.3)
NEUTS SEG NFR BLD: 86.1 % (ref 43–80)
NRBC BLD-RTO: 0.9 /100 WBC
OVALOCYTES: ABNORMAL
PLATELET # BLD AUTO: 390 E9/L (ref 130–450)
PMV BLD AUTO: 10.6 FL (ref 7–12)
POIKILOCYTES: ABNORMAL
POLYCHROMASIA: ABNORMAL
POTASSIUM SERPL-SCNC: 3.9 MMOL/L (ref 3.5–5)
RBC # BLD AUTO: 4.05 E12/L (ref 3.5–5.5)
SODIUM SERPL-SCNC: 144 MMOL/L (ref 132–146)
WBC # BLD: 17.7 E9/L (ref 4.5–11.5)

## 2023-07-07 PROCEDURE — 6370000000 HC RX 637 (ALT 250 FOR IP)

## 2023-07-07 PROCEDURE — 6370000000 HC RX 637 (ALT 250 FOR IP): Performed by: INTERNAL MEDICINE

## 2023-07-07 PROCEDURE — 6370000000 HC RX 637 (ALT 250 FOR IP): Performed by: NURSE PRACTITIONER

## 2023-07-07 PROCEDURE — 85025 COMPLETE CBC W/AUTO DIFF WBC: CPT

## 2023-07-07 PROCEDURE — 2580000003 HC RX 258

## 2023-07-07 PROCEDURE — 80048 BASIC METABOLIC PNL TOTAL CA: CPT

## 2023-07-07 RX ADMIN — DOCUSATE SODIUM 100 MG: 100 CAPSULE, LIQUID FILLED ORAL at 08:55

## 2023-07-07 RX ADMIN — METOPROLOL SUCCINATE 50 MG: 50 TABLET, EXTENDED RELEASE ORAL at 08:55

## 2023-07-07 RX ADMIN — APIXABAN 10 MG: 5 TABLET, FILM COATED ORAL at 08:55

## 2023-07-07 RX ADMIN — Medication 2000 UNITS: at 08:55

## 2023-07-07 RX ADMIN — ASPIRIN 81 MG 81 MG: 81 TABLET ORAL at 08:54

## 2023-07-07 RX ADMIN — Medication 10 ML: at 08:55

## 2023-07-07 RX ADMIN — DIVALPROEX SODIUM 250 MG: 250 TABLET, DELAYED RELEASE ORAL at 08:54

## 2023-07-07 ASSESSMENT — PAIN SCALES - GENERAL: PAINLEVEL_OUTOF10: 0

## 2023-07-07 NOTE — CARE COORDINATION
7/7/23  Transition of Care update. Patient admitted for PE with new onset of A-Fib. Patient will be medically stable for discharge today per attending. Patient will return to [facility] with Glorianne Line providing ambulance transport at 11:00am.  Family was updated as well as nursing and the facility liaison . Ambulance form updated on the soft chart. DARRION and destiantion complete and no HENS or pre-cert required. SW/VIOLA to follow.     Electronically signed by SONIA Chavarria on 7/7/2023 at 9:11 AM

## 2023-07-07 NOTE — PATIENT CARE CONFERENCE
[facility] Quality Flow/Interdisciplinary Rounds Progress Note        Quality Flow Rounds held on July 7, 2023    Disciplines Attending:  Bedside Nurse, , , and Nursing Unit Leadership    Ely Abreu was admitted on 7/1/2023 11:28 PM    Anticipated Discharge Date:       Disposition:    Boy Score:  Boy Scale Score: 12    Readmission Risk              Risk of Unplanned Readmission:  12           Discussed patient goal for the day, patient clinical progression, and barriers to discharge.   The following Goal(s) of the Day/Commitment(s) have been identified:  Discharge - Obtain Order if ok with ID      Arnulfo Katz RN  July 7, 2023

## 2023-07-09 LAB
BACTERIA BLD CULT ORG #2: NORMAL
BACTERIA BLD CULT: NORMAL

## 2023-09-18 ENCOUNTER — HOSPITAL ENCOUNTER (INPATIENT)
Age: 83
LOS: 17 days | Discharge: OTHER INSTITUTION | End: 2023-10-05
Attending: EMERGENCY MEDICINE | Admitting: INTERNAL MEDICINE
Payer: MEDICARE

## 2023-09-18 ENCOUNTER — APPOINTMENT (OUTPATIENT)
Dept: CT IMAGING | Age: 83
End: 2023-09-18
Payer: MEDICARE

## 2023-09-18 ENCOUNTER — APPOINTMENT (OUTPATIENT)
Dept: GENERAL RADIOLOGY | Age: 83
End: 2023-09-18
Payer: MEDICARE

## 2023-09-18 DIAGNOSIS — J90 PLEURAL EFFUSION: ICD-10-CM

## 2023-09-18 DIAGNOSIS — A41.9 SEPTICEMIA (HCC): ICD-10-CM

## 2023-09-18 DIAGNOSIS — J94.8 HYDROPNEUMOTHORAX: ICD-10-CM

## 2023-09-18 DIAGNOSIS — J90 PLEURAL EFFUSION ON RIGHT: ICD-10-CM

## 2023-09-18 DIAGNOSIS — J86.9 EMPYEMA OF LUNG (HCC): Primary | ICD-10-CM

## 2023-09-18 LAB
ALBUMIN SERPL-MCNC: 2.4 G/DL (ref 3.5–5.2)
ALP SERPL-CCNC: 139 U/L (ref 35–104)
ALT SERPL-CCNC: 7 U/L (ref 0–32)
ANION GAP SERPL CALCULATED.3IONS-SCNC: 15 MMOL/L (ref 7–16)
AST SERPL-CCNC: 14 U/L (ref 0–31)
B PARAP IS1001 DNA NPH QL NAA+NON-PROBE: NOT DETECTED
B PERT DNA SPEC QL NAA+PROBE: NOT DETECTED
B.E.: -3.2 MMOL/L (ref -3–3)
BASOPHILS # BLD: 0.04 K/UL (ref 0–0.2)
BASOPHILS NFR BLD: 0 % (ref 0–2)
BILIRUB SERPL-MCNC: 0.8 MG/DL (ref 0–1.2)
BNP SERPL-MCNC: 2338 PG/ML (ref 0–450)
BUN SERPL-MCNC: 24 MG/DL (ref 6–23)
C PNEUM DNA NPH QL NAA+NON-PROBE: NOT DETECTED
CALCIUM SERPL-MCNC: 9 MG/DL (ref 8.6–10.2)
CHLORIDE SERPL-SCNC: 105 MMOL/L (ref 98–107)
CO2 SERPL-SCNC: 21 MMOL/L (ref 22–29)
COHB: 0.5 % (ref 0–1.5)
CREAT SERPL-MCNC: 1 MG/DL (ref 0.5–1)
CRITICAL: ABNORMAL
D DIMER: 735 NG/ML DDU (ref 0–232)
DATE ANALYZED: ABNORMAL
DATE OF COLLECTION: ABNORMAL
EKG ATRIAL RATE: 100 BPM
EKG P AXIS: 17 DEGREES
EKG P-R INTERVAL: 128 MS
EKG Q-T INTERVAL: 358 MS
EKG QRS DURATION: 92 MS
EKG QTC CALCULATION (BAZETT): 461 MS
EKG R AXIS: -18 DEGREES
EKG T AXIS: 28 DEGREES
EKG VENTRICULAR RATE: 100 BPM
EOSINOPHIL # BLD: 0 K/UL (ref 0.05–0.5)
EOSINOPHILS RELATIVE PERCENT: 0 % (ref 0–6)
ERYTHROCYTE [DISTWIDTH] IN BLOOD BY AUTOMATED COUNT: 17.7 % (ref 11.5–15)
FLUAV RNA NPH QL NAA+NON-PROBE: NOT DETECTED
FLUBV RNA NPH QL NAA+NON-PROBE: NOT DETECTED
GFR SERPL CREATININE-BSD FRML MDRD: 58 ML/MIN/1.73M2
GLUCOSE SERPL-MCNC: 139 MG/DL (ref 74–99)
HADV DNA NPH QL NAA+NON-PROBE: NOT DETECTED
HCO3: 20.3 MMOL/L (ref 22–26)
HCOV 229E RNA NPH QL NAA+NON-PROBE: NOT DETECTED
HCOV HKU1 RNA NPH QL NAA+NON-PROBE: NOT DETECTED
HCOV NL63 RNA NPH QL NAA+NON-PROBE: NOT DETECTED
HCOV OC43 RNA NPH QL NAA+NON-PROBE: NOT DETECTED
HCT VFR BLD AUTO: 34.2 % (ref 34–48)
HGB BLD-MCNC: 10.3 G/DL (ref 11.5–15.5)
HHB: 0.9 % (ref 0–5)
HMPV RNA NPH QL NAA+NON-PROBE: NOT DETECTED
HPIV1 RNA NPH QL NAA+NON-PROBE: NOT DETECTED
HPIV2 RNA NPH QL NAA+NON-PROBE: NOT DETECTED
HPIV3 RNA NPH QL NAA+NON-PROBE: NOT DETECTED
HPIV4 RNA NPH QL NAA+NON-PROBE: NOT DETECTED
IMM GRANULOCYTES # BLD AUTO: 0.21 K/UL (ref 0–0.58)
IMM GRANULOCYTES NFR BLD: 1 % (ref 0–5)
LAB: ABNORMAL
LACTATE BLDV-SCNC: 2.9 MMOL/L (ref 0.5–1.9)
LACTATE BLDV-SCNC: 3.2 MMOL/L (ref 0.5–1.9)
LACTATE BLDV-SCNC: 4 MMOL/L (ref 0.5–2.2)
LYMPHOCYTES NFR BLD: 2.16 K/UL (ref 1.5–4)
LYMPHOCYTES RELATIVE PERCENT: 9 % (ref 20–42)
Lab: ABNORMAL
M PNEUMO DNA NPH QL NAA+NON-PROBE: NOT DETECTED
MCH RBC QN AUTO: 27.5 PG (ref 26–35)
MCHC RBC AUTO-ENTMCNC: 30.1 G/DL (ref 32–34.5)
MCV RBC AUTO: 91.4 FL (ref 80–99.9)
METHB: 0.3 % (ref 0–1.5)
MODE: ABNORMAL
MONOCYTES NFR BLD: 2.32 K/UL (ref 0.1–0.95)
MONOCYTES NFR BLD: 9 % (ref 2–12)
NEUTROPHILS NFR BLD: 81 % (ref 43–80)
NEUTS SEG NFR BLD: 19.96 K/UL (ref 1.8–7.3)
O2 CONTENT: 13.2 ML/DL
O2 SATURATION: 99.1 % (ref 92–98.5)
O2HB: 98.3 % (ref 94–97)
OPERATOR ID: 5100
PATIENT TEMP: 37 C
PCO2: 30.6 MMHG (ref 35–45)
PH BLOOD GAS: 7.44 (ref 7.35–7.45)
PLATELET # BLD AUTO: 697 K/UL (ref 130–450)
PMV BLD AUTO: 8.8 FL (ref 7–12)
PO2: 172.8 MMHG (ref 75–100)
POTASSIUM SERPL-SCNC: 4.1 MMOL/L (ref 3.5–5)
PROT SERPL-MCNC: 6.9 G/DL (ref 6.4–8.3)
RBC # BLD AUTO: 3.74 M/UL (ref 3.5–5.5)
RBC # BLD: ABNORMAL 10*6/UL
RSV RNA NPH QL NAA+NON-PROBE: NOT DETECTED
RV+EV RNA NPH QL NAA+NON-PROBE: NOT DETECTED
SARS-COV-2 RNA NPH QL NAA+NON-PROBE: NOT DETECTED
SODIUM SERPL-SCNC: 141 MMOL/L (ref 132–146)
SOURCE, BLOOD GAS: ABNORMAL
SPECIMEN DESCRIPTION: NORMAL
THB: 9.3 G/DL (ref 11.5–16.5)
TIME ANALYZED: 1246
TROPONIN I SERPL HS-MCNC: 32 NG/L (ref 0–9)
TROPONIN I SERPL HS-MCNC: 54 NG/L (ref 0–9)
WBC OTHER # BLD: 24.7 K/UL (ref 4.5–11.5)

## 2023-09-18 PROCEDURE — 80053 COMPREHEN METABOLIC PANEL: CPT

## 2023-09-18 PROCEDURE — 85025 COMPLETE CBC W/AUTO DIFF WBC: CPT

## 2023-09-18 PROCEDURE — 93005 ELECTROCARDIOGRAM TRACING: CPT

## 2023-09-18 PROCEDURE — 96361 HYDRATE IV INFUSION ADD-ON: CPT

## 2023-09-18 PROCEDURE — 2580000003 HC RX 258: Performed by: FAMILY MEDICINE

## 2023-09-18 PROCEDURE — 83880 ASSAY OF NATRIURETIC PEPTIDE: CPT

## 2023-09-18 PROCEDURE — 86900 BLOOD TYPING SEROLOGIC ABO: CPT

## 2023-09-18 PROCEDURE — 96365 THER/PROPH/DIAG IV INF INIT: CPT

## 2023-09-18 PROCEDURE — 70450 CT HEAD/BRAIN W/O DYE: CPT

## 2023-09-18 PROCEDURE — 85379 FIBRIN DEGRADATION QUANT: CPT

## 2023-09-18 PROCEDURE — 86901 BLOOD TYPING SEROLOGIC RH(D): CPT

## 2023-09-18 PROCEDURE — 82805 BLOOD GASES W/O2 SATURATION: CPT

## 2023-09-18 PROCEDURE — 83605 ASSAY OF LACTIC ACID: CPT

## 2023-09-18 PROCEDURE — 2000000000 HC ICU R&B

## 2023-09-18 PROCEDURE — 84484 ASSAY OF TROPONIN QUANT: CPT

## 2023-09-18 PROCEDURE — 93010 ELECTROCARDIOGRAM REPORT: CPT | Performed by: INTERNAL MEDICINE

## 2023-09-18 PROCEDURE — 0202U NFCT DS 22 TRGT SARS-COV-2: CPT

## 2023-09-18 PROCEDURE — 71045 X-RAY EXAM CHEST 1 VIEW: CPT

## 2023-09-18 PROCEDURE — 2580000003 HC RX 258: Performed by: EMERGENCY MEDICINE

## 2023-09-18 PROCEDURE — 87040 BLOOD CULTURE FOR BACTERIA: CPT

## 2023-09-18 PROCEDURE — 2500000003 HC RX 250 WO HCPCS: Performed by: EMERGENCY MEDICINE

## 2023-09-18 PROCEDURE — 96366 THER/PROPH/DIAG IV INF ADDON: CPT

## 2023-09-18 PROCEDURE — 36415 COLL VENOUS BLD VENIPUNCTURE: CPT

## 2023-09-18 PROCEDURE — 86850 RBC ANTIBODY SCREEN: CPT

## 2023-09-18 PROCEDURE — 99285 EMERGENCY DEPT VISIT HI MDM: CPT

## 2023-09-18 RX ORDER — DOCUSATE SODIUM 100 MG/1
100 CAPSULE, LIQUID FILLED ORAL 2 TIMES DAILY
Status: DISCONTINUED | OUTPATIENT
Start: 2023-09-18 | End: 2023-09-20

## 2023-09-18 RX ORDER — FOLIC ACID 1 MG/1
1 TABLET ORAL DAILY
COMMUNITY

## 2023-09-18 RX ORDER — SODIUM CHLORIDE 9 MG/ML
INJECTION, SOLUTION INTRAVENOUS CONTINUOUS
Status: DISCONTINUED | OUTPATIENT
Start: 2023-09-18 | End: 2023-09-19

## 2023-09-18 RX ORDER — ACETAMINOPHEN 650 MG/1
650 SUPPOSITORY RECTAL EVERY 6 HOURS PRN
Status: DISCONTINUED | OUTPATIENT
Start: 2023-09-18 | End: 2023-10-02 | Stop reason: SDUPTHER

## 2023-09-18 RX ORDER — POLYETHYLENE GLYCOL 3350 17 G/17G
17 POWDER, FOR SOLUTION ORAL DAILY PRN
Status: DISCONTINUED | OUTPATIENT
Start: 2023-09-18 | End: 2023-09-21

## 2023-09-18 RX ORDER — ACETAMINOPHEN 325 MG/1
650 TABLET ORAL EVERY 6 HOURS PRN
COMMUNITY

## 2023-09-18 RX ORDER — MEMANTINE HYDROCHLORIDE 10 MG/1
10 TABLET ORAL 2 TIMES DAILY
COMMUNITY

## 2023-09-18 RX ORDER — ASCORBIC ACID 500 MG
1000 TABLET ORAL DAILY
COMMUNITY

## 2023-09-18 RX ORDER — DIVALPROEX SODIUM 250 MG/1
250 TABLET, DELAYED RELEASE ORAL 3 TIMES DAILY
Status: DISCONTINUED | OUTPATIENT
Start: 2023-09-18 | End: 2023-09-20

## 2023-09-18 RX ORDER — MEMANTINE HYDROCHLORIDE 10 MG/1
10 TABLET ORAL 2 TIMES DAILY
Status: DISCONTINUED | OUTPATIENT
Start: 2023-09-18 | End: 2023-10-05 | Stop reason: HOSPADM

## 2023-09-18 RX ORDER — MIRTAZAPINE 15 MG/1
30 TABLET, FILM COATED ORAL NIGHTLY
Status: DISCONTINUED | OUTPATIENT
Start: 2023-09-18 | End: 2023-10-05 | Stop reason: HOSPADM

## 2023-09-18 RX ORDER — SODIUM CHLORIDE 9 MG/ML
INJECTION, SOLUTION INTRAVENOUS PRN
Status: DISCONTINUED | OUTPATIENT
Start: 2023-09-18 | End: 2023-10-05 | Stop reason: HOSPADM

## 2023-09-18 RX ORDER — ONDANSETRON 4 MG/1
4 TABLET, ORALLY DISINTEGRATING ORAL EVERY 8 HOURS PRN
Status: DISCONTINUED | OUTPATIENT
Start: 2023-09-18 | End: 2023-09-19

## 2023-09-18 RX ORDER — DONEPEZIL HYDROCHLORIDE 5 MG/1
10 TABLET, FILM COATED ORAL NIGHTLY
Status: DISCONTINUED | OUTPATIENT
Start: 2023-09-18 | End: 2023-10-05 | Stop reason: HOSPADM

## 2023-09-18 RX ORDER — METOPROLOL SUCCINATE 50 MG/1
50 TABLET, EXTENDED RELEASE ORAL DAILY
Status: DISCONTINUED | OUTPATIENT
Start: 2023-09-18 | End: 2023-09-22

## 2023-09-18 RX ORDER — ATORVASTATIN CALCIUM 10 MG/1
10 TABLET, FILM COATED ORAL DAILY
Status: DISCONTINUED | OUTPATIENT
Start: 2023-09-18 | End: 2023-10-05 | Stop reason: HOSPADM

## 2023-09-18 RX ORDER — ACETAMINOPHEN 325 MG/1
650 TABLET ORAL EVERY 6 HOURS PRN
Status: DISCONTINUED | OUTPATIENT
Start: 2023-09-18 | End: 2023-10-02 | Stop reason: SDUPTHER

## 2023-09-18 RX ORDER — FERROUS SULFATE 325(65) MG
325 TABLET ORAL
COMMUNITY

## 2023-09-18 RX ORDER — ONDANSETRON 2 MG/ML
4 INJECTION INTRAMUSCULAR; INTRAVENOUS EVERY 6 HOURS PRN
Status: DISCONTINUED | OUTPATIENT
Start: 2023-09-18 | End: 2023-09-19

## 2023-09-18 RX ORDER — SODIUM CHLORIDE 0.9 % (FLUSH) 0.9 %
5-40 SYRINGE (ML) INJECTION EVERY 12 HOURS SCHEDULED
Status: DISCONTINUED | OUTPATIENT
Start: 2023-09-18 | End: 2023-10-05 | Stop reason: HOSPADM

## 2023-09-18 RX ORDER — SODIUM CHLORIDE 0.9 % (FLUSH) 0.9 %
10 SYRINGE (ML) INJECTION PRN
Status: DISCONTINUED | OUTPATIENT
Start: 2023-09-18 | End: 2023-10-05 | Stop reason: HOSPADM

## 2023-09-18 RX ORDER — 0.9 % SODIUM CHLORIDE 0.9 %
1000 INTRAVENOUS SOLUTION INTRAVENOUS ONCE
Status: COMPLETED | OUTPATIENT
Start: 2023-09-18 | End: 2023-09-18

## 2023-09-18 RX ORDER — ASPIRIN 81 MG/1
81 TABLET, CHEWABLE ORAL DAILY
Status: DISCONTINUED | OUTPATIENT
Start: 2023-09-18 | End: 2023-10-05 | Stop reason: HOSPADM

## 2023-09-18 RX ADMIN — SODIUM CHLORIDE: 9 INJECTION, SOLUTION INTRAVENOUS at 12:38

## 2023-09-18 RX ADMIN — SODIUM CHLORIDE: 9 INJECTION, SOLUTION INTRAVENOUS at 19:28

## 2023-09-18 RX ADMIN — SODIUM CHLORIDE, PRESERVATIVE FREE 10 ML: 5 INJECTION INTRAVENOUS at 22:09

## 2023-09-18 RX ADMIN — DEXTROSE MONOHYDRATE 5 MG/HR: 50 INJECTION, SOLUTION INTRAVENOUS at 20:37

## 2023-09-18 RX ADMIN — SODIUM CHLORIDE 1000 ML: 9 INJECTION, SOLUTION INTRAVENOUS at 12:37

## 2023-09-18 NOTE — ED NOTES
Cardizem out of stock in both [facility]'s. Pharmacy called.  Pharmacy will be bringing Cardizem to this YUSUF Butts, YUSUF  09/18/23 1596

## 2023-09-18 NOTE — ED PROVIDER NOTES
Minda        Pt Name: Cindie Kocher  MRN: [id number]  [address] 1940  Date of evaluation: 9/18/2023  Provider: Guillermo Brennan DO  PCP: Ilya Lawson III, DO  Note Started: 11:07 AM EDT 9/18/23    CHIEF COMPLAINT       Chief Complaint   Patient presents with    Shortness of Breath     PT SENT IN FROM NH FOR SOB. PT NOT NORMALLY ON OXYGEN. PT WAS 80-90% AT FACILITY        HISTORY OF PRESENT ILLNESS: 1 or more Elements            Ely Thompson is a 79 y.o. female who presents for SOB. Pt has hx of dementia. Pt was brought here from nursing home in [place] because O2 sat this morning was 80-90%. At baseline, pt is not normally on oxygen. In room, pt is on 6 L HFNC. EMS technician reports pt was given 1 dose Duoneb at facility, and 1 dose Duoneb and 1 dose solumedrol in ambulance. She reports pt is oriented x 1, appears lethargic, and not responding to questions appropriately. At baseline, pt can converse appropriately. She is on a blood thinner. She reports pt is DNR-CCA. Pt is not able to answer questions regarding ROS. Nursing Notes were all reviewed and agreed with or any disagreements were addressed in the HPI. REVIEW OF EXTERNAL NOTE :           Chart Review/External Note Review    Last Echo reviewed by Me:  Lab Results   Component Value Date    LVEF 70 07/03/2023             Controlled Substance Monitoring:    Acute and Chronic Pain Monitoring:        No data to display                    REVIEW OF SYSTEMS :      Positives and Pertinent negatives as per HPI. SURGICAL HISTORY   No past surgical history on file.     CURRENTMEDICATIONS       Previous Medications    APIXABAN (ELIQUIS) 5 MG TABS TABLET    Take 2 tablets by mouth 2 times daily for 13 doses    APIXABAN (ELIQUIS) 5 MG TABS TABLET    Take 1 tablet by mouth 2 times daily    ASPIRIN 81 MG CHEWABLE TABLET    Take 1 tablet by mouth

## 2023-09-19 ENCOUNTER — APPOINTMENT (OUTPATIENT)
Dept: CT IMAGING | Age: 83
End: 2023-09-19
Payer: MEDICARE

## 2023-09-19 PROBLEM — J90 PLEURAL EFFUSION: Status: ACTIVE | Noted: 2023-09-18

## 2023-09-19 PROBLEM — J86.9 EMPYEMA (HCC): Status: ACTIVE | Noted: 2023-09-19

## 2023-09-19 LAB
ABO + RH BLD: NORMAL
ALBUMIN SERPL-MCNC: 1.9 G/DL (ref 3.5–5.2)
ALLEN TEST: POSITIVE
ALP SERPL-CCNC: 119 U/L (ref 35–104)
ALT SERPL-CCNC: 6 U/L (ref 0–32)
ANION GAP SERPL CALCULATED.3IONS-SCNC: 10 MMOL/L (ref 7–16)
ANION GAP SERPL CALCULATED.3IONS-SCNC: 10 MMOL/L (ref 7–16)
ARM BAND NUMBER: NORMAL
AST SERPL-CCNC: 15 U/L (ref 0–31)
BASOPHILS # BLD: 0 K/UL (ref 0–0.2)
BASOPHILS NFR BLD: 0 % (ref 0–2)
BILIRUB SERPL-MCNC: 0.7 MG/DL (ref 0–1.2)
BLOOD BANK SAMPLE EXPIRATION: NORMAL
BLOOD GROUP ANTIBODIES SERPL: NEGATIVE
BUN SERPL-MCNC: 20 MG/DL (ref 6–23)
BUN SERPL-MCNC: 23 MG/DL (ref 6–23)
CALCIUM SERPL-MCNC: 8.1 MG/DL (ref 8.6–10.2)
CALCIUM SERPL-MCNC: 8.8 MG/DL (ref 8.6–10.2)
CHLORIDE SERPL-SCNC: 113 MMOL/L (ref 98–107)
CHLORIDE SERPL-SCNC: 117 MMOL/L (ref 98–107)
CO2 SERPL-SCNC: 19 MMOL/L (ref 22–29)
CO2 SERPL-SCNC: 20 MMOL/L (ref 22–29)
CREAT SERPL-MCNC: 0.5 MG/DL (ref 0.5–1)
CREAT SERPL-MCNC: 0.6 MG/DL (ref 0.5–1)
EOSINOPHIL # BLD: 0 K/UL (ref 0.05–0.5)
EOSINOPHILS RELATIVE PERCENT: 0 % (ref 0–6)
ERYTHROCYTE [DISTWIDTH] IN BLOOD BY AUTOMATED COUNT: 17.2 % (ref 11.5–15)
GFR SERPL CREATININE-BSD FRML MDRD: >60 ML/MIN/1.73M2
GFR SERPL CREATININE-BSD FRML MDRD: >60 ML/MIN/1.73M2
GLUCOSE BLD-MCNC: 119 MG/DL (ref 74–99)
GLUCOSE BLD-MCNC: 146 MG/DL (ref 74–99)
GLUCOSE SERPL-MCNC: 116 MG/DL (ref 74–99)
GLUCOSE SERPL-MCNC: 163 MG/DL (ref 74–99)
HCT VFR BLD AUTO: 28.3 % (ref 34–48)
HCT VFR BLD AUTO: 29.6 % (ref 34–48)
HGB BLD-MCNC: 8.5 G/DL (ref 11.5–15.5)
HGB BLD-MCNC: 8.9 G/DL (ref 11.5–15.5)
INR PPP: 2.1
LYMPHOCYTES NFR BLD: 1.86 K/UL (ref 1.5–4)
LYMPHOCYTES RELATIVE PERCENT: 8 % (ref 20–42)
MAGNESIUM SERPL-MCNC: 2.1 MG/DL (ref 1.6–2.6)
MCH RBC QN AUTO: 27.6 PG (ref 26–35)
MCHC RBC AUTO-ENTMCNC: 30.1 G/DL (ref 32–34.5)
MCV RBC AUTO: 91.9 FL (ref 80–99.9)
MONOCYTES NFR BLD: 1.45 K/UL (ref 0.1–0.95)
MONOCYTES NFR BLD: 6 % (ref 2–12)
NEGATIVE BASE EXCESS, ART: 5 MMOL/L
NEUTROPHILS NFR BLD: 86 % (ref 43–80)
NEUTS SEG NFR BLD: 20.49 K/UL (ref 1.8–7.3)
O2 DELIVERY DEVICE: ABNORMAL
PHOSPHATE SERPL-MCNC: 2.7 MG/DL (ref 2.5–4.5)
PLATELET # BLD AUTO: 636 K/UL (ref 130–450)
PMV BLD AUTO: 8.8 FL (ref 7–12)
POC HCO3: 19 MMOL/L (ref 22–26)
POC O2 SATURATION: 95.3 % (ref 92–98.5)
POC PCO2: 30.3 MM HG (ref 35–45)
POC PH: 7.4 (ref 7.35–7.45)
POC PO2: 75.9 MM HG (ref 60–80)
POTASSIUM SERPL-SCNC: 3.6 MMOL/L (ref 3.5–5)
POTASSIUM SERPL-SCNC: 3.7 MMOL/L (ref 3.5–5)
PROT SERPL-MCNC: 5.8 G/DL (ref 6.4–8.3)
PROTHROMBIN TIME: 23 SEC (ref 9.3–12.4)
RBC # BLD AUTO: 3.22 M/UL (ref 3.5–5.5)
RBC # BLD: ABNORMAL 10*6/UL
SAMPLE SITE: ABNORMAL
SODIUM SERPL-SCNC: 142 MMOL/L (ref 132–146)
SODIUM SERPL-SCNC: 147 MMOL/L (ref 132–146)
T4 FREE SERPL-MCNC: 1.1 NG/DL (ref 0.9–1.7)
TSH SERPL DL<=0.05 MIU/L-ACNC: 5.07 UIU/ML (ref 0.27–4.2)
WBC OTHER # BLD: 23.8 K/UL (ref 4.5–11.5)

## 2023-09-19 PROCEDURE — 83735 ASSAY OF MAGNESIUM: CPT

## 2023-09-19 PROCEDURE — 6370000000 HC RX 637 (ALT 250 FOR IP): Performed by: FAMILY MEDICINE

## 2023-09-19 PROCEDURE — 36556 INSERT NON-TUNNEL CV CATH: CPT

## 2023-09-19 PROCEDURE — 93005 ELECTROCARDIOGRAM TRACING: CPT

## 2023-09-19 PROCEDURE — 2580000003 HC RX 258: Performed by: INTERNAL MEDICINE

## 2023-09-19 PROCEDURE — A4216 STERILE WATER/SALINE, 10 ML: HCPCS | Performed by: STUDENT IN AN ORGANIZED HEALTH CARE EDUCATION/TRAINING PROGRAM

## 2023-09-19 PROCEDURE — 85018 HEMOGLOBIN: CPT

## 2023-09-19 PROCEDURE — 96375 TX/PRO/DX INJ NEW DRUG ADDON: CPT

## 2023-09-19 PROCEDURE — 82803 BLOOD GASES ANY COMBINATION: CPT

## 2023-09-19 PROCEDURE — P9047 ALBUMIN (HUMAN), 25%, 50ML: HCPCS | Performed by: STUDENT IN AN ORGANIZED HEALTH CARE EDUCATION/TRAINING PROGRAM

## 2023-09-19 PROCEDURE — 80053 COMPREHEN METABOLIC PANEL: CPT

## 2023-09-19 PROCEDURE — 80048 BASIC METABOLIC PNL TOTAL CA: CPT

## 2023-09-19 PROCEDURE — 6360000002 HC RX W HCPCS: Performed by: INTERNAL MEDICINE

## 2023-09-19 PROCEDURE — 2500000003 HC RX 250 WO HCPCS: Performed by: STUDENT IN AN ORGANIZED HEALTH CARE EDUCATION/TRAINING PROGRAM

## 2023-09-19 PROCEDURE — 86923 COMPATIBILITY TEST ELECTRIC: CPT

## 2023-09-19 PROCEDURE — 85014 HEMATOCRIT: CPT

## 2023-09-19 PROCEDURE — 85025 COMPLETE CBC W/AUTO DIFF WBC: CPT

## 2023-09-19 PROCEDURE — 86901 BLOOD TYPING SEROLOGIC RH(D): CPT

## 2023-09-19 PROCEDURE — 2580000003 HC RX 258: Performed by: FAMILY MEDICINE

## 2023-09-19 PROCEDURE — 86900 BLOOD TYPING SEROLOGIC ABO: CPT

## 2023-09-19 PROCEDURE — 96368 THER/DIAG CONCURRENT INF: CPT

## 2023-09-19 PROCEDURE — 36592 COLLECT BLOOD FROM PICC: CPT

## 2023-09-19 PROCEDURE — 6360000004 HC RX CONTRAST MEDICATION: Performed by: RADIOLOGY

## 2023-09-19 PROCEDURE — 3E033XZ INTRODUCTION OF VASOPRESSOR INTO PERIPHERAL VEIN, PERCUTANEOUS APPROACH: ICD-10-PCS | Performed by: INTERNAL MEDICINE

## 2023-09-19 PROCEDURE — 71275 CT ANGIOGRAPHY CHEST: CPT

## 2023-09-19 PROCEDURE — 96367 TX/PROPH/DG ADDL SEQ IV INF: CPT

## 2023-09-19 PROCEDURE — 87086 URINE CULTURE/COLONY COUNT: CPT

## 2023-09-19 PROCEDURE — 99222 1ST HOSP IP/OBS MODERATE 55: CPT | Performed by: STUDENT IN AN ORGANIZED HEALTH CARE EDUCATION/TRAINING PROGRAM

## 2023-09-19 PROCEDURE — 96366 THER/PROPH/DIAG IV INF ADDON: CPT

## 2023-09-19 PROCEDURE — 82962 GLUCOSE BLOOD TEST: CPT

## 2023-09-19 PROCEDURE — 36600 WITHDRAWAL OF ARTERIAL BLOOD: CPT

## 2023-09-19 PROCEDURE — 86850 RBC ANTIBODY SCREEN: CPT

## 2023-09-19 PROCEDURE — 84100 ASSAY OF PHOSPHORUS: CPT

## 2023-09-19 PROCEDURE — 84443 ASSAY THYROID STIM HORMONE: CPT

## 2023-09-19 PROCEDURE — 2580000003 HC RX 258: Performed by: STUDENT IN AN ORGANIZED HEALTH CARE EDUCATION/TRAINING PROGRAM

## 2023-09-19 PROCEDURE — 87081 CULTURE SCREEN ONLY: CPT

## 2023-09-19 PROCEDURE — 84439 ASSAY OF FREE THYROXINE: CPT

## 2023-09-19 PROCEDURE — 85610 PROTHROMBIN TIME: CPT

## 2023-09-19 PROCEDURE — 6360000002 HC RX W HCPCS: Performed by: STUDENT IN AN ORGANIZED HEALTH CARE EDUCATION/TRAINING PROGRAM

## 2023-09-19 PROCEDURE — 80165 DIPROPYLACETIC ACID FREE: CPT

## 2023-09-19 PROCEDURE — 2000000000 HC ICU R&B

## 2023-09-19 PROCEDURE — 99223 1ST HOSP IP/OBS HIGH 75: CPT | Performed by: INTERNAL MEDICINE

## 2023-09-19 PROCEDURE — C9113 INJ PANTOPRAZOLE SODIUM, VIA: HCPCS | Performed by: STUDENT IN AN ORGANIZED HEALTH CARE EDUCATION/TRAINING PROGRAM

## 2023-09-19 PROCEDURE — 2700000000 HC OXYGEN THERAPY PER DAY

## 2023-09-19 RX ORDER — INSULIN LISPRO 100 [IU]/ML
0-4 INJECTION, SOLUTION INTRAVENOUS; SUBCUTANEOUS EVERY 4 HOURS
Status: DISCONTINUED | OUTPATIENT
Start: 2023-09-19 | End: 2023-09-19

## 2023-09-19 RX ORDER — ALBUMIN (HUMAN) 12.5 G/50ML
25 SOLUTION INTRAVENOUS ONCE
Status: COMPLETED | OUTPATIENT
Start: 2023-09-19 | End: 2023-09-19

## 2023-09-19 RX ORDER — DONEPEZIL HYDROCHLORIDE 5 MG/1
10 TABLET, FILM COATED ORAL NIGHTLY
Status: CANCELLED | OUTPATIENT
Start: 2023-09-19

## 2023-09-19 RX ORDER — POTASSIUM CHLORIDE 29.8 MG/ML
40 INJECTION INTRAVENOUS ONCE
Status: COMPLETED | OUTPATIENT
Start: 2023-09-19 | End: 2023-09-19

## 2023-09-19 RX ORDER — SODIUM CHLORIDE 9 MG/ML
INJECTION, SOLUTION INTRAVENOUS CONTINUOUS
Status: DISCONTINUED | OUTPATIENT
Start: 2023-09-19 | End: 2023-09-19

## 2023-09-19 RX ORDER — 0.9 % SODIUM CHLORIDE 0.9 %
1000 INTRAVENOUS SOLUTION INTRAVENOUS ONCE
Status: COMPLETED | OUTPATIENT
Start: 2023-09-19 | End: 2023-09-19

## 2023-09-19 RX ORDER — INSULIN LISPRO 100 [IU]/ML
0-4 INJECTION, SOLUTION INTRAVENOUS; SUBCUTANEOUS EVERY 6 HOURS
Status: DISCONTINUED | OUTPATIENT
Start: 2023-09-19 | End: 2023-10-05 | Stop reason: HOSPADM

## 2023-09-19 RX ORDER — METRONIDAZOLE 500 MG/100ML
500 INJECTION, SOLUTION INTRAVENOUS EVERY 8 HOURS
Status: DISCONTINUED | OUTPATIENT
Start: 2023-09-19 | End: 2023-09-19

## 2023-09-19 RX ORDER — POTASSIUM CHLORIDE 7.45 MG/ML
10 INJECTION INTRAVENOUS
Status: DISCONTINUED | OUTPATIENT
Start: 2023-09-19 | End: 2023-09-19

## 2023-09-19 RX ORDER — DEXTROSE, SODIUM CHLORIDE, SODIUM LACTATE, POTASSIUM CHLORIDE, AND CALCIUM CHLORIDE 5; .6; .31; .03; .02 G/100ML; G/100ML; G/100ML; G/100ML; G/100ML
INJECTION, SOLUTION INTRAVENOUS CONTINUOUS
Status: DISCONTINUED | OUTPATIENT
Start: 2023-09-19 | End: 2023-09-20

## 2023-09-19 RX ORDER — NOREPINEPHRINE BITARTRATE 0.06 MG/ML
1-100 INJECTION, SOLUTION INTRAVENOUS CONTINUOUS
Status: DISCONTINUED | OUTPATIENT
Start: 2023-09-19 | End: 2023-09-20

## 2023-09-19 RX ORDER — ALBUMIN (HUMAN) 12.5 G/50ML
25 SOLUTION INTRAVENOUS EVERY 8 HOURS
Status: DISCONTINUED | OUTPATIENT
Start: 2023-09-19 | End: 2023-09-19

## 2023-09-19 RX ORDER — DEXTROSE MONOHYDRATE 100 MG/ML
INJECTION, SOLUTION INTRAVENOUS CONTINUOUS PRN
Status: DISCONTINUED | OUTPATIENT
Start: 2023-09-19 | End: 2023-10-05 | Stop reason: HOSPADM

## 2023-09-19 RX ORDER — SODIUM CHLORIDE 9 MG/ML
INJECTION, SOLUTION INTRAVENOUS PRN
Status: DISCONTINUED | OUTPATIENT
Start: 2023-09-19 | End: 2023-09-20

## 2023-09-19 RX ORDER — SODIUM CHLORIDE 9 MG/ML
INJECTION, SOLUTION INTRAVENOUS PRN
Status: DISCONTINUED | OUTPATIENT
Start: 2023-09-19 | End: 2023-09-19

## 2023-09-19 RX ADMIN — ALBUMIN (HUMAN) 25 G: 0.25 INJECTION, SOLUTION INTRAVENOUS at 11:30

## 2023-09-19 RX ADMIN — DIVALPROEX SODIUM 250 MG: 250 TABLET, DELAYED RELEASE ORAL at 09:16

## 2023-09-19 RX ADMIN — MEMANTINE 10 MG: 10 TABLET ORAL at 09:16

## 2023-09-19 RX ADMIN — VANCOMYCIN HYDROCHLORIDE 1500 MG: 10 INJECTION, POWDER, LYOPHILIZED, FOR SOLUTION INTRAVENOUS at 03:03

## 2023-09-19 RX ADMIN — PIPERACILLIN AND TAZOBACTAM 3375 MG: 3; .375 INJECTION, POWDER, LYOPHILIZED, FOR SOLUTION INTRAVENOUS at 12:05

## 2023-09-19 RX ADMIN — WATER 50 MG: 1 INJECTION INTRAMUSCULAR; INTRAVENOUS; SUBCUTANEOUS at 21:00

## 2023-09-19 RX ADMIN — SODIUM CHLORIDE, PRESERVATIVE FREE 10 ML: 5 INJECTION INTRAVENOUS at 09:18

## 2023-09-19 RX ADMIN — IOPAMIDOL 75 ML: 755 INJECTION, SOLUTION INTRAVENOUS at 02:52

## 2023-09-19 RX ADMIN — SODIUM CHLORIDE 1000 ML: 9 INJECTION, SOLUTION INTRAVENOUS at 07:45

## 2023-09-19 RX ADMIN — Medication 5 MCG/MIN: at 01:42

## 2023-09-19 RX ADMIN — SODIUM CHLORIDE, PRESERVATIVE FREE 10 ML: 5 INJECTION INTRAVENOUS at 21:04

## 2023-09-19 RX ADMIN — WATER 50 MG: 1 INJECTION INTRAMUSCULAR; INTRAVENOUS; SUBCUTANEOUS at 14:14

## 2023-09-19 RX ADMIN — SODIUM CHLORIDE, PRESERVATIVE FREE 40 MG: 5 INJECTION INTRAVENOUS at 20:59

## 2023-09-19 RX ADMIN — SODIUM CHLORIDE, SODIUM LACTATE, POTASSIUM CHLORIDE, CALCIUM CHLORIDE AND DEXTROSE MONOHYDRATE: 5; 600; 310; 30; 20 INJECTION, SOLUTION INTRAVENOUS at 18:33

## 2023-09-19 RX ADMIN — WATER 100 MG: 1 INJECTION INTRAMUSCULAR; INTRAVENOUS; SUBCUTANEOUS at 02:13

## 2023-09-19 RX ADMIN — ATORVASTATIN CALCIUM 10 MG: 10 TABLET, FILM COATED ORAL at 09:10

## 2023-09-19 RX ADMIN — DOCUSATE SODIUM 100 MG: 100 CAPSULE, LIQUID FILLED ORAL at 09:11

## 2023-09-19 RX ADMIN — SODIUM CHLORIDE, PRESERVATIVE FREE 40 MG: 5 INJECTION INTRAVENOUS at 09:13

## 2023-09-19 RX ADMIN — WATER 50 MG: 1 INJECTION INTRAMUSCULAR; INTRAVENOUS; SUBCUTANEOUS at 09:11

## 2023-09-19 RX ADMIN — ALBUMIN (HUMAN) 25 G: 0.25 INJECTION, SOLUTION INTRAVENOUS at 09:10

## 2023-09-19 RX ADMIN — POTASSIUM CHLORIDE 40 MEQ: 29.8 INJECTION, SOLUTION INTRAVENOUS at 09:21

## 2023-09-19 RX ADMIN — PIPERACILLIN AND TAZOBACTAM 3375 MG: 3; .375 INJECTION, POWDER, LYOPHILIZED, FOR SOLUTION INTRAVENOUS at 21:04

## 2023-09-19 RX ADMIN — METRONIDAZOLE 500 MG: 500 INJECTION, SOLUTION INTRAVENOUS at 11:02

## 2023-09-19 RX ADMIN — CEFEPIME 2000 MG: 2 INJECTION, POWDER, FOR SOLUTION INTRAVENOUS at 02:11

## 2023-09-19 ASSESSMENT — PAIN SCALES - PAIN ASSESSMENT IN ADVANCED DEMENTIA (PAINAD)
CONSOLABILITY: 0
FACIALEXPRESSION: 0
TOTALSCORE: 0
BODYLANGUAGE: 0
CONSOLABILITY: 0
NEGVOCALIZATION: 0
BREATHING: 0
TOTALSCORE: 0
NEGVOCALIZATION: 0
BREATHING: 0
FACIALEXPRESSION: 0
BODYLANGUAGE: 0

## 2023-09-19 ASSESSMENT — PAIN SCALES - GENERAL: PAINLEVEL_OUTOF10: 0

## 2023-09-19 NOTE — ED NOTES
Patient was admitted to the hospital and while boarding physicians were called to bedside due to patient's hypotension. Dr. Farzana Tristan was made aware of patient's hypotension and a central line was placed under her supervision and patient was started on Levophed. Upon review of patient's lab work she will be started on vancomycin and cefepime at this time. She was also given a dose of Solu-Cortef. Patient will be sent for a CTA to rule out pulmonary embolus. Pending results of the CT scan she will be upgraded from the general floor to the ICU. Admitting team will also be updated on patient's deterioration and upgrade to the ICU. Porfirio Carter DO  Resident  09/19/23 9571      ATTENDING PROVIDER ATTESTATION:     I have personally performed and/or participated in the history, exam, medical decision making, and procedures and agree with all pertinent clinical information. I have also reviewed and agree with the past medical, family and social history unless otherwise noted. I have discussed this patient in detail with the resident, and provided the instruction and education regarding hypotension. My findings/Plan: I was the attending while patient had became hypotensive. Patient was initially seen by prior ED physician as well as another physician patient had central line placed under supervision by Dr. Farzana Tristan. I became involved the patient due to the fact the patient was hypotensive as well as was having some respiratory difficulty. Patient was also noted to be hypothermic. Patient had Sameer hugger placed. Patient initially admitted for pleural effusion. Patient here is awake and responsive. Patient was reportedly seen for shortness of breath was also noted to be hypoxic. Patient CODE STATUS is DNR CCA. Patient labs were obtained and white count was 24.7 hemoglobin 10.3 patient's BUN is 24 patient's proBNP was 2338 troponin was 54 lactic was 4 blood cultures were obtained on the patient.   Chest

## 2023-09-19 NOTE — CONSULTS
Department of Internal Medicine  Infectious Diseases   Consult Note      Reason for Consult: Empyema       Requesting Physician:  Dr Amina Neff:      Pt is a poor historian . This is an 79 yrs old female with hx dementia, a fib,  PE, HTN from nursing home presented to the ER with shortness of breath . She  did not have fever . She denied chest pain , cough . WBC was 24 K   CT scan of chest   - large right pleural effusion with partially loculated anterior   hydropneumothorax component? Empyema   Pt ws started on flagyl and cefepime   Vanco X 1 given   Past Medical History:      Stroke, Dementia, Seizure  disorder , A fib, PE    Past Surgical History:      No past surgical history on file.       Current Medications:      Current Facility-Administered Medications   Medication Dose Route Frequency Provider Last Rate Last Admin    norepinephrine (LEVOPHED) 16 mg in sodium chloride 0.9 % 250 mL infusion  1-100 mcg/min IntraVENous Continuous Kristen Parsons DO   Stopped at 09/19/23 0940    hydrocortisone sodium succinate PF (SOLU-CORTEF) 50 mg in sterile water 2 mL injection  50 mg IntraVENous Q6H Hillary Orona MD   50 mg at 09/19/23 0911    ceFEPIme (MAXIPIME) 2,000 mg in sodium chloride 0.9 % 100 mL IVPB ([id number])  2,000 mg IntraVENous Q12H Hillary Orona MD        metronidazole (FLAGYL) 500 mg in 0.9% NaCl 100 mL IVPB premix  500 mg IntraVENous Q8H Hillary Orona  mL/hr at 09/19/23 1102 500 mg at 09/19/23 1102    pantoprazole (PROTONIX) 40 mg in sodium chloride (PF) 0.9 % 10 mL injection  40 mg IntraVENous Q12H Hillary Orona MD   40 mg at 09/19/23 0913    potassium chloride 40 mEq in 100 mL IVPB (Central Line)  40 mEq IntraVENous Once Hillary Orona MD 25 mL/hr at 09/19/23 0921 40 mEq at 09/19/23 0921    albumin human 25% IV solution 25 g  25 g IntraVENous Once Hillary Orona MD        glucose chewable tablet 16 g  4 tablet Oral PRN Hillary Orona MD        dextrose bolus 10% 125 mL  125 mL IntraVENous PRN Vito Marc MD        Or    dextrose bolus 10% 250 mL  250 mL IntraVENous PRN Vito Marc MD        glucagon injection 1 mg  1 mg SubCUTAneous PRN Vito Marc MD        dextrose 10 % infusion   IntraVENous Continuous PRN Vito Marc MD        insulin lispro (HUMALOG) injection vial 0-4 Units  0-4 Units SubCUTAneous Q6H MD Libia Lo ON 9/20/2023] ceFAZolin (ANCEF) 2,000 mg in sterile water 20 mL IV syringe  2,000 mg IntraVENous On Call to [address] [place] [address]        [Held by provider] apixaban (ELIQUIS) tablet 5 mg  5 mg Oral BID DOMINIQUE Carrillo - CNP        [Held by provider] aspirin chewable tablet 81 mg  81 mg Oral Daily Anjelica Olguin, APRN - MEREDITH        atorvastatin (LIPITOR) tablet 10 mg  10 mg Oral Daily Anjelica Olguin APRN - CNP   10 mg at 09/19/23 0910    divalproex (DEPAKOTE) DR tablet 250 mg  250 mg Oral TID Anjelica Olguin APRN - CNP   250 mg at 09/19/23 0916    docusate sodium (COLACE) capsule 100 mg  100 mg Oral BID Anjelica Olguin APRN - CNP   100 mg at 09/19/23 0911    donepezil (ARICEPT) tablet 10 mg  10 mg Oral Nightly Anjelica Olguin, APRN - CNP        memantine [facility]) tablet 10 mg  10 mg Oral BID Anjelica Olguin APRN - CNP   10 mg at 09/19/23 0916    [Held by provider] metoprolol succinate (TOPROL XL) extended release tablet 50 mg  50 mg Oral Daily Anjelica Olguin APRN - CNP        mirtazapine (REMERON) tablet 30 mg  30 mg Oral Nightly Anjelica Olguin APRN - CNP        sodium chloride flush 0.9 % injection 5-40 mL  5-40 mL IntraVENous 2 times per day Anjelica Olguin APRN - CNP   10 mL at 09/19/23 0918    sodium chloride flush 0.9 % injection 10 mL  10 mL IntraVENous PRN Anjelica Olguin APRN - CNP        0.9 % sodium chloride infusion   IntraVENous PRN Anjelica Love Learn, APRN - CNP        polyethylene glycol (GLYCOLAX) packet 17 g  17 g Oral Daily PRN Anjelica Olguin, APRN - MEREDITH        acetaminophen (TYLENOL) tablet 650

## 2023-09-19 NOTE — ED NOTES
Central Line Placement Procedure Note    Indication: vascular access and centrally administered medications    Consent: Unable to be obtained due to the emergent nature of this procedure. Procedure: The patient was positioned appropriately and the skin over the right femoral vein was prepped with betadine and draped in a sterile fashion. Local anesthesia was obtained by infiltration using 1% Lidocaine without epinephrine. A large bore needle was used to identify the vein. A guide wire was then inserted into the vein through the needle. A triple lumen catheter was then inserted into the vessel over the guide wire using the Seldinger technique. All ports showed good, free flowing blood return and were flushed with saline solution. The catheter was then securely fastened to the skin with suture at 20 cm. Two sutures were placed into the kit included tube clamp, proximal eyelets, and a suture end from each of the securing sutures was extended around the catheter and tied to the proximal eyelets as an added measure to prevent dislodgement. An antibiotic disk was placed and the site was then covered with a sterile dressing. A post procedure X-ray was not indicated. The patient tolerated the procedure well.     Complications: None         Katerin Rendon DO  Resident  09/19/23 0157

## 2023-09-19 NOTE — ACP (ADVANCE CARE PLANNING)
Advance Care Planning   Healthcare Decision Maker:    Primary Decision Maker: Yvonne Chávez - Niece/Nephew - [phone]    Secondary Decision Maker: mundo desir - [facility] - [phone]    Click here to complete Healthcare Decision Makers including selection of the Healthcare Decision Maker Relationship (ie \"Primary\").

## 2023-09-19 NOTE — H&P
[facility]  History and Physical      CHIEF COMPLAINT:    Chief Complaint   Patient presents with    Shortness of Breath     PT SENT IN FROM NH FOR SOB. PT NOT NORMALLY ON OXYGEN. PT WAS 80-90% AT FACILITY         Patient of Bobby Henderson DO presents with:  Pleural effusion on right    History of Present Illness:   Patient is an 65-year-old female with a past medical history of atrial fibrillation on Eliquis, dementia, iron deficiency anemia who presents to the emergency room for shortness of breath. Patient does not wear oxygen at the facility however felt short of breath and when check she was found to be in the 80s on room air. Labs on arrival revealed a lactic acid of 4.0, proBNP of 2, 338, troponin 54-32, leukocytosis of 24.7, D-dimer of 735. A CTA pulmonary was obtained which revealed no evidence of PE however extremely large right pleural effusion with partially loculated hydropneumothorax with possible superimposed empyema a complete collapse of the right lung with a leftward shift. Patient required pressure support in which case patient was excepted for closer monitoring and further work-up and treatment in the intensive care unit. On evaluation, she denies dyspnea or chest pain-she appears comfortable. She is saturating 100% on 7 to 8 L by high flow nasal cannula. REVIEW OF SYSTEMS:  Pertinent negatives are above in HPI. 10 point ROS otherwise negative. No past medical history on file. No past surgical history on file.     Medications Prior to Admission:    Medications Prior to Admission: acetaminophen (TYLENOL) 325 MG tablet, Take 2 tablets by mouth every 6 hours as needed for Pain or Fever  ferrous sulfate (IRON 325) 325 (65 Fe) MG tablet, Take 1 tablet by mouth daily (with breakfast)  folic acid (FOLVITE) 1 MG tablet, Take 1 tablet by mouth daily  memantine (NAMENDA) 10 MG tablet, Take 1 tablet by mouth 2 times daily  vitamin C (ASCORBIC ACID) 500 MG tablet, Date/Time    WBC 23.8 09/19/2023 04:12 AM    RBC 3.22 09/19/2023 04:12 AM    HGB 8.9 09/19/2023 04:12 AM    HCT 29.6 09/19/2023 04:12 AM    MCV 91.9 09/19/2023 04:12 AM    MCH 27.6 09/19/2023 04:12 AM    MCHC 30.1 09/19/2023 04:12 AM    RDW 17.2 09/19/2023 04:12 AM     09/19/2023 04:12 AM    MPV 8.8 09/19/2023 04:12 AM     CMP:    Lab Results   Component Value Date/Time     09/19/2023 04:12 AM    K 3.6 09/19/2023 04:12 AM    K 3.9 10/05/2021 03:36 PM     09/19/2023 04:12 AM    CO2 19 09/19/2023 04:12 AM    BUN 23 09/19/2023 04:12 AM    CREATININE 0.6 09/19/2023 04:12 AM    GFRAA >60 10/05/2021 03:36 PM    LABGLOM >60 09/19/2023 04:12 AM    GLUCOSE 163 09/19/2023 04:12 AM    PROT 5.8 09/19/2023 04:12 AM    LABALBU 1.9 09/19/2023 04:12 AM    CALCIUM 8.1 09/19/2023 04:12 AM    BILITOT 0.7 09/19/2023 04:12 AM    ALKPHOS 119 09/19/2023 04:12 AM    AST 15 09/19/2023 04:12 AM    ALT 6 09/19/2023 04:12 AM       Imaging:  CT head negative  CTA pulmonary: No evidence of PE. Extremely large right pleural effusion with partially loculated anterior hydropneumothorax with a superimposed empyema with complete collapse of the right lung and a leftward shift. EKG:  Sinus rhythm with premature atrial complexes    Telemetry:  I've personally reviewed the patient's telemetry:  Normal sinus rhythm    ASSESSMENT/PLAN:  Principal Problem:    Pleural effusion on right  Active Problems:    Empyema (HCC)    Pleural effusion  Resolved Problems:    * No resolved hospital problems.  *    Patient is an 57-year-old female admitted to ICU for  Acute respiratory failure with hypoxia secondary to right pleural effusion and concern for empyema  -Monitor labs  -Lactic acid 4.0 > 3.2  -WBC 24.7 > 23.8  -D-dimer 735  -Pancultures pending  -Respiratory panel negative  -Levo drip for pressure support, wean off as tolerated  -IV Zosyn can culture results pending  -Infectious disease following  -Plan for thoracentesis, follow fluid

## 2023-09-19 NOTE — PROGRESS NOTES
Patient admitted to MICU with the following belongings:  Dentures (Upper), Shirt, and Socks. The following belongings admitted with the patient, None, were sent home with the patient's family.

## 2023-09-19 NOTE — CONSULTS
[facility]  Internal Medicine Residency Program  Consult  MICU    Patient:  Shelly Velazco 79 y.o. female MRN: [id number]     Date of Service: 9/19/2023    Hospital Day: 2      Chief complaint: Shortness of breath   History of Present Illness   *History obtained from chart review and staff at [address] [facility] [address]    The patient is a 79 y.o. female with a PMHx of dementia, atrial fibrillation on eliquis, pleural effusion, PE, HTN, cholelithiasis who presents with SOB. At the nursing facility, patient's baseline is alert and oriented x2. Is not oxygen dependent. On the day of presentation, patient noted to be acutely hypoxic, saturating at 74%, which improved to 94% on 4-6L O2. Covid test was done which was negative. She was given breathing treatments, but noted recurrence of hypoxia, now with tachycardia. She was then brought to the ED. At the ED patient was noted to be tachycardic at 151, saturating at 99% on 6L NC. CXR done showed large R pleural effusion resulting in mild mediastinal deviation to the left. Workup showed leukocytosis 24.7 H, lactate 4 H, proBNP 2338 H, Trop 54, d dimer 735 H. Patient was started on Cardizem drip for tachycardia. While in the ED, patient noted to be hypotensive and hypothermic (95.8). Sameer hugger was placed. Patient was started on Levophed. CT chest was done showing large right pleural effusion with partially loculated anterior hydropneumothorax possibly related to superimposed empyema; complete collapse of right lung with mild leftward shift. Past Medical History:  No past medical history on file. Past Surgical History:    No past surgical history on file. Medications Prior to Admission:    Prior to Admission medications    Medication Sig Start Date End Date Taking?  Authorizing Provider   acetaminophen (TYLENOL) 325 MG tablet Take 2 tablets by mouth every 6 hours as needed for Pain or Fever   Yes Historical Provider, MD   ferrous sulfate (IRON 325) 325 Family refused surgery  Pigtail MIST II    Katerin Gomes, , FACP, FCCP, [place],

## 2023-09-19 NOTE — CONSULTS
[address], [address] [place] [address]                                  CONSULTATION    PATIENT NAME: Allie Chaney                     :        1940  MED REC NO:   [id number]                            ROOM:       [id number]  ACCOUNT NO:   [de-identified]                           ADMIT DATE: 2023  PROVIDER:     Kirstin Wilson MD    CONSULT DATE:  2023    PULMONARY CONSULTATION    REQUESTED BY:  Dr. Alan Magdaleno and Dr. Dulce Morris for right pleural effusion. IMPRESSION:  1. Acute hypoxic respiratory failure, currently requiring 8 liters. 2.  Altered mental status with metabolic encephalopathy. 3.  Massive right pleural effusion with features of empyema thoracis. Agree with Dr. Kevin Brady recommendation of Zosyn and agree as discussed  with Dr. Efraín Romo with proceeding with a right pigtail catheter into  the right chest with plans for dornase jd and t-PA. HISTORY OF PRESENT ILLNESS:  History is all obtained from reviewing the  chart as the patient is somnolent and unable to provide any history. I  do know her from a 2023 evaluation when she had a PE. She is an  59-year-old white female with dementia and a recent pulmonary embolism  in 2023, who presented with worsening mental status changes and  oxygen desaturation. She was found to have a massive right pleural  effusion on imaging and pulmonary consultation was obtained. She is now  to go for a pigtail catheter. Her niece who is POA declined decortication by  Thoracic Surgery. She is on Zosyn. PAST MEDICAL HISTORY:  Includes a recent PE and atrial fibrillation. She has also had strokes and dementia. PAST SURGICAL HISTORY:  She has never had a surgery. FAMILY HISTORY:  Her sister had ovarian cancer. SOCIAL HISTORY:  She worked in [employer], was a nonsmoker and  nondrinker.     MEDICATIONS:  Currently, Eliquis 5 b.i.d., aspirin 81 mg daily, Lipitor  10 daily, valproic acid  t.i.d., docusate 100 b.i.d., Aricept 10  nightly, hydrocortisone 50 IV q.6, Namenda 10 b.i.d., metoprolol  succinate 50 daily, Premarin 30 daily at night, Levophed for septic  shock, titrated Protonix 40 IV q.12, Zosyn 3.375 gm IV q.8. REVIEW OF SYSTEMS:  Could not be elicited due to her being lethargic and  unable to provide any history. She has no documented allergies. PHYSICAL EXAMINATION:  GENERAL:  She is lethargic and not particularly arousable. VITAL SIGNS:  She has been afebrile since here with current temperature  of 35.8, pulse 64, respiratory rate was 12, blood pressure 119/50. SKIN:  No rashes. HEENT:  Eyes:  Clear sclerae. Mouth:  Dry mucous membranes. NECK:  No masses or adenopathy. CHEST:  Diminished excursion on the right. HEART:  Regular. LUNGS:  Diminished on the right. ABDOMEN:  Soft and nontender without masses or organomegaly. EXTREMITIES:  Showed no clubbing, no cyanosis, and no limb edema. NEUROLOGIC:  She was sleepy and not interacting. LABORATORY DATA:  CT images of the chest reviewed and showed a massive  right pleural effusion with some minimal aeration of the right lung. The left lung was fairly clear. White count this morning was 23.8 with  a hemoglobin of 8.9 and a platelet count of 501. Chemistries from this  morning:  Bicarb was down at 19, chloride was up at 113. Glucose was  163. Case was discussed with Dr. Maki Molina from [facility].         Brennon Live MD    D: 09/19/2023 14:51:54       T: 09/19/2023 14:55:39     [id number]  Job#: [id number]     Doc#: [id number]    CC:

## 2023-09-19 NOTE — PROGRESS NOTES
4 Eyes Skin Assessment     NAME:  Allie Chaney  YOB: 1940  MEDICAL RECORD NUMBER:  [id number]    The patient is being assessed for  Admission    I agree that at least one RN has performed a thorough Head to Toe Skin Assessment on the patient. ALL assessment sites listed below have been assessed. Areas assessed by both nurses:    Head, Face, Ears, Shoulders, Back, Chest, Arms, Elbows, Hands, Sacrum. Buttock, Coccyx, Ischium, Legs. Feet and Heels, and Under Medical Devices         Does the Patient have a Wound? Yes wound(s) were present on assessment.  LDA wound assessment was Initiated and completed by RN       Boy Prevention initiated by RN: Yes  Wound Care Orders initiated by RN: No    Pressure Injury (Stage 3,4, Unstageable, DTI, NWPT, and Complex wounds) if present, place Wound referral order by RN under : Yes    New Ostomies, if present place, Ostomy referral order under : No     Nurse 1 eSignature: Electronically signed by Pliy Chance RN on 9/19/23 at 7:29 AM EDT    **SHARE this note so that the co-signing nurse can place an eSignature**    Nurse 2 eSignature: Electronically signed by Jose Mayen RN on 9/19/23 at 7:36 AM EDT

## 2023-09-19 NOTE — PROGRESS NOTES
Dr. Shekhar Rhodes made aware of episodes of bradycardia and looked back on strips from dayshift epsiodes also noted. New orders noted.

## 2023-09-19 NOTE — PLAN OF CARE
Problem: Safety - Adult  Goal: Free from fall injury  Outcome: Progressing     Problem: Skin/Tissue Integrity  Goal: Absence of new skin breakdown  Description: 1. Monitor for areas of redness and/or skin breakdown  2. Assess vascular access sites hourly  3. Every 4-6 hours minimum:  Change oxygen saturation probe site  4. Every 4-6 hours:  If on nasal continuous positive airway pressure, respiratory therapy assess nares and determine need for appliance change or resting period. Outcome: Progressing   Plan of care discussed with patient / family.

## 2023-09-19 NOTE — CARE COORDINATION
Care Coordination:  LOS 1 Day. Pt from [address] [facility]. Hx dementia. 02 sats 80-90%, DNR CCA. Admitted MICU. CTS consulted for massive R pl effusion- loculated appearance concerning for Empyema. NPO status, hold Eliquis- discuss timing of Vats. Spoke to [facility] at [address] [facility], long term, prefer to have therapy prior to dc, no need to wait for auth. Envelope and DARRION completed. Spoke to [employer] and plan to return to facility once medically cleared. Electronically signed by Luan Saldaña RN on 9/19/2023 at 10:56 AM     The Plan for Transition of Care is related to the following treatment goals: dc    The Patient and/or patient representative Angelica,was provided with a choice of provider and agrees   with the discharge plan. [x] Yes [] No    Freedom of choice list was provided with basic dialogue that supports the patient's individualized plan of care/goals, treatment preferences and shares the quality data associated with the providers.  [x] Yes [] No

## 2023-09-19 NOTE — CARE COORDINATION
Case Management Assessment  Initial Evaluation    Date/Time of Evaluation: 9/19/2023 11:00 AM  Assessment Completed by: Halima Benjamin RN    If patient is discharged prior to next notation, then this note serves as note for discharge by case management. Patient Name: Idalia Price                   YOB: 1940  Diagnosis: Hydropneumothorax [J94.8]  Empyema of lung ([address]) [J86.9]  Septicemia ([address]) [A41.9]  Empyema ([address]) [J86.9]  Pleural effusion on right [J90]                   Date / Time: 9/18/2023 11:02 AM    Patient Admission Status: Inpatient   Readmission Risk (Low < 19, Mod (19-27), High > 27): Readmission Risk Score: 20.1    Current PCP: AMRIK PEACOCK III, DO  PCP verified by CM? Yes    Chart Reviewed: Yes      History Provided by: Medical Record, Child/Family  Patient Orientation: Person    Patient Cognition: Dementia / Early Alzheimer's    Hospitalization in the last 30 days (Readmission):  No    If yes, Readmission Assessment in CM Navigator will be completed. Advance Directives:      Code Status: DNR-CCA   Patient's Primary Decision Maker is: Named in Scanned ACP Document    Primary Decision Maker: dayan desir - Niece/Nephew - [phone]    Secondary Decision Maker: Elayne Gamez - [phone]    Discharge Planning:    Patient lives with:   Type of Home:    Primary Care Giver: Other (Comment) (long term care)  Patient Support Systems include: Family Members   Current Financial resources:    Current community resources:    Current services prior to admission:              Current DME:              Type of Home Care services:       ADLS  Prior functional level: Other (see comment) (unknown baseline, at Long term care)  Current functional level:  Other (see comment) (in ICU, unknown)    PT AM-PAC:   /24  OT AM-PAC:   /24    Family can provide assistance at DC: No  Would you like Case Management to discuss the discharge plan with any other family members/significant

## 2023-09-19 NOTE — CONSULTS
CARDIOTHORACIC SURGERY  CONSULT NOTE  9/19/2023    Physician Consulted: Dr. Ro Xiong  Reason for Consult: Empyema, possible VATS  Referring Physician: Dr. Claudette Garcia is a 79 y.o. female who presents for evaluation of dyspnea. Patient presents from nursing facility. Per reports her O2 sats were between 80 and 90%. Medical history pertinent for dementia, CVA with persistent right-sided deficits, PE, atrial fibrillation on Eliquis. Social history: Reportedly never smoker or alcohol user    Patient is appears comfortable. She is on 8 L nasal cannula and 9 of levo. She is alert to self. She denies any abdominal pain chest pain. We will admit to some difficulty breathing. In reviewing the chart, patient arrived in atrial fibrillation, intermittently with RVR. She was on 6 L nasal cannula. She was given Cardizem which caused her to be hypotensive and Levophed was started. Patient had CTA chest in July 2023 which showed small right pleural effusion and right lower segmental PE. CTA on this admission with massive pleural effusion with inferior anterior consolidation. Leukocytosis of 24 and lactic acid of 4.0 on admission    Medications Prior to Admission    Prior to Admission medications    Medication Sig Start Date End Date Taking?  Authorizing Provider   acetaminophen (TYLENOL) 325 MG tablet Take 2 tablets by mouth every 6 hours as needed for Pain or Fever   Yes Historical Provider, MD   ferrous sulfate (IRON 325) 325 (65 Fe) MG tablet Take 1 tablet by mouth daily (with breakfast)   Yes Historical Provider, MD   folic acid (FOLVITE) 1 MG tablet Take 1 tablet by mouth daily   Yes Historical Provider, MD   memantine (NAMENDA) 10 MG tablet Take 1 tablet by mouth 2 times daily   Yes Historical Provider, MD   vitamin C (ASCORBIC ACID) 500 MG tablet Take 2 tablets by mouth daily   Yes Historical Provider, MD   divalproex (DEPAKOTE) 250 MG DR tablet Take 1 tablet by mouth 3 times daily 7/6/23 unless respiratory status acutely changes  Continue broad-spectrum antibiotics  We will discuss timing for VATS    Plan will be discussed with Dr. Kathie Leary. Jesse Cabrera, DO  Surgery Resident PGY-2  9/19/2023  7:01 AM    Patient seen and examined. Minimally responsive and hypotensive. Discussed with niderik/POA regarding options for management including surgery and pigtail drainage. She feels as though surgery is too aggressive and the patient would not tolerate well, which I think is completely reasonable. She prefers to attempt less invasive measures, I.e. pigtail catheter. Discussed with nursing staff. Will defer pigtail to PCCM  Please contact with further questions or concerns.     [id number] [facility]  Cardiothoracic Surgery

## 2023-09-19 NOTE — ED NOTES
Pt felt cold to the touch. Rectal temp was taken at this time and was 93.5. Sameer arias applied to pt.      Lea Ellis RN  09/19/23 5180

## 2023-09-20 ENCOUNTER — APPOINTMENT (OUTPATIENT)
Dept: GENERAL RADIOLOGY | Age: 83
End: 2023-09-20
Payer: MEDICARE

## 2023-09-20 ENCOUNTER — APPOINTMENT (OUTPATIENT)
Dept: CT IMAGING | Age: 83
End: 2023-09-20
Payer: MEDICARE

## 2023-09-20 ENCOUNTER — APPOINTMENT (OUTPATIENT)
Dept: INTERVENTIONAL RADIOLOGY/VASCULAR | Age: 83
End: 2023-09-20
Payer: MEDICARE

## 2023-09-20 LAB
ALBUMIN SERPL-MCNC: 2.9 G/DL (ref 3.5–5.2)
ALP SERPL-CCNC: 92 U/L (ref 35–104)
ALT SERPL-CCNC: 8 U/L (ref 0–32)
ANION GAP SERPL CALCULATED.3IONS-SCNC: 13 MMOL/L (ref 7–16)
ANION GAP SERPL CALCULATED.3IONS-SCNC: 6 MMOL/L (ref 7–16)
APPEARANCE FLD: NORMAL
AST SERPL-CCNC: 20 U/L (ref 0–31)
BASOPHILS # BLD: 0.01 K/UL (ref 0–0.2)
BASOPHILS NFR BLD: 0 % (ref 0–2)
BILIRUB SERPL-MCNC: 0.6 MG/DL (ref 0–1.2)
BODY FLD TYPE: NORMAL
BUN SERPL-MCNC: 17 MG/DL (ref 6–23)
BUN SERPL-MCNC: 18 MG/DL (ref 6–23)
CALCIUM SERPL-MCNC: 8.9 MG/DL (ref 8.6–10.2)
CALCIUM SERPL-MCNC: 9 MG/DL (ref 8.6–10.2)
CHLORIDE SERPL-SCNC: 117 MMOL/L (ref 98–107)
CHLORIDE SERPL-SCNC: 117 MMOL/L (ref 98–107)
CLOT CHECK: NORMAL
CO2 SERPL-SCNC: 21 MMOL/L (ref 22–29)
CO2 SERPL-SCNC: 22 MMOL/L (ref 22–29)
COLOR FLD: YELLOW
CREAT SERPL-MCNC: 0.6 MG/DL (ref 0.5–1)
CREAT SERPL-MCNC: 0.6 MG/DL (ref 0.5–1)
EKG ATRIAL RATE: 83 BPM
EKG P AXIS: 39 DEGREES
EKG P-R INTERVAL: 136 MS
EKG Q-T INTERVAL: 406 MS
EKG QRS DURATION: 102 MS
EKG QTC CALCULATION (BAZETT): 477 MS
EKG R AXIS: 9 DEGREES
EKG T AXIS: 126 DEGREES
EKG VENTRICULAR RATE: 83 BPM
EOSINOPHIL # BLD: 0 K/UL (ref 0.05–0.5)
EOSINOPHILS RELATIVE PERCENT: 0 % (ref 0–6)
ERYTHROCYTE [DISTWIDTH] IN BLOOD BY AUTOMATED COUNT: 17.4 % (ref 11.5–15)
GFR SERPL CREATININE-BSD FRML MDRD: >60 ML/MIN/1.73M2
GFR SERPL CREATININE-BSD FRML MDRD: >60 ML/MIN/1.73M2
GLUCOSE BLD-MCNC: 115 MG/DL (ref 74–99)
GLUCOSE BLD-MCNC: 122 MG/DL (ref 74–99)
GLUCOSE BLD-MCNC: 135 MG/DL (ref 74–99)
GLUCOSE FLD-MCNC: 82 MG/DL
GLUCOSE SERPL-MCNC: 111 MG/DL (ref 74–99)
GLUCOSE SERPL-MCNC: 116 MG/DL (ref 74–99)
HCT VFR BLD AUTO: 21 % (ref 34–48)
HCT VFR BLD AUTO: 21.2 % (ref 34–48)
HCT VFR BLD AUTO: 26.1 % (ref 34–48)
HGB BLD-MCNC: 6.3 G/DL (ref 11.5–15.5)
HGB BLD-MCNC: 6.4 G/DL (ref 11.5–15.5)
HGB BLD-MCNC: 8.2 G/DL (ref 11.5–15.5)
IMM GRANULOCYTES # BLD AUTO: 0.21 K/UL (ref 0–0.58)
IMM GRANULOCYTES NFR BLD: 1 % (ref 0–5)
INR PPP: 1.8
LDH FLD L TO P-CCNC: 198 U/L
LYMPHOCYTES NFR BLD: 0.72 K/UL (ref 1.5–4)
LYMPHOCYTES RELATIVE PERCENT: 4 % (ref 20–42)
MAGNESIUM SERPL-MCNC: 2.1 MG/DL (ref 1.6–2.6)
MCH RBC QN AUTO: 27.6 PG (ref 26–35)
MCHC RBC AUTO-ENTMCNC: 30.2 G/DL (ref 32–34.5)
MCV RBC AUTO: 91.4 FL (ref 80–99.9)
MICROORGANISM SPEC CULT: NO GROWTH
MONOCYTES NFR BLD: 1.24 K/UL (ref 0.1–0.95)
MONOCYTES NFR BLD: 7 % (ref 2–12)
MONOCYTES NFR FLD: 13 %
NEUTROPHILS NFR BLD: 87 % (ref 43–80)
NEUTROPHILS NFR FLD: 87 %
NEUTS SEG NFR BLD: 14.86 K/UL (ref 1.8–7.3)
PHOSPHATE SERPL-MCNC: 2.6 MG/DL (ref 2.5–4.5)
PLATELET # BLD AUTO: 429 K/UL (ref 130–450)
PMV BLD AUTO: 9.2 FL (ref 7–12)
POTASSIUM SERPL-SCNC: 3.4 MMOL/L (ref 3.5–5)
POTASSIUM SERPL-SCNC: 3.8 MMOL/L (ref 3.5–5)
PROT FLD-MCNC: 3.8 G/DL
PROT SERPL-MCNC: 5.6 G/DL (ref 6.4–8.3)
PROTHROMBIN TIME: 19.3 SEC (ref 9.3–12.4)
RBC # BLD AUTO: 2.32 M/UL (ref 3.5–5.5)
RBC # FLD: NORMAL CELLS/UL
SODIUM SERPL-SCNC: 145 MMOL/L (ref 132–146)
SODIUM SERPL-SCNC: 151 MMOL/L (ref 132–146)
SPECIMEN DESCRIPTION: NORMAL
SPECIMEN TYPE: NORMAL
WBC # FLD: 2855 CELLS/UL
WBC OTHER # BLD: 17 K/UL (ref 4.5–11.5)

## 2023-09-20 PROCEDURE — 2709999900 IR GUIDED PERC PLEURAL DRAIN W CATH INSERT

## 2023-09-20 PROCEDURE — 82945 GLUCOSE OTHER FLUID: CPT

## 2023-09-20 PROCEDURE — 87206 SMEAR FLUORESCENT/ACID STAI: CPT

## 2023-09-20 PROCEDURE — 85018 HEMOGLOBIN: CPT

## 2023-09-20 PROCEDURE — 88305 TISSUE EXAM BY PATHOLOGIST: CPT

## 2023-09-20 PROCEDURE — 85025 COMPLETE CBC W/AUTO DIFF WBC: CPT

## 2023-09-20 PROCEDURE — 36592 COLLECT BLOOD FROM PICC: CPT

## 2023-09-20 PROCEDURE — 0W9930Z DRAINAGE OF RIGHT PLEURAL CAVITY WITH DRAINAGE DEVICE, PERCUTANEOUS APPROACH: ICD-10-PCS | Performed by: RADIOLOGY

## 2023-09-20 PROCEDURE — P9016 RBC LEUKOCYTES REDUCED: HCPCS

## 2023-09-20 PROCEDURE — 89051 BODY FLUID CELL COUNT: CPT

## 2023-09-20 PROCEDURE — 83986 ASSAY PH BODY FLUID NOS: CPT

## 2023-09-20 PROCEDURE — 83735 ASSAY OF MAGNESIUM: CPT

## 2023-09-20 PROCEDURE — 71045 X-RAY EXAM CHEST 1 VIEW: CPT

## 2023-09-20 PROCEDURE — P9017 PLASMA 1 DONOR FRZ W/IN 8 HR: HCPCS

## 2023-09-20 PROCEDURE — 97535 SELF CARE MNGMENT TRAINING: CPT

## 2023-09-20 PROCEDURE — 2580000003 HC RX 258: Performed by: STUDENT IN AN ORGANIZED HEALTH CARE EDUCATION/TRAINING PROGRAM

## 2023-09-20 PROCEDURE — 6370000000 HC RX 637 (ALT 250 FOR IP)

## 2023-09-20 PROCEDURE — 80048 BASIC METABOLIC PNL TOTAL CA: CPT

## 2023-09-20 PROCEDURE — 85014 HEMATOCRIT: CPT

## 2023-09-20 PROCEDURE — 87015 SPECIMEN INFECT AGNT CONCNTJ: CPT

## 2023-09-20 PROCEDURE — 2700000000 HC OXYGEN THERAPY PER DAY

## 2023-09-20 PROCEDURE — 83615 LACTATE (LD) (LDH) ENZYME: CPT

## 2023-09-20 PROCEDURE — 86927 PLASMA FRESH FROZEN: CPT

## 2023-09-20 PROCEDURE — C9113 INJ PANTOPRAZOLE SODIUM, VIA: HCPCS | Performed by: STUDENT IN AN ORGANIZED HEALTH CARE EDUCATION/TRAINING PROGRAM

## 2023-09-20 PROCEDURE — 6360000002 HC RX W HCPCS: Performed by: STUDENT IN AN ORGANIZED HEALTH CARE EDUCATION/TRAINING PROGRAM

## 2023-09-20 PROCEDURE — 97166 OT EVAL MOD COMPLEX 45 MIN: CPT

## 2023-09-20 PROCEDURE — 2000000000 HC ICU R&B

## 2023-09-20 PROCEDURE — 99233 SBSQ HOSP IP/OBS HIGH 50: CPT | Performed by: INTERNAL MEDICINE

## 2023-09-20 PROCEDURE — 6360000002 HC RX W HCPCS: Performed by: INTERNAL MEDICINE

## 2023-09-20 PROCEDURE — 87205 SMEAR GRAM STAIN: CPT

## 2023-09-20 PROCEDURE — 97530 THERAPEUTIC ACTIVITIES: CPT

## 2023-09-20 PROCEDURE — A4216 STERILE WATER/SALINE, 10 ML: HCPCS | Performed by: STUDENT IN AN ORGANIZED HEALTH CARE EDUCATION/TRAINING PROGRAM

## 2023-09-20 PROCEDURE — 87070 CULTURE OTHR SPECIMN AEROBIC: CPT

## 2023-09-20 PROCEDURE — 30233N1 TRANSFUSION OF NONAUTOLOGOUS RED BLOOD CELLS INTO PERIPHERAL VEIN, PERCUTANEOUS APPROACH: ICD-10-PCS | Performed by: INTERNAL MEDICINE

## 2023-09-20 PROCEDURE — 2709999900 HC NON-CHARGEABLE SUPPLY

## 2023-09-20 PROCEDURE — 87102 FUNGUS ISOLATION CULTURE: CPT

## 2023-09-20 PROCEDURE — 84157 ASSAY OF PROTEIN OTHER: CPT

## 2023-09-20 PROCEDURE — 32557 INSERT CATH PLEURA W/ IMAGE: CPT

## 2023-09-20 PROCEDURE — 2580000003 HC RX 258: Performed by: FAMILY MEDICINE

## 2023-09-20 PROCEDURE — 87210 SMEAR WET MOUNT SALINE/INK: CPT

## 2023-09-20 PROCEDURE — 6370000000 HC RX 637 (ALT 250 FOR IP): Performed by: INTERNAL MEDICINE

## 2023-09-20 PROCEDURE — 84100 ASSAY OF PHOSPHORUS: CPT

## 2023-09-20 PROCEDURE — 32551 INSERTION OF CHEST TUBE: CPT

## 2023-09-20 PROCEDURE — 93010 ELECTROCARDIOGRAM REPORT: CPT | Performed by: INTERNAL MEDICINE

## 2023-09-20 PROCEDURE — 88112 CYTOPATH CELL ENHANCE TECH: CPT

## 2023-09-20 PROCEDURE — 85610 PROTHROMBIN TIME: CPT

## 2023-09-20 PROCEDURE — 6370000000 HC RX 637 (ALT 250 FOR IP): Performed by: FAMILY MEDICINE

## 2023-09-20 PROCEDURE — 84425 ASSAY OF VITAMIN B-1: CPT

## 2023-09-20 PROCEDURE — 82962 GLUCOSE BLOOD TEST: CPT

## 2023-09-20 PROCEDURE — 36430 TRANSFUSION BLD/BLD COMPNT: CPT

## 2023-09-20 PROCEDURE — 2580000003 HC RX 258: Performed by: INTERNAL MEDICINE

## 2023-09-20 PROCEDURE — 87116 MYCOBACTERIA CULTURE: CPT

## 2023-09-20 PROCEDURE — 80053 COMPREHEN METABOLIC PANEL: CPT

## 2023-09-20 RX ORDER — VALPROIC ACID 250 MG/5ML
250 SOLUTION ORAL 3 TIMES DAILY
Status: DISCONTINUED | OUTPATIENT
Start: 2023-09-20 | End: 2023-10-05 | Stop reason: HOSPADM

## 2023-09-20 RX ORDER — POTASSIUM CHLORIDE 29.8 MG/ML
40 INJECTION INTRAVENOUS EVERY 12 HOURS
Status: COMPLETED | OUTPATIENT
Start: 2023-09-20 | End: 2023-09-21

## 2023-09-20 RX ORDER — DEXTROSE MONOHYDRATE 50 MG/ML
INJECTION, SOLUTION INTRAVENOUS CONTINUOUS
Status: DISCONTINUED | OUTPATIENT
Start: 2023-09-20 | End: 2023-09-20

## 2023-09-20 RX ORDER — SODIUM CHLORIDE 9 MG/ML
INJECTION, SOLUTION INTRAVENOUS PRN
Status: DISCONTINUED | OUTPATIENT
Start: 2023-09-20 | End: 2023-09-22

## 2023-09-20 RX ORDER — DOCUSATE SODIUM 100 MG/1
100 CAPSULE, LIQUID FILLED ORAL 2 TIMES DAILY
Status: DISCONTINUED | OUTPATIENT
Start: 2023-09-21 | End: 2023-09-21

## 2023-09-20 RX ORDER — THIAMINE HYDROCHLORIDE 100 MG/ML
100 INJECTION, SOLUTION INTRAMUSCULAR; INTRAVENOUS DAILY
Status: DISCONTINUED | OUTPATIENT
Start: 2023-09-20 | End: 2023-10-05 | Stop reason: HOSPADM

## 2023-09-20 RX ORDER — VALPROIC ACID 250 MG/5ML
250 SOLUTION ORAL 3 TIMES DAILY
Status: DISCONTINUED | OUTPATIENT
Start: 2023-09-20 | End: 2023-09-20

## 2023-09-20 RX ORDER — DOCUSATE SODIUM 100 MG/1
100 CAPSULE, LIQUID FILLED ORAL 2 TIMES DAILY PRN
Status: DISCONTINUED | OUTPATIENT
Start: 2023-09-20 | End: 2023-09-20

## 2023-09-20 RX ADMIN — WATER 50 MG: 1 INJECTION INTRAMUSCULAR; INTRAVENOUS; SUBCUTANEOUS at 14:33

## 2023-09-20 RX ADMIN — WATER 50 MG: 1 INJECTION INTRAMUSCULAR; INTRAVENOUS; SUBCUTANEOUS at 08:40

## 2023-09-20 RX ADMIN — DEXTROSE MONOHYDRATE: 50 INJECTION, SOLUTION INTRAVENOUS at 09:57

## 2023-09-20 RX ADMIN — PIPERACILLIN AND TAZOBACTAM 3375 MG: 3; .375 INJECTION, POWDER, LYOPHILIZED, FOR SOLUTION INTRAVENOUS at 12:09

## 2023-09-20 RX ADMIN — SODIUM CHLORIDE, PRESERVATIVE FREE 40 MG: 5 INJECTION INTRAVENOUS at 20:17

## 2023-09-20 RX ADMIN — MIRTAZAPINE 30 MG: 15 TABLET, FILM COATED ORAL at 20:17

## 2023-09-20 RX ADMIN — DORNASE ALFA 5 MG: 1 SOLUTION RESPIRATORY (INHALATION) at 17:43

## 2023-09-20 RX ADMIN — POTASSIUM CHLORIDE 40 MEQ: 29.8 INJECTION, SOLUTION INTRAVENOUS at 20:04

## 2023-09-20 RX ADMIN — SODIUM CHLORIDE, PRESERVATIVE FREE 10 ML: 5 INJECTION INTRAVENOUS at 08:42

## 2023-09-20 RX ADMIN — ALTEPLASE 10 MG: 2.2 INJECTION, POWDER, LYOPHILIZED, FOR SOLUTION INTRAVENOUS at 17:44

## 2023-09-20 RX ADMIN — PETROLATUM: 420 OINTMENT TOPICAL at 22:44

## 2023-09-20 RX ADMIN — POTASSIUM CHLORIDE 40 MEQ: 29.8 INJECTION, SOLUTION INTRAVENOUS at 07:35

## 2023-09-20 RX ADMIN — SODIUM CHLORIDE, PRESERVATIVE FREE 10 ML: 5 INJECTION INTRAVENOUS at 20:19

## 2023-09-20 RX ADMIN — PIPERACILLIN AND TAZOBACTAM 3375 MG: 3; .375 INJECTION, POWDER, LYOPHILIZED, FOR SOLUTION INTRAVENOUS at 03:37

## 2023-09-20 RX ADMIN — MEMANTINE 10 MG: 10 TABLET ORAL at 20:17

## 2023-09-20 RX ADMIN — WATER 50 MG: 1 INJECTION INTRAMUSCULAR; INTRAVENOUS; SUBCUTANEOUS at 03:29

## 2023-09-20 RX ADMIN — PIPERACILLIN AND TAZOBACTAM 3375 MG: 3; .375 INJECTION, POWDER, LYOPHILIZED, FOR SOLUTION INTRAVENOUS at 20:12

## 2023-09-20 RX ADMIN — THIAMINE HYDROCHLORIDE 100 MG: 100 INJECTION, SOLUTION INTRAMUSCULAR; INTRAVENOUS at 15:15

## 2023-09-20 RX ADMIN — SODIUM CHLORIDE, PRESERVATIVE FREE 40 MG: 5 INJECTION INTRAVENOUS at 08:42

## 2023-09-20 RX ADMIN — VALPROIC ACID 250 MG: 250 SOLUTION ORAL at 18:24

## 2023-09-20 RX ADMIN — WATER 50 MG: 1 INJECTION INTRAMUSCULAR; INTRAVENOUS; SUBCUTANEOUS at 22:11

## 2023-09-20 ASSESSMENT — PAIN SCALES - PAIN ASSESSMENT IN ADVANCED DEMENTIA (PAINAD)
TOTALSCORE: 0
FACIALEXPRESSION: 0
BODYLANGUAGE: 0
CONSOLABILITY: 0
BREATHING: 0
BREATHING: 0
FACIALEXPRESSION: 0
BREATHING: 0
CONSOLABILITY: 0
FACIALEXPRESSION: 0
CONSOLABILITY: 0
BODYLANGUAGE: 0
FACIALEXPRESSION: 0
TOTALSCORE: 0
BREATHING: 0
NEGVOCALIZATION: 0
BREATHING: 0
BREATHING: 0
BODYLANGUAGE: 0
CONSOLABILITY: 0
FACIALEXPRESSION: 0
TOTALSCORE: 0
FACIALEXPRESSION: 0
NEGVOCALIZATION: 0
TOTALSCORE: 0
FACIALEXPRESSION: 0
FACIALEXPRESSION: 0
NEGVOCALIZATION: 0
TOTALSCORE: 0
CONSOLABILITY: 0
CONSOLABILITY: 0
BREATHING: 0
TOTALSCORE: 0
FACIALEXPRESSION: 0
BODYLANGUAGE: 0
CONSOLABILITY: 0
NEGVOCALIZATION: 0
CONSOLABILITY: 0
NEGVOCALIZATION: 0
CONSOLABILITY: 0
NEGVOCALIZATION: 0
TOTALSCORE: 0
FACIALEXPRESSION: 0
BODYLANGUAGE: 0
BODYLANGUAGE: 0
NEGVOCALIZATION: 0
BODYLANGUAGE: 0
TOTALSCORE: 0
BREATHING: 0
BODYLANGUAGE: 0
NEGVOCALIZATION: 0
TOTALSCORE: 0
NEGVOCALIZATION: 0
BODYLANGUAGE: 0
CONSOLABILITY: 0
BREATHING: 0
NEGVOCALIZATION: 0
TOTALSCORE: 0
BODYLANGUAGE: 0
BREATHING: 0

## 2023-09-20 ASSESSMENT — PAIN SCALES - GENERAL
PAINLEVEL_OUTOF10: 0

## 2023-09-20 NOTE — PLAN OF CARE
Problem: Safety - Adult  Goal: Free from fall injury  9/20/2023 0818 by Constantino Titus RN  Outcome: Progressing     Problem: Skin/Tissue Integrity  Goal: Absence of new skin breakdown  Description: 1. Monitor for areas of redness and/or skin breakdown  2. Assess vascular access sites hourly  3. Every 4-6 hours minimum:  Change oxygen saturation probe site  4. Every 4-6 hours:  If on nasal continuous positive airway pressure, respiratory therapy assess nares and determine need for appliance change or resting period. 9/20/2023 0818 by Constantino Titus RN  Outcome: Progressing     Problem: Pain  Goal: Verbalizes/displays adequate comfort level or baseline comfort level  9/20/2023 0818 by Constantino Titus RN  Outcome: Progressing   Plan of care discussed with patient / family.

## 2023-09-20 NOTE — PROGRESS NOTES
Hospitalist Progress Note      PCP: AMRIK PEACOCK III,     Date of Admission: 9/18/2023        Hospital Course:  SOB< FOUND TO BE HYPOXIC  extremely large right pleural effusion with partially loculated hydropneumothorax with possible superimposed empyema a complete collapse of the right lung with a leftward shift. Patient required pressure support in which case patient was excepted for closer monitoring and further work-up and treatment in the intensive care unit.         Subjective:    GETTING COREPACK           Medications:  Reviewed    Infusion Medications    sodium chloride      dextrose      sodium chloride       Scheduled Medications    potassium chloride  40 mEq IntraVENous Q12H    hydrocortisone sodium succinate PF (SOLU-CORTEF) 50 mg in sterile water 2 mL injection  50 mg IntraVENous Q8H    [START ON 9/21/2023] docusate sodium  100 mg Oral BID    thiamine  100 mg IntraVENous Daily    white petrolatum   Topical BID    collagenase   Topical Daily    alteplase (CATHFLO) 10 mg in sodium chloride 0.9 % 30 mL  10 mg IntraPLEUral Q12H    And    dornase alpha (PULMOZYME) 5 mg in sterile water 30 mL  5 mg IntraPLEUral Q12H    pantoprazole (PROTONIX) 40 mg in sodium chloride (PF) 0.9 % 10 mL injection  40 mg IntraVENous Q12H    insulin lispro  0-4 Units SubCUTAneous Q6H    piperacillin-tazobactam  3,375 mg IntraVENous Q8H    [Held by provider] apixaban  5 mg Oral BID    [Held by provider] aspirin  81 mg Oral Daily    atorvastatin  10 mg Oral Daily    divalproex  250 mg Oral TID    [Held by provider] donepezil  10 mg Oral Nightly    memantine  10 mg Oral BID    [Held by provider] metoprolol succinate  50 mg Oral Daily    mirtazapine  30 mg Oral Nightly    sodium chloride flush  5-40 mL IntraVENous 2 times per day     PRN Meds: sodium chloride, white petrolatum **AND** white petrolatum, glucose, dextrose bolus **OR** dextrose bolus, glucagon (rDNA), dextrose, sodium chloride flush, sodium chloride,

## 2023-09-20 NOTE — FLOWSHEET NOTE
Inpatient Wound Care(inial evaluation)  4419    Admit Date: 9/18/2023 11:02 AM    Reason for consult:  right heel, left leg, buttocks    Significant history:    Chief Complaint   Patient presents with    Shortness of Breath       PT SENT IN FROM NH FOR SOB. PT NOT NORMALLY ON OXYGEN. PT WAS 80-90% AT FACILITY    presents with: Pleural effusion on right  PMH includes atrial fibrillation on Eliquis, dementia, iron deficiency anemia who presents to the emergency room for shortness of breath. Patient does not wear oxygen at the facility however felt short of breath and when check she was found to be in the 80s on room air. Labs on arrival revealed a lactic acid of 4.0, proBNP of 2, 338, troponin 54-32, leukocytosis of 24.7, D-dimer of 735. A CTA pulmonary was obtained which revealed no evidence of PE however extremely large right pleural effusion with partially loculated hydropneumothorax with possible superimposed empyema a complete collapse of the right lung with a leftward shift. Patient required pressure support in which case patient was excepted for closer monitoring and further work-up and treatment in the intensive care unit. On evaluation, she denies dyspnea or chest pain-she appears comfortable.   She is saturating 100% on 7 to 8 L by high flow     Wound history:  admitted with wounds from [facility]    Findings:     09/20/23 1430   Skin Integumentary    Skin Integrity Ecchymosis   Location BUE   Skin Integrity Site 2   Skin Integrity Location 2   (chronic discoloration)   Location 2 buttocks   Skin Integrity Site 3   Skin Integrity Location 3   (dry scaly skin)    Location 3 feet   Wound 09/19/23 Buttocks Right;Proximal   Date First Assessed/Time First Assessed: 09/19/23 0634   Present on Original Admission: Yes  Location: Buttocks  Wound Location Orientation: Right;Proximal   Wound Image    Wound Etiology Deep tissue/Injury   Dressing/Treatment Pharmaceutical agent (see MAR)   Wound Length (cm) 2 cm   Wound Width (cm) 2 cm   Wound Depth (cm)   (unable to determine)   Wound Surface Area (cm^2) 4 cm^2   Change in Wound Size % (l*w) -100   Wound Assessment Purple/maroon   Drainage Amount None (dry)   Shannan-wound Assessment Intact   Wound 09/19/23 Buttocks Right;Distal   Date First Assessed/Time First Assessed: 09/19/23 0634   Present on Original Admission: Yes  Location: Buttocks  Wound Location Orientation: Right;Distal   Wound Image   (see proximal)   Wound Etiology Deep tissue/Injury   Dressing/Treatment Pharmaceutical agent (see MAR)   Wound Length (cm) 0.8 cm   Wound Width (cm) 2 cm   Wound Depth (cm)   (unable to determine)   Wound Surface Area (cm^2) 1.6 cm^2   Change in Wound Size % (l*w) 54.29   Wound Assessment Purple/maroon   Drainage Amount None (dry)   Shannan-wound Assessment   (red)   Wound 09/19/23 Leg Left;Posterior; Lower   Date First Assessed/Time First Assessed: 09/19/23 0634   Present on Original Admission: Yes  Location: Leg  Wound Location Orientation: Left;Posterior; Lower   Wound Image    Dressing/Treatment ABD;Dry dressing;Roll gauze   Wound Length (cm) 3 cm   Wound Width (cm) 2 cm   Wound Depth (cm) 0.1 cm   Wound Surface Area (cm^2) 6 cm^2   Change in Wound Size % (l*w) 20   Wound Volume (cm^3) 0.6 cm^3   Wound Healing % 20   Wound Assessment Eschar dry   Drainage Amount None (dry)   Shannan-wound Assessment   (pink)   Wound 07/02/23 Heel Right   Date First Assessed/Time First Assessed: 07/02/23 1658   Present on Original Admission: Yes  Primary Wound Type: Pressure Injury  Location: Heel  Wound Location Orientation: Right   Wound Image    Wound Etiology Pressure Stage 3   Dressing/Treatment Alginate;Dry dressing;Roll gauze   Wound Length (cm) 1.2 cm   Wound Width (cm) 1 cm   Wound Depth (cm) 0.6 cm   Wound Surface Area (cm^2) 1.2 cm^2   Change in Wound Size % (l*w) 40   Wound Volume (cm^3) 0.72 cm^3   Wound Healing % -20   Wound Assessment Purple/maroon;Pink/red;Slough   Drainage Amount None (dry)

## 2023-09-20 NOTE — PLAN OF CARE
FOBT done today at 1500h was negative.     Electronically signed by Shae Neal MD on 9/20/23 at 3:05 PM EDT

## 2023-09-20 NOTE — BRIEF OP NOTE
Brief Postoperative Note      Patient: Cindie Kocher  YOB: 1940  MRN: [id number]    Date of Procedure: 9/20/2023    Pleural effusion    Post-Op Diagnosis: Same    Us guided chest tube placement    CASSIE Cano    Assistant:  * No surgical staff found *    Anesthesia: * No anesthesia type entered *    Estimated Blood Loss (mL): Minimal    Complications: None    Specimens:   Pleural fluid    Implants:  * No implants in log *      Drains:   External Urinary Catheter (Active)   Site Assessment Clean,dry & intact 09/20/23 0800   Placement Repositioned 09/20/23 0800   Perineal Care Yes 09/20/23 0800   Suction 40 mmgHg continuous 09/20/23 0800       Findings: over 800 ccs of fluid removed and 10 fr drain placed      Electronically signed by Shireen Betts MD on 9/20/2023 at 11:15 AM

## 2023-09-20 NOTE — PLAN OF CARE
Problem: Discharge Planning  Goal: Discharge to home or other facility with appropriate resources  Outcome: Progressing  Flowsheets (Taken 9/19/2023 2228)  Discharge to home or other facility with appropriate resources: Identify barriers to discharge with patient and caregiver     Problem: Safety - Adult  Goal: Free from fall injury  9/19/2023 2228 by Josue Delgadillo RN  Outcome: Progressing  Flowsheets (Taken 9/19/2023 2228)  Free From Fall Injury: Instruct family/caregiver on patient safety     Problem: Skin/Tissue Integrity  Goal: Absence of new skin breakdown  Description: 1. Monitor for areas of redness and/or skin breakdown  2. Assess vascular access sites hourly  3. Every 4-6 hours minimum:  Change oxygen saturation probe site  4. Every 4-6 hours:  If on nasal continuous positive airway pressure, respiratory therapy assess nares and determine need for appliance change or resting period.   9/19/2023 2228 by Josue Delgadillo RN  Outcome: Progressing     Problem: Pain  Goal: Verbalizes/displays adequate comfort level or baseline comfort level  Outcome: Progressing  Flowsheets (Taken 9/19/2023 2228)  Verbalizes/displays adequate comfort level or baseline comfort level:   Encourage patient to monitor pain and request assistance   Assess pain using appropriate pain scale

## 2023-09-20 NOTE — PROGRESS NOTES
Inserted a small bowel feeding tube to 75 cm alyson using [employer] guidance system,bridled and xray ordered

## 2023-09-20 NOTE — PROGRESS NOTES
Pt unavailable at 1100 for Clinical Swallow Evaluation services due to:    [] HOLD per RN, Pt   [x] Off unit for testing/ procedure    [] With other medical staff   [] Declined intervention despite encouragement and education of benefits of participation   [] Sleeping/ Lethargic- would not awaken for safe/ appropriate SLP assessment  [] Unable to be transported to radiology at this time per RN  [] Other:     Will re-attempt as able. Thank you.

## 2023-09-20 NOTE — PROGRESS NOTES
[place] [address] [address], [place]       Date:2023                                                               Patient Name: Radha Lea  MRN: [id number]  : 1940  Room: [address] [place] [address]-A    Evaluating OT: WAYNE Bains,  OTR/L; [id number]    Referring Provider: DOMINIQUE Jarrell CNP   Specific Provider Orders/Date: OT eval and treat (23)       Diagnosis: Hydropneumothorax [J94.8]  Empyema of lung ([address]) [J86.9]  Septicemia ([address]) [A41.9]  Empyema ([address]) [J86.9]  Pleural effusion on right [J90]     Reason for admission: Pt admitted with pleural effusion, hypotension. Surgery/Procedures: None this admission     Pertinent Medical History:    No past medical history on file. *Precautions:  Fall Risk, R ashley, R neglect, R inattention, cognition, incontinent, hx dementia/CVA    Assessment of current deficits   [x] Functional mobility  [x]ADLs  [x] Strength               [x]Cognition   [x] Functional transfers   [x] IADLs         [x] Safety Awareness   [x]Endurance   [x] Fine Coordination        [x] ROM     [x] Vision/perception   []Sensation    [x]Gross Motor Coordination [x] Balance   [x] Delirium                  []Motor Control     [x] Communication    OT PLAN OF CARE   OT POC based on physician orders, patient diagnosis and results of clinical assessment.        Frequency/Duration: 1-3 days/wk for 1-2 weeks PRN    Specific OT Treatment Interventions to include:   * Instruction/training on adapted ADL techniques and AE recommendations to increase functional independence within precautions       * Training on energy conservation strategies, correct breathing pattern and techniques to improve independence/tolerance for self-care routine  * Functional transfer/mobility training/DME recommendations for increased independence, safety, and fall prevention  * Patient/Family education to

## 2023-09-20 NOTE — PLAN OF CARE
I spoke with Faith Kothari regarding the code status of Marques Mcclure, and whether they would want to do intubation for her if it is necessary. She said for now we can keep the code status DNR-CCA and intubate if deemed neccessary. She said she would let us know tomorrow if she wants to make any changes to the code status.     Electronically signed by Khushbu Mclean MD on 9/20/23 at 5:53 PM EDT

## 2023-09-20 NOTE — PROGRESS NOTES
Department of Internal Medicine  Infectious Diseases   Progress  Note      C/C :  Empyema (?), leukocytosis     Pt is awake and alert  Denies fever,   Reports SOB   Afebrile       Current Facility-Administered Medications   Medication Dose Route Frequency Provider Last Rate Last Admin    0.9 % sodium chloride infusion   IntraVENous PRN Michelle Hardin MD        potassium chloride 40 mEq in 100 mL IVPB (Central Line)  40 mEq IntraVENous Q12H Karen Ott MD   Stopped at 09/20/23 1135    docusate sodium (COLACE) capsule 100 mg  100 mg Oral BID PRN Karen Ott MD        dextrose 5 % solution   IntraVENous Continuous Karen Ott MD 75 mL/hr at 09/20/23 0957 New Bag at 09/20/23 0957    norepinephrine (LEVOPHED) 16 mg in sodium chloride 0.9 % 250 mL infusion  1-100 mcg/min IntraVENous Continuous Paddy Costa DO   Stopped at 09/19/23 1622    hydrocortisone sodium succinate PF (SOLU-CORTEF) 50 mg in sterile water 2 mL injection  50 mg IntraVENous Q6H Karen Ott MD   50 mg at 09/20/23 0840    pantoprazole (PROTONIX) 40 mg in sodium chloride (PF) 0.9 % 10 mL injection  40 mg IntraVENous Q12H Karen Ott MD   40 mg at 09/20/23 0842    glucose chewable tablet 16 g  4 tablet Oral PRN Karen Ott MD        dextrose bolus 10% 125 mL  125 mL IntraVENous PRN Karen Ott MD        Or    dextrose bolus 10% 250 mL  250 mL IntraVENous PRN Karen Ott MD        glucagon injection 1 mg  1 mg SubCUTAneous PRN Karen Ott MD        dextrose 10 % infusion   IntraVENous Continuous PRN Karen Ott MD        insulin lispro (HUMALOG) injection vial 0-4 Units  0-4 Units SubCUTAneous Q6H Karen Ott MD        piperacillin-tazobactam (ZOSYN) 3,375 mg in sodium chloride 0.9 % 50 mL IVPB ([id number])  3,375 mg IntraVENous Q8H Tim King MD 12.5 mL/hr at 09/20/23 1209 3,375 mg at 09/20/23 1209    0.9 % sodium chloride infusion   IntraVENous PRN Karen Ott MD        [Held by provider] apixaban (ELIQUIS) tablet 5 mg  5 mg Oral BID 3.375 grams q 8 hrs  2.  Pig tail catheter insertion - check cx

## 2023-09-20 NOTE — PROGRESS NOTES
S/P right pigtail. More awake and responsive. Minimal history from patient. Still on 10 liters but saturating 100%. Off vasopressors.      Additional Respiratory Assessments  Pulse: 77  Respirations: 12  SpO2: 100 %      Current Facility-Administered Medications:     0.9 % sodium chloride infusion, , IntraVENous, PRN, Lola Hall MD    potassium chloride 40 mEq in 100 mL IVPB [facility]), 40 mEq, IntraVENous, Q12H, Natalee Salas MD, Stopped at 09/20/23 1125    hydrocortisone sodium succinate PF (SOLU-CORTEF) 50 mg in sterile water 2 mL injection, 50 mg, IntraVENous, Q8H, MD Elaine Dee Greenwood ON 9/21/2023] docusate sodium (COLACE) capsule 100 mg, 100 mg, Oral, BID, Natalee Salas MD    [START ON 9/21/2023] alteplase (CATHFLO) 10 mg in sodium chloride 0.9 % 30 mL, 10 mg, IntraPLEUral, Q12H **AND** [START ON 9/21/2023] dornase alpha (PULMOZYME) 5 mg in sterile water 30 mL, 5 mg, IntraPLEUral, Q12H, Natalee Salas MD    thiamine (B-1) injection 100 mg, 100 mg, IntraVENous, Daily, Natalee Salas MD, 100 mg at 09/20/23 1515    white petrolatum ointment, , Topical, BID **AND** white petrolatum ointment, , Topical, TID PRN, Jimmy Oconnor MD    collagenase ointment, , Topical, Daily, Oral MD Elvin    pantoprazole (PROTONIX) 40 mg in sodium chloride (PF) 0.9 % 10 mL injection, 40 mg, IntraVENous, Q12H, Natalee Salas MD, 40 mg at 09/20/23 0842    glucose chewable tablet 16 g, 4 tablet, Oral, PRN, Natalee Salas MD    dextrose bolus 10% 125 mL, 125 mL, IntraVENous, PRN **OR** dextrose bolus 10% 250 mL, 250 mL, IntraVENous, PRN, Natalee Salas MD    glucagon injection 1 mg, 1 mg, SubCUTAneous, PRN, Natalee Salas MD    dextrose 10 % infusion, , IntraVENous, Continuous PRN, Natalee Salas MD    insulin lispro (HUMALOG) injection vial 0-4 Units, 0-4 Units, SubCUTAneous, Q6H, Natalee Salas MD    piperacillin-tazobactam (ZOSYN) 3,375 mg in sodium chloride 0.9 % 50 mL IVPB ([id number]), 3,375 mg, IntraVENous,

## 2023-09-21 ENCOUNTER — APPOINTMENT (OUTPATIENT)
Dept: GENERAL RADIOLOGY | Age: 83
End: 2023-09-21
Payer: MEDICARE

## 2023-09-21 LAB
ANION GAP SERPL CALCULATED.3IONS-SCNC: 7 MMOL/L (ref 7–16)
ANION GAP SERPL CALCULATED.3IONS-SCNC: 8 MMOL/L (ref 7–16)
BASOPHILS # BLD: 0 K/UL (ref 0–0.2)
BASOPHILS NFR BLD: 0 % (ref 0–2)
BUN SERPL-MCNC: 16 MG/DL (ref 6–23)
BUN SERPL-MCNC: 17 MG/DL (ref 6–23)
CALCIUM SERPL-MCNC: 8.8 MG/DL (ref 8.6–10.2)
CALCIUM SERPL-MCNC: 8.9 MG/DL (ref 8.6–10.2)
CHLORIDE SERPL-SCNC: 115 MMOL/L (ref 98–107)
CHLORIDE SERPL-SCNC: 117 MMOL/L (ref 98–107)
CO2 SERPL-SCNC: 21 MMOL/L (ref 22–29)
CO2 SERPL-SCNC: 22 MMOL/L (ref 22–29)
CREAT SERPL-MCNC: 0.5 MG/DL (ref 0.5–1)
CREAT SERPL-MCNC: 0.5 MG/DL (ref 0.5–1)
EOSINOPHIL # BLD: 0 K/UL (ref 0.05–0.5)
EOSINOPHILS RELATIVE PERCENT: 0 % (ref 0–6)
ERYTHROCYTE [DISTWIDTH] IN BLOOD BY AUTOMATED COUNT: 17.2 % (ref 11.5–15)
GFR SERPL CREATININE-BSD FRML MDRD: >60 ML/MIN/1.73M2
GFR SERPL CREATININE-BSD FRML MDRD: >60 ML/MIN/1.73M2
GLUCOSE BLD-MCNC: 128 MG/DL (ref 74–99)
GLUCOSE BLD-MCNC: 130 MG/DL (ref 74–99)
GLUCOSE BLD-MCNC: 141 MG/DL (ref 74–99)
GLUCOSE BLD-MCNC: 154 MG/DL (ref 74–99)
GLUCOSE BLD-MCNC: 178 MG/DL (ref 74–99)
GLUCOSE SERPL-MCNC: 131 MG/DL (ref 74–99)
GLUCOSE SERPL-MCNC: 151 MG/DL (ref 74–99)
HCT VFR BLD AUTO: 27.4 % (ref 34–48)
HCT VFR BLD AUTO: 29.1 % (ref 34–48)
HGB BLD-MCNC: 8.5 G/DL (ref 11.5–15.5)
HGB BLD-MCNC: 9.2 G/DL (ref 11.5–15.5)
INR PPP: 1.4
LYMPHOCYTES NFR BLD: 0.7 K/UL (ref 1.5–4)
LYMPHOCYTES RELATIVE PERCENT: 4 % (ref 20–42)
MAGNESIUM SERPL-MCNC: 2 MG/DL (ref 1.6–2.6)
MAGNESIUM SERPL-MCNC: 2.1 MG/DL (ref 1.6–2.6)
MAGNESIUM SERPL-MCNC: 2.2 MG/DL (ref 1.6–2.6)
MCH RBC QN AUTO: 28 PG (ref 26–35)
MCHC RBC AUTO-ENTMCNC: 31 G/DL (ref 32–34.5)
MCV RBC AUTO: 90.1 FL (ref 80–99.9)
MICROORGANISM SPEC CULT: NO GROWTH
MICROORGANISM SPEC CULT: NORMAL
MICROORGANISM/AGENT SPEC: ABNORMAL
MICROORGANISM/AGENT SPEC: NORMAL
MIXING STUDY: NORMAL
MONOCYTES NFR BLD: 0.53 K/UL (ref 0.1–0.95)
MONOCYTES NFR BLD: 3 % (ref 2–12)
NEUTROPHILS NFR BLD: 94 % (ref 43–80)
NEUTS SEG NFR BLD: 18.97 K/UL (ref 1.8–7.3)
PHOSPHATE SERPL-MCNC: 1.9 MG/DL (ref 2.5–4.5)
PHOSPHATE SERPL-MCNC: 2 MG/DL (ref 2.5–4.5)
PHOSPHATE SERPL-MCNC: 2.4 MG/DL (ref 2.5–4.5)
PLATELET # BLD AUTO: 432 K/UL (ref 130–450)
PMV BLD AUTO: 9.6 FL (ref 7–12)
POTASSIUM SERPL-SCNC: 3.8 MMOL/L (ref 3.5–5)
POTASSIUM SERPL-SCNC: 4.9 MMOL/L (ref 3.5–5)
PROTHROMBIN TIME: 15.3 SEC (ref 9.3–12.4)
RBC # BLD AUTO: 3.04 M/UL (ref 3.5–5.5)
RBC # BLD: ABNORMAL 10*6/UL
SODIUM SERPL-SCNC: 144 MMOL/L (ref 132–146)
SODIUM SERPL-SCNC: 146 MMOL/L (ref 132–146)
SPECIMEN DESCRIPTION: ABNORMAL
SPECIMEN DESCRIPTION: NORMAL
SPECIMEN DESCRIPTION: NORMAL
WBC OTHER # BLD: 20.2 K/UL (ref 4.5–11.5)

## 2023-09-21 PROCEDURE — 6360000002 HC RX W HCPCS: Performed by: INTERNAL MEDICINE

## 2023-09-21 PROCEDURE — 84100 ASSAY OF PHOSPHORUS: CPT

## 2023-09-21 PROCEDURE — 83735 ASSAY OF MAGNESIUM: CPT

## 2023-09-21 PROCEDURE — 2580000003 HC RX 258: Performed by: INTERNAL MEDICINE

## 2023-09-21 PROCEDURE — 6370000000 HC RX 637 (ALT 250 FOR IP): Performed by: STUDENT IN AN ORGANIZED HEALTH CARE EDUCATION/TRAINING PROGRAM

## 2023-09-21 PROCEDURE — 6360000002 HC RX W HCPCS: Performed by: STUDENT IN AN ORGANIZED HEALTH CARE EDUCATION/TRAINING PROGRAM

## 2023-09-21 PROCEDURE — 6370000000 HC RX 637 (ALT 250 FOR IP)

## 2023-09-21 PROCEDURE — 82962 GLUCOSE BLOOD TEST: CPT

## 2023-09-21 PROCEDURE — 2580000003 HC RX 258

## 2023-09-21 PROCEDURE — 92526 ORAL FUNCTION THERAPY: CPT

## 2023-09-21 PROCEDURE — C9113 INJ PANTOPRAZOLE SODIUM, VIA: HCPCS | Performed by: STUDENT IN AN ORGANIZED HEALTH CARE EDUCATION/TRAINING PROGRAM

## 2023-09-21 PROCEDURE — 71045 X-RAY EXAM CHEST 1 VIEW: CPT

## 2023-09-21 PROCEDURE — 2580000003 HC RX 258: Performed by: STUDENT IN AN ORGANIZED HEALTH CARE EDUCATION/TRAINING PROGRAM

## 2023-09-21 PROCEDURE — 85610 PROTHROMBIN TIME: CPT

## 2023-09-21 PROCEDURE — 2500000003 HC RX 250 WO HCPCS

## 2023-09-21 PROCEDURE — 6370000000 HC RX 637 (ALT 250 FOR IP): Performed by: FAMILY MEDICINE

## 2023-09-21 PROCEDURE — 2000000000 HC ICU R&B

## 2023-09-21 PROCEDURE — 94640 AIRWAY INHALATION TREATMENT: CPT

## 2023-09-21 PROCEDURE — 80048 BASIC METABOLIC PNL TOTAL CA: CPT

## 2023-09-21 PROCEDURE — 99233 SBSQ HOSP IP/OBS HIGH 50: CPT | Performed by: INTERNAL MEDICINE

## 2023-09-21 PROCEDURE — 2580000003 HC RX 258: Performed by: FAMILY MEDICINE

## 2023-09-21 PROCEDURE — 85025 COMPLETE CBC W/AUTO DIFF WBC: CPT

## 2023-09-21 PROCEDURE — 2700000000 HC OXYGEN THERAPY PER DAY

## 2023-09-21 PROCEDURE — 2500000003 HC RX 250 WO HCPCS: Performed by: STUDENT IN AN ORGANIZED HEALTH CARE EDUCATION/TRAINING PROGRAM

## 2023-09-21 PROCEDURE — 85018 HEMOGLOBIN: CPT

## 2023-09-21 PROCEDURE — 85014 HEMATOCRIT: CPT

## 2023-09-21 PROCEDURE — 83615 LACTATE (LD) (LDH) ENZYME: CPT

## 2023-09-21 PROCEDURE — A4216 STERILE WATER/SALINE, 10 ML: HCPCS | Performed by: STUDENT IN AN ORGANIZED HEALTH CARE EDUCATION/TRAINING PROGRAM

## 2023-09-21 PROCEDURE — 6370000000 HC RX 637 (ALT 250 FOR IP): Performed by: INTERNAL MEDICINE

## 2023-09-21 PROCEDURE — 92610 EVALUATE SWALLOWING FUNCTION: CPT

## 2023-09-21 PROCEDURE — 36592 COLLECT BLOOD FROM PICC: CPT

## 2023-09-21 RX ORDER — GUAIFENESIN 200 MG/10ML
400 LIQUID ORAL EVERY 12 HOURS
Status: DISCONTINUED | OUTPATIENT
Start: 2023-09-21 | End: 2023-10-05 | Stop reason: HOSPADM

## 2023-09-21 RX ORDER — BUDESONIDE 0.5 MG/2ML
0.5 INHALANT ORAL ONCE
Status: COMPLETED | OUTPATIENT
Start: 2023-09-21 | End: 2023-09-21

## 2023-09-21 RX ORDER — POLYETHYLENE GLYCOL 3350 17 G/17G
17 POWDER, FOR SOLUTION ORAL DAILY
Status: DISCONTINUED | OUTPATIENT
Start: 2023-09-21 | End: 2023-10-05 | Stop reason: HOSPADM

## 2023-09-21 RX ORDER — IPRATROPIUM BROMIDE AND ALBUTEROL SULFATE 2.5; .5 MG/3ML; MG/3ML
1 SOLUTION RESPIRATORY (INHALATION) ONCE
Status: COMPLETED | OUTPATIENT
Start: 2023-09-21 | End: 2023-09-21

## 2023-09-21 RX ADMIN — SODIUM PHOSPHATE, MONOBASIC, MONOHYDRATE AND SODIUM PHOSPHATE, DIBASIC, ANHYDROUS 15 MMOL: 276; 142 INJECTION, SOLUTION INTRAVENOUS at 07:42

## 2023-09-21 RX ADMIN — POLYETHYLENE GLYCOL 3350 17 G: 17 POWDER, FOR SOLUTION ORAL at 10:22

## 2023-09-21 RX ADMIN — PIPERACILLIN AND TAZOBACTAM 3375 MG: 3; .375 INJECTION, POWDER, LYOPHILIZED, FOR SOLUTION INTRAVENOUS at 21:04

## 2023-09-21 RX ADMIN — WATER 5 MG: 1 INJECTION INTRAMUSCULAR; INTRAVENOUS; SUBCUTANEOUS at 07:33

## 2023-09-21 RX ADMIN — POTASSIUM PHOSPHATE, MONOBASIC AND POTASSIUM PHOSPHATE, DIBASIC 15 MMOL: 224; 236 INJECTION, SOLUTION, CONCENTRATE INTRAVENOUS at 22:31

## 2023-09-21 RX ADMIN — MEMANTINE 10 MG: 10 TABLET ORAL at 20:57

## 2023-09-21 RX ADMIN — BUDESONIDE 500 MCG: 0.5 SUSPENSION RESPIRATORY (INHALATION) at 13:18

## 2023-09-21 RX ADMIN — ALTEPLASE 10 MG: 2.2 INJECTION, POWDER, LYOPHILIZED, FOR SOLUTION INTRAVENOUS at 07:33

## 2023-09-21 RX ADMIN — PETROLATUM: 420 OINTMENT TOPICAL at 08:46

## 2023-09-21 RX ADMIN — GUAIFENESIN 400 MG: 200 SOLUTION ORAL at 11:31

## 2023-09-21 RX ADMIN — THIAMINE HYDROCHLORIDE 100 MG: 100 INJECTION, SOLUTION INTRAMUSCULAR; INTRAVENOUS at 08:46

## 2023-09-21 RX ADMIN — SODIUM CHLORIDE, PRESERVATIVE FREE 40 MG: 5 INJECTION INTRAVENOUS at 20:57

## 2023-09-21 RX ADMIN — VALPROIC ACID 250 MG: 250 SOLUTION ORAL at 20:57

## 2023-09-21 RX ADMIN — SODIUM CHLORIDE, PRESERVATIVE FREE 40 MG: 5 INJECTION INTRAVENOUS at 08:44

## 2023-09-21 RX ADMIN — PETROLATUM: 420 OINTMENT TOPICAL at 20:58

## 2023-09-21 RX ADMIN — ALTEPLASE 10 MG: 2.2 INJECTION, POWDER, LYOPHILIZED, FOR SOLUTION INTRAVENOUS at 18:42

## 2023-09-21 RX ADMIN — MEMANTINE 10 MG: 10 TABLET ORAL at 08:44

## 2023-09-21 RX ADMIN — WATER 5 MG: 1 INJECTION INTRAMUSCULAR; INTRAVENOUS; SUBCUTANEOUS at 18:42

## 2023-09-21 RX ADMIN — PIPERACILLIN AND TAZOBACTAM 3375 MG: 3; .375 INJECTION, POWDER, LYOPHILIZED, FOR SOLUTION INTRAVENOUS at 12:49

## 2023-09-21 RX ADMIN — WATER 50 MG: 1 INJECTION INTRAMUSCULAR; INTRAVENOUS; SUBCUTANEOUS at 17:51

## 2023-09-21 RX ADMIN — GUAIFENESIN 400 MG: 200 SOLUTION ORAL at 22:57

## 2023-09-21 RX ADMIN — COLLAGENASE SANTYL: 250 OINTMENT TOPICAL at 08:46

## 2023-09-21 RX ADMIN — WATER 50 MG: 1 INJECTION INTRAMUSCULAR; INTRAVENOUS; SUBCUTANEOUS at 06:18

## 2023-09-21 RX ADMIN — VALPROIC ACID 250 MG: 250 SOLUTION ORAL at 15:22

## 2023-09-21 RX ADMIN — PIPERACILLIN AND TAZOBACTAM 3375 MG: 3; .375 INJECTION, POWDER, LYOPHILIZED, FOR SOLUTION INTRAVENOUS at 04:34

## 2023-09-21 RX ADMIN — MIRTAZAPINE 30 MG: 15 TABLET, FILM COATED ORAL at 20:57

## 2023-09-21 RX ADMIN — VALPROIC ACID 250 MG: 250 SOLUTION ORAL at 08:46

## 2023-09-21 RX ADMIN — SODIUM CHLORIDE, PRESERVATIVE FREE 10 ML: 5 INJECTION INTRAVENOUS at 20:58

## 2023-09-21 RX ADMIN — ATORVASTATIN CALCIUM 10 MG: 10 TABLET, FILM COATED ORAL at 08:44

## 2023-09-21 RX ADMIN — IPRATROPIUM BROMIDE AND ALBUTEROL SULFATE 1 DOSE: .5; 3 SOLUTION RESPIRATORY (INHALATION) at 13:18

## 2023-09-21 ASSESSMENT — PAIN SCALES - PAIN ASSESSMENT IN ADVANCED DEMENTIA (PAINAD)
FACIALEXPRESSION: 0
BREATHING: 0
NEGVOCALIZATION: 0
TOTALSCORE: 0
FACIALEXPRESSION: 0
BREATHING: 0
BREATHING: 0
BODYLANGUAGE: 0
NEGVOCALIZATION: 0
CONSOLABILITY: 0
NEGVOCALIZATION: 0
BODYLANGUAGE: 0
BREATHING: 0
FACIALEXPRESSION: 0
CONSOLABILITY: 0
FACIALEXPRESSION: 0
BODYLANGUAGE: 0
NEGVOCALIZATION: 0
CONSOLABILITY: 0
TOTALSCORE: 0
FACIALEXPRESSION: 0
TOTALSCORE: 0
TOTALSCORE: 0
BODYLANGUAGE: 0
NEGVOCALIZATION: 0
CONSOLABILITY: 0
CONSOLABILITY: 0
BODYLANGUAGE: 0
TOTALSCORE: 0
BREATHING: 0

## 2023-09-21 ASSESSMENT — PAIN SCALES - GENERAL
PAINLEVEL_OUTOF10: 0
PAINLEVEL_OUTOF10: 0

## 2023-09-21 NOTE — FLOWSHEET NOTE
Inpatient Wound Care(follow up) 4419    Admit Date: 9/18/2023 11:02 AM    Reason for consult:  right elbow    Findings:     09/21/23 1017   Wound 09/21/23 Elbow Right   Date First Assessed/Time First Assessed: 09/21/23 1017   Present on Original Admission: No  Location: Elbow  Wound Location Orientation: Right   Wound Image    Dressing/Treatment   (nurse to apply)   Wound Length (cm) 1 cm   Wound Width (cm) 1 cm   Wound Depth (cm) 0.1 cm   Wound Surface Area (cm^2) 1 cm^2   Wound Volume (cm^3) 0.1 cm^3   Wound Assessment Pink/red  (skin peeling off)   Drainage Amount None (dry)   Shannan-wound Assessment Ecchymosis   Wound Thickness Description not for Pressure Injury Partial thickness       **Informed Consent**    photos taken of right elbow and inserted into their chart as part of their permanent medical record for purposes of documentation, treatment management and/or medical review. All Images taken on 9/21/23 of patient name: Jesse Arnett were transmitted and stored on secured [employer] located within [facility] by a registered [employer] Mobile Application Device.       Impression:  partial thickness    Plan:  Foam dressing    Stephon Schwab RN 9/21/2023 10:19 AM

## 2023-09-21 NOTE — PROGRESS NOTES
SPEECH/LANGUAGE PATHOLOGY  CLINICAL ASSESSMENT OF SWALLOWING FUNCTION   and PLAN OF CARE    PATIENT NAME:  Nehemias Foy  (female)     MRN:  [id number]    :  1940  (79 y.o.)  STATUS:  Inpatient: Room 4419/4419-A    TODAY'S DATE:  2023  REFERRING PROVIDER:    SPECIFIC PROVIDER ORDER: SLP swallowing-dysphagia evaluation and treatment Date of order:  23  REASON FOR REFERRAL: dysphagia   EVALUATING THERAPIST: Benson Stark, SLP                 RESULTS:    DYSPHAGIA DIAGNOSIS:   Clinical indicators of mild-moderate oropharyngeal phase dysphagia       DIET RECOMMENDATIONS:  Pureed consistency solids (IDDSI level 4) with  thin liquids (IDDSI level 0)     FEEDING RECOMMENDATIONS:     Assistance level:  Full assistance is needed during all oral intake      Compensatory strategies recommended: No straw        SPEECH THERAPY  PLAN OF CARE   The dysphagia POC is established based on physician order, dysphagia diagnosis and results of clinical assessment     Skilled SLP intervention for dysphagia management up to 6 x per week until goals met, pt plateaus in function and/or discharged from hospital    Conditions Requiring Skilled Therapeutic Intervention for dysphagia:    Patient is performing below functional baseline d/t  current acute condition, Multiple diagnoses, multiple medications, and increased dependency upon caregivers. Specific dysphagia interventions to include:     Trials of upgraded diet/liquid     Specific instructions for next treatment:  ongoing PO analysis to upgrade diet and evaluate tolerance of current PO recommendation  Patient Treatment Goals:    Short Term Goals:  Pt will complete PO trials of upgraded diet textures with SLP only to determine the least restrictive PO diet to maintain adequate nutrition/hydration with no more than 1 overt s/s of pen/asp.     Long Term Goals:   Pt will improve oropharyngeal swallow function to ensure airway protection during PO intake to maintain

## 2023-09-21 NOTE — PROGRESS NOTES
Continues to improve, saturating 100% on 8 liters. More awake and answering some questions. Denies dyspnea. Additional Respiratory Assessments  Pulse: 75  Respirations: 16  SpO2: 100 %      Current Facility-Administered Medications:     sodium phosphate 15 mmol in sodium chloride 0.9 % 250 mL IVPB, 15 mmol, IntraVENous, Once, Michelle Galvez MD    alteplase (CATHFLO) 10 mg in sodium chloride 0.9 % 30 mL, 10 mg, IntraPLEUral, Q12H **AND** dornase alpha (PULMOZYME) 5 mg in sterile water 30 mL, 5 mg, IntraPLEUral, Q12H, Priscilla Sheets MD    0.9 % sodium chloride infusion, , IntraVENous, PRN, Fletcher Langley MD    hydrocortisone sodium succinate PF (SOLU-CORTEF) 50 mg in sterile water 2 mL injection, 50 mg, IntraVENous, Q8H, Alee Chao MD, 50 mg at 09/21/23 7537    docusate sodium (COLACE) capsule 100 mg, 100 mg, Oral, BID, Alee Chao MD    thiamine (B-1) injection 100 mg, 100 mg, IntraVENous, Daily, Alee Chao MD, 100 mg at 09/20/23 1515    white petrolatum ointment, , Topical, BID, Given at 09/20/23 2244 **AND** white petrolatum ointment, , Topical, TID PRN, Arline Rebolledo MD    collagenase ointment, , Topical, Daily, Arline Rebolledo MD    valproic acid (DEPAKENE) 250 MG/5ML oral solution 250 mg, 250 mg, Oral, TID, Priscilla Sheets MD, 250 mg at 09/20/23 1824    pantoprazole (PROTONIX) 40 mg in sodium chloride (PF) 0.9 % 10 mL injection, 40 mg, IntraVENous, Q12H, Alee Chao MD, 40 mg at 09/20/23 2017    glucose chewable tablet 16 g, 4 tablet, Oral, PRN, Alee Chao MD    dextrose bolus 10% 125 mL, 125 mL, IntraVENous, PRN **OR** dextrose bolus 10% 250 mL, 250 mL, IntraVENous, PRN, Alee Chao MD    glucagon injection 1 mg, 1 mg, SubCUTAneous, PRN, Alee Chao MD    dextrose 10 % infusion, , IntraVENous, Continuous PRN, Alee Chao MD    insulin lispro (HUMALOG) injection vial 0-4 Units, 0-4 Units, SubCUTAneous, Q6H, Alee Chao MD

## 2023-09-21 NOTE — PLAN OF CARE
Problem: Discharge Planning  Goal: Discharge to home or other facility with appropriate resources  9/20/2023 2054 by Maged Winkler RN  Outcome: Progressing  Flowsheets (Taken 9/20/2023 2054)  Discharge to home or other facility with appropriate resources:   Arrange for needed discharge resources and transportation as appropriate   Identify barriers to discharge with patient and caregiver  9/20/2023 2053 by Maged Winkler RN  Outcome: [place] (Taken 9/19/2023 2228 by Yong Sung RN)  Discharge to home or other facility with appropriate resources: Identify barriers to discharge with patient and caregiver     Problem: Safety - Adult  Goal: Free from fall injury  9/20/2023 2054 by Maged Winkler RN  Outcome: [facility] (Taken 9/19/2023 2228 by Yong Sung, RN)  Free From Fall Injury: Instruct family/caregiver on patient safety  9/20/2023 2053 by Maged Winkler RN  Outcome: Progressing  Flowsheets (Taken 9/19/2023 2228 by Yong Sung RN)  Free From Fall Injury: Instruct family/caregiver on patient safety  9/20/2023 0818 by Carissa Berman RN  Outcome: Progressing     Problem: Skin/Tissue Integrity  Goal: Absence of new skin breakdown  Description: 1. Monitor for areas of redness and/or skin breakdown  2. Assess vascular access sites hourly  3. Every 4-6 hours minimum:  Change oxygen saturation probe site  4. Every 4-6 hours:  If on nasal continuous positive airway pressure, respiratory therapy assess nares and determine need for appliance change or resting period.   9/20/2023 2054 by Maged Winkler RN  Outcome: Progressing  9/20/2023 2053 by Maged Winkler RN  Outcome: Progressing  9/20/2023 0818 by Carissa Berman RN  Outcome: Progressing     Problem: Pain  Goal: Verbalizes/displays adequate comfort level or baseline comfort level  9/20/2023 2054 by Maged Winkler RN  Outcome: Progressing  Flowsheets (Taken 9/20/2023

## 2023-09-21 NOTE — CARE COORDINATION
Care Coordination: LOS 3 day. Pt s/p IR for US guided CT placement-800 cc pleural fluid removed. Corpak placed per IV team, wound care following. Per pulm note, probable empyema- continue pigtail drainage, to start intrapleural thrombolytics and dornase jd. ID following, continue Zosyn q8, 8 ltr nc, marivel arias. Per Nkechi Foy at [facility], long term but would prefer to have therapy prior to dc. Per POA, plan to return to facility at discharge.  DARRION and transport envelope completed    Electronically signed by Serina Grossman RN on 9/21/2023 at 10:23 AM

## 2023-09-21 NOTE — PROGRESS NOTES
Department of Internal Medicine  Infectious Diseases   Progress  Note      C/C :  Empyema (?), leukocytosis     Pt is awake and alert  Denies fever,   Reports SOB   Afebrile       Current Facility-Administered Medications   Medication Dose Route Frequency Provider Last Rate Last Admin    sodium phosphate 15 mmol in sodium chloride 0.9 % 250 mL IVPB  15 mmol IntraVENous Once Michelle Ricardo MD 62.5 mL/hr at 09/21/23 0742 15 mmol at 09/21/23 0742    alteplase (CATHFLO) 10 mg in sodium chloride 0.9 % 30 mL  10 mg IntraPLEUral Q12H Td Galeano MD   10 mg at 09/21/23 0864    And    dornase alpha (PULMOZYME) 5 mg in sterile water 30 mL  5 mg IntraPLEUral Q12H dT Galeano MD   5 mg at 09/21/23 0733    hydrocortisone sodium succinate PF (SOLU-CORTEF) 50 mg in sterile water 2 mL injection  50 mg IntraVENous Q12H Moises Byrne MD        polyethylene glycol (GLYCOLAX) packet 17 g  17 g Oral Daily Moises Byrne MD   17 g at 09/21/23 1022    ipratropium 0.5 mg-albuterol 2.5 mg (DUONEB) nebulizer solution 1 Dose  1 Dose Inhalation Once Moises Byrne MD        budesonide (PULMICORT) nebulizer suspension 500 mcg  0.5 mg Nebulization Once Moises Byrne MD        guaiFENesin (ROBITUSSIN) 100 MG/5ML liquid 400 mg  400 mg Oral Q12H Moises Byrne MD        0.9 % sodium chloride infusion   IntraVENous PRN Michelle Ricardo MD        thiamine (B-1) injection 100 mg  100 mg IntraVENous Daily Moises Byrne MD   100 mg at 09/21/23 0846    white petrolatum ointment   Topical BID Bryn Schirmer, MD   Given at 09/21/23 4741    And    white petrolatum ointment   Topical TID PRN Bryn Schirmer, MD        collagenase ointment   Topical Daily Bryn Schirmer, MD   Given at 09/21/23 0846    valproic acid (DEPAKENE) 250 MG/5ML oral solution 250 mg  250 mg Oral TID Td Galeano MD   250 mg at 09/21/23 0846    pantoprazole (PROTONIX) 40 mg in sodium chloride (PF) 0.9 % 10 mL \"PO2ART\", \"[id number]\"    MICROBIOLOGY:    Blood culture - neg to date     Pleural fluid cx -    Specimen: Body Fluid Updated: 09/21/23 0816    Specimen Description . BODY FLUID    Direct Exam Gram stain performed on unspun fluid. NO Polymorphonuclear leukocytes     RARE EPITHELIAL CELLS Abnormal      NO ORGANISMS SEEN    Culture NO GROWTH   Culture, MRSA, Screening [[id number]]          Radiology :    CT chest -  Impression:        1. No evidence of pulmonary embolism. 2. Extremely large right pleural effusion with partially loculated anterior   hydropneumothorax component, possibly related to superimposed empyema at the   anterior right base and extending to the anterior inferior right pulmonary   hilum. 3. Complete collapse of the right lung with mild leftward shift of the cardio   mediastinum secondary to #2 above. IMPRESSION:     Right pleural effusion-( exudative )  r/o empyema s/p pig tail drainage ( 9/20)   Leukocytosis - 20 K     RECOMMENDATIONS:       1. IV zosyn  3.375 grams q 8 hrs  2.  Await cx,  CBC with diff

## 2023-09-21 NOTE — PROGRESS NOTES
Comprehensive Nutrition Assessment    Type and Reason for Visit:  Initial, Positive Nutrition Screen, Consult (TF Recs)    Nutrition Recommendations/Plan:   Continue NPO. Highly Recommend to Modify Current EN to appropriately meet estimated needs & monitor (Current EN highly exceeds estimated Protein/Fluid Needs at this time). TF Recommendations:  Diabetic (Glucerna 1.5) @ 45ml/hr (Goal) Continuous x 24hrs/d= 1080ml TV, 1620kcal, 89gm Pro, 820ml free water. Flush per critical care mgmt; however if needed, recommend 150ml flush q 6h= 1420ml total water (TF+Flush). Malnutrition Assessment:  Malnutrition Status:  Insufficient data (09/21/23 1301)    Context:  Acute Illness     Findings of the 6 clinical characteristics of malnutrition:  Energy Intake:  Mild decrease in energy intake (Comment) (since adm)  Weight Loss:  Unable to assess     Body Fat Loss:  Unable to assess     Muscle Mass Loss:  Unable to assess    Fluid Accumulation:  Unable to assess     Strength:  Not Performed    Nutrition Assessment:    Pt adm from SNF w/ SOB/hypoxia x ~1d pta. PMHx Dementia, Colitis, DM. Adm w/ AHRF, Septic Shock, Rt Pleural Effusion, suspected Empyema, s/p Rt Pigtail Insert and Corpak 9/20. At risk d/t poor intake and ongoing NPO status w/ need for EN support 2/2 Dementia/Dysphagia. Also w/ increased needs for healing of multiple wounds. Will provide EN rec's to meet current estimated needs and monitor. Nutrition Related Findings:     AMSx3, upper dentures, Abd WDL, Hypo BS, +Corpak w/ TF, +1 edema, no pressors, +I/O's, hyperglycemia Wound Type: Multiple, Deep Tissue Injury, Pressure Injury, Stage III, Partial Thickness, Open Wounds       Current Nutrition Intake & Therapies:    Average Meal Intake: NPO  Average Supplements Intake: NPO  Diet NPO  ADULT TUBE FEEDING; Nasoenteric; Peptide Based High Protein; Continuous; 10; Yes; 10; Q 4 hours; 55; 100; Q 1 hour; Protein; BID  Current Tube Feeding (TF)

## 2023-09-22 ENCOUNTER — APPOINTMENT (OUTPATIENT)
Dept: GENERAL RADIOLOGY | Age: 83
End: 2023-09-22
Payer: MEDICARE

## 2023-09-22 LAB
ANION GAP SERPL CALCULATED.3IONS-SCNC: 6 MMOL/L (ref 7–16)
B.E.: -1.6 MMOL/L (ref -3–3)
BUN SERPL-MCNC: 15 MG/DL (ref 6–23)
CALCIUM SERPL-MCNC: 8.1 MG/DL (ref 8.6–10.2)
CHLORIDE SERPL-SCNC: 110 MMOL/L (ref 98–107)
CO2 SERPL-SCNC: 23 MMOL/L (ref 22–29)
COHB: 1 % (ref 0–1.5)
COMMENT: ABNORMAL
CREAT SERPL-MCNC: 0.4 MG/DL (ref 0.5–1)
CRITICAL: ABNORMAL
DATE ANALYZED: ABNORMAL
DATE OF COLLECTION: ABNORMAL
GFR SERPL CREATININE-BSD FRML MDRD: >60 ML/MIN/1.73M2
GLUCOSE BLD-MCNC: 143 MG/DL (ref 74–99)
GLUCOSE BLD-MCNC: 165 MG/DL (ref 74–99)
GLUCOSE BLD-MCNC: 165 MG/DL (ref 74–99)
GLUCOSE BLD-MCNC: 179 MG/DL (ref 74–99)
GLUCOSE SERPL-MCNC: 147 MG/DL (ref 74–99)
HCO3: 20.6 MMOL/L (ref 22–26)
HCT VFR BLD AUTO: 26.1 % (ref 34–48)
HGB BLD-MCNC: 8.4 G/DL (ref 11.5–15.5)
HHB: 10.3 % (ref 0–5)
LAB: ABNORMAL
LDH SERPL-CCNC: <10 U/L (ref 135–214)
Lab: 805
MAGNESIUM SERPL-MCNC: 1.8 MG/DL (ref 1.6–2.6)
MAGNESIUM SERPL-MCNC: 1.9 MG/DL (ref 1.6–2.6)
METHB: 0.1 % (ref 0–1.5)
MODE: ABNORMAL
NON-GYN CYTOLOGY REPORT: NORMAL
O2 CONTENT: 13.4 ML/DL
O2 SATURATION: 89.6 % (ref 92–98.5)
O2HB: 88.6 % (ref 94–97)
OPERATOR ID: ABNORMAL
PATIENT TEMP: 37 C
PCO2: 27 MMHG (ref 35–45)
PH BLOOD GAS: 7.5 (ref 7.35–7.45)
PHOSPHATE SERPL-MCNC: 2.5 MG/DL (ref 2.5–4.5)
PHOSPHATE SERPL-MCNC: 2.9 MG/DL (ref 2.5–4.5)
PO2: 55 MMHG (ref 75–100)
POTASSIUM SERPL-SCNC: 3.5 MMOL/L (ref 3.5–5)
SODIUM SERPL-SCNC: 139 MMOL/L (ref 132–146)
SOURCE, BLOOD GAS: ABNORMAL
THB: 10.7 G/DL (ref 11.5–16.5)
TIME ANALYZED: 814
VALPROATE FREE SERPL-MCNC: 8.5 UG/ML (ref 7–23)

## 2023-09-22 PROCEDURE — 6370000000 HC RX 637 (ALT 250 FOR IP): Performed by: CHIROPRACTOR

## 2023-09-22 PROCEDURE — 6360000002 HC RX W HCPCS: Performed by: INTERNAL MEDICINE

## 2023-09-22 PROCEDURE — 2580000003 HC RX 258: Performed by: STUDENT IN AN ORGANIZED HEALTH CARE EDUCATION/TRAINING PROGRAM

## 2023-09-22 PROCEDURE — 80048 BASIC METABOLIC PNL TOTAL CA: CPT

## 2023-09-22 PROCEDURE — 2500000003 HC RX 250 WO HCPCS: Performed by: STUDENT IN AN ORGANIZED HEALTH CARE EDUCATION/TRAINING PROGRAM

## 2023-09-22 PROCEDURE — 6370000000 HC RX 637 (ALT 250 FOR IP): Performed by: STUDENT IN AN ORGANIZED HEALTH CARE EDUCATION/TRAINING PROGRAM

## 2023-09-22 PROCEDURE — 99233 SBSQ HOSP IP/OBS HIGH 50: CPT | Performed by: INTERNAL MEDICINE

## 2023-09-22 PROCEDURE — 82805 BLOOD GASES W/O2 SATURATION: CPT

## 2023-09-22 PROCEDURE — 83735 ASSAY OF MAGNESIUM: CPT

## 2023-09-22 PROCEDURE — 6370000000 HC RX 637 (ALT 250 FOR IP)

## 2023-09-22 PROCEDURE — 71045 X-RAY EXAM CHEST 1 VIEW: CPT

## 2023-09-22 PROCEDURE — 2580000003 HC RX 258: Performed by: INTERNAL MEDICINE

## 2023-09-22 PROCEDURE — 2580000003 HC RX 258: Performed by: FAMILY MEDICINE

## 2023-09-22 PROCEDURE — 6360000002 HC RX W HCPCS: Performed by: STUDENT IN AN ORGANIZED HEALTH CARE EDUCATION/TRAINING PROGRAM

## 2023-09-22 PROCEDURE — 84100 ASSAY OF PHOSPHORUS: CPT

## 2023-09-22 PROCEDURE — 6370000000 HC RX 637 (ALT 250 FOR IP): Performed by: INTERNAL MEDICINE

## 2023-09-22 PROCEDURE — 36600 WITHDRAWAL OF ARTERIAL BLOOD: CPT

## 2023-09-22 PROCEDURE — 2700000000 HC OXYGEN THERAPY PER DAY

## 2023-09-22 PROCEDURE — 6360000002 HC RX W HCPCS: Performed by: CHIROPRACTOR

## 2023-09-22 PROCEDURE — 85018 HEMOGLOBIN: CPT

## 2023-09-22 PROCEDURE — 2000000000 HC ICU R&B

## 2023-09-22 PROCEDURE — C9113 INJ PANTOPRAZOLE SODIUM, VIA: HCPCS | Performed by: STUDENT IN AN ORGANIZED HEALTH CARE EDUCATION/TRAINING PROGRAM

## 2023-09-22 PROCEDURE — 82962 GLUCOSE BLOOD TEST: CPT

## 2023-09-22 PROCEDURE — 85014 HEMATOCRIT: CPT

## 2023-09-22 PROCEDURE — A4216 STERILE WATER/SALINE, 10 ML: HCPCS | Performed by: STUDENT IN AN ORGANIZED HEALTH CARE EDUCATION/TRAINING PROGRAM

## 2023-09-22 PROCEDURE — 6370000000 HC RX 637 (ALT 250 FOR IP): Performed by: FAMILY MEDICINE

## 2023-09-22 RX ORDER — METOPROLOL TARTRATE 50 MG/1
50 TABLET, FILM COATED ORAL 2 TIMES DAILY
Status: DISCONTINUED | OUTPATIENT
Start: 2023-09-22 | End: 2023-10-05 | Stop reason: HOSPADM

## 2023-09-22 RX ORDER — MAGNESIUM SULFATE 1 G/100ML
1000 INJECTION INTRAVENOUS ONCE
Status: COMPLETED | OUTPATIENT
Start: 2023-09-22 | End: 2023-09-22

## 2023-09-22 RX ORDER — FUROSEMIDE 10 MG/ML
40 INJECTION INTRAMUSCULAR; INTRAVENOUS ONCE
Status: COMPLETED | OUTPATIENT
Start: 2023-09-22 | End: 2023-09-22

## 2023-09-22 RX ADMIN — VALPROIC ACID 250 MG: 250 SOLUTION ORAL at 15:26

## 2023-09-22 RX ADMIN — VALPROIC ACID 250 MG: 250 SOLUTION ORAL at 09:07

## 2023-09-22 RX ADMIN — MEMANTINE 10 MG: 10 TABLET ORAL at 09:07

## 2023-09-22 RX ADMIN — WATER 50 MG: 1 INJECTION INTRAMUSCULAR; INTRAVENOUS; SUBCUTANEOUS at 17:42

## 2023-09-22 RX ADMIN — GUAIFENESIN 400 MG: 200 SOLUTION ORAL at 23:01

## 2023-09-22 RX ADMIN — PETROLATUM: 420 OINTMENT TOPICAL at 21:07

## 2023-09-22 RX ADMIN — SODIUM CHLORIDE, PRESERVATIVE FREE 10 ML: 5 INJECTION INTRAVENOUS at 09:09

## 2023-09-22 RX ADMIN — FUROSEMIDE 40 MG: 10 INJECTION, SOLUTION INTRAMUSCULAR; INTRAVENOUS at 09:17

## 2023-09-22 RX ADMIN — POLYETHYLENE GLYCOL 3350 17 G: 17 POWDER, FOR SOLUTION ORAL at 09:06

## 2023-09-22 RX ADMIN — PIPERACILLIN AND TAZOBACTAM 3375 MG: 3; .375 INJECTION, POWDER, LYOPHILIZED, FOR SOLUTION INTRAVENOUS at 19:58

## 2023-09-22 RX ADMIN — SODIUM CHLORIDE, PRESERVATIVE FREE 40 MG: 5 INJECTION INTRAVENOUS at 20:58

## 2023-09-22 RX ADMIN — SODIUM CHLORIDE, PRESERVATIVE FREE 40 MG: 5 INJECTION INTRAVENOUS at 09:06

## 2023-09-22 RX ADMIN — ATORVASTATIN CALCIUM 10 MG: 10 TABLET, FILM COATED ORAL at 09:07

## 2023-09-22 RX ADMIN — SODIUM CHLORIDE, PRESERVATIVE FREE 10 ML: 5 INJECTION INTRAVENOUS at 20:53

## 2023-09-22 RX ADMIN — WATER 50 MG: 1 INJECTION INTRAMUSCULAR; INTRAVENOUS; SUBCUTANEOUS at 06:30

## 2023-09-22 RX ADMIN — METOPROLOL TARTRATE 50 MG: 50 TABLET ORAL at 20:52

## 2023-09-22 RX ADMIN — ASPIRIN 81 MG CHEWABLE TABLET 81 MG: 81 TABLET CHEWABLE at 09:07

## 2023-09-22 RX ADMIN — PIPERACILLIN AND TAZOBACTAM 3375 MG: 3; .375 INJECTION, POWDER, LYOPHILIZED, FOR SOLUTION INTRAVENOUS at 12:01

## 2023-09-22 RX ADMIN — ALTEPLASE 10 MG: 2.2 INJECTION, POWDER, LYOPHILIZED, FOR SOLUTION INTRAVENOUS at 07:42

## 2023-09-22 RX ADMIN — MAGNESIUM SULFATE HEPTAHYDRATE 1000 MG: 1 INJECTION, SOLUTION INTRAVENOUS at 21:05

## 2023-09-22 RX ADMIN — WATER 5 MG: 1 INJECTION INTRAMUSCULAR; INTRAVENOUS; SUBCUTANEOUS at 07:42

## 2023-09-22 RX ADMIN — COLLAGENASE SANTYL: 250 OINTMENT TOPICAL at 09:08

## 2023-09-22 RX ADMIN — MEMANTINE 10 MG: 10 TABLET ORAL at 20:52

## 2023-09-22 RX ADMIN — POTASSIUM PHOSPHATE, MONOBASIC AND POTASSIUM PHOSPHATE, DIBASIC 15 MMOL: 224; 236 INJECTION, SOLUTION, CONCENTRATE INTRAVENOUS at 21:06

## 2023-09-22 RX ADMIN — THIAMINE HYDROCHLORIDE 100 MG: 100 INJECTION, SOLUTION INTRAMUSCULAR; INTRAVENOUS at 09:06

## 2023-09-22 RX ADMIN — GUAIFENESIN 400 MG: 200 SOLUTION ORAL at 12:01

## 2023-09-22 RX ADMIN — MIRTAZAPINE 30 MG: 15 TABLET, FILM COATED ORAL at 21:07

## 2023-09-22 RX ADMIN — POTASSIUM BICARBONATE 40 MEQ: 782 TABLET, EFFERVESCENT ORAL at 06:34

## 2023-09-22 RX ADMIN — VALPROIC ACID 250 MG: 250 SOLUTION ORAL at 20:53

## 2023-09-22 RX ADMIN — METOPROLOL TARTRATE 50 MG: 50 TABLET ORAL at 12:20

## 2023-09-22 RX ADMIN — APIXABAN 5 MG: 5 TABLET, FILM COATED ORAL at 20:53

## 2023-09-22 RX ADMIN — PIPERACILLIN AND TAZOBACTAM 3375 MG: 3; .375 INJECTION, POWDER, LYOPHILIZED, FOR SOLUTION INTRAVENOUS at 03:53

## 2023-09-22 RX ADMIN — PETROLATUM: 420 OINTMENT TOPICAL at 09:09

## 2023-09-22 ASSESSMENT — PAIN SCALES - PAIN ASSESSMENT IN ADVANCED DEMENTIA (PAINAD)
CONSOLABILITY: 0
FACIALEXPRESSION: 0
BODYLANGUAGE: 0
NEGVOCALIZATION: 0
TOTALSCORE: 0
BODYLANGUAGE: 0
BREATHING: 0
TOTALSCORE: 0
NEGVOCALIZATION: 0
TOTALSCORE: 0
BREATHING: 0
CONSOLABILITY: 0
CONSOLABILITY: 0
BREATHING: 0
TOTALSCORE: 0
BODYLANGUAGE: 0
NEGVOCALIZATION: 0
BREATHING: 0
CONSOLABILITY: 0
TOTALSCORE: 0
NEGVOCALIZATION: 0
BODYLANGUAGE: 0
CONSOLABILITY: 0
FACIALEXPRESSION: 0
TOTALSCORE: 0
BREATHING: 0
CONSOLABILITY: 0
TOTALSCORE: 0
BREATHING: 0
BODYLANGUAGE: 0
TOTALSCORE: 0
BODYLANGUAGE: 0
BREATHING: 0
FACIALEXPRESSION: 0
NEGVOCALIZATION: 0
NEGVOCALIZATION: 0
BREATHING: 0
NEGVOCALIZATION: 0
TOTALSCORE: 0
CONSOLABILITY: 0
NEGVOCALIZATION: 0
TOTALSCORE: 0
CONSOLABILITY: 0
TOTALSCORE: 0
NEGVOCALIZATION: 0
BREATHING: 0
NEGVOCALIZATION: 0
BREATHING: 0
BODYLANGUAGE: 0
FACIALEXPRESSION: 0
TOTALSCORE: 0
BREATHING: 0
BODYLANGUAGE: 0
FACIALEXPRESSION: 0
CONSOLABILITY: 0
NEGVOCALIZATION: 0
FACIALEXPRESSION: 0
BODYLANGUAGE: 0
FACIALEXPRESSION: 0
CONSOLABILITY: 0
CONSOLABILITY: 0
NEGVOCALIZATION: 0
BREATHING: 0
FACIALEXPRESSION: 0
FACIALEXPRESSION: 0
CONSOLABILITY: 0
BODYLANGUAGE: 0

## 2023-09-22 ASSESSMENT — PAIN SCALES - GENERAL: PAINLEVEL_OUTOF10: 0

## 2023-09-22 NOTE — PLAN OF CARE
I spoke with Jillian Frias, patient's niece and POA, she told me that she decided NO INTUBATION for the patient as she says that is what the patient would have wanted. So as of now, patient code status: [id number].     Electronically signed by Bridget Keenan MD on 9/22/23 at 8:40 AM EDT   Yareli Guzman DO, MPH, [facility], [address] [place] [address]

## 2023-09-22 NOTE — PROGRESS NOTES
OT consult received and appreciated. Chart reviewed. Will hold evaluation this date d/t respiratory status. Will re-attempt at a later time. Thank you.  Enrico Vaughn, OTR/L # [id number]

## 2023-09-22 NOTE — PROGRESS NOTES
Department of Internal Medicine  Infectious Diseases   Progress  Note      C/C :  Empyema (?), leukocytosis     Pt is awake   No distress   Afebrile       Current Facility-Administered Medications   Medication Dose Route Frequency Provider Last Rate Last Admin    metoprolol tartrate (LOPRESSOR) tablet 50 mg  50 mg Oral BID Tabatha Acevedo MD   50 mg at 09/22/23 1220    hydrocortisone sodium succinate PF (SOLU-CORTEF) 50 mg in sterile water 2 mL injection  50 mg IntraVENous Q12H Gato Monroy MD   50 mg at 09/22/23 0630    polyethylene glycol (GLYCOLAX) packet 17 g  17 g Oral Daily Gato Monroy MD   17 g at 09/22/23 0906    guaiFENesin (ROBITUSSIN) 100 MG/5ML liquid 400 mg  400 mg Oral Q12H Gato Monroy MD   400 mg at 09/22/23 1201    0.9 % sodium chloride infusion   IntraVENous PRN Michelle Molina MD        thiamine (B-1) injection 100 mg  100 mg IntraVENous Daily Gato Monroy MD   100 mg at 09/22/23 0906    white petrolatum ointment   Topical BID Sena Bergeron MD   Given at 09/22/23 2696    And    white petrolatum ointment   Topical TID PRN Sena Bergeron MD        collagenase ointment   Topical Daily Sena Bergeron MD   Given at 09/22/23 0908    valproic acid (DEPAKENE) 250 MG/5ML oral solution 250 mg  250 mg Oral TID Taniya Garza MD   250 mg at 09/22/23 0907    pantoprazole (PROTONIX) 40 mg in sodium chloride (PF) 0.9 % 10 mL injection  40 mg IntraVENous Q12H Gato Monroy MD   40 mg at 09/22/23 0906    glucose chewable tablet 16 g  4 tablet Oral PRN Gato Monroy MD        dextrose bolus 10% 125 mL  125 mL IntraVENous PRN Gato Monroy MD        Or    dextrose bolus 10% 250 mL  250 mL IntraVENous PRN Gato Monroy MD        glucagon injection 1 mg  1 mg SubCUTAneous PRN Gato Monroy MD        dextrose 10 % infusion   IntraVENous Continuous PRN Gato Monroy MD        insulin lispro (HUMALOG) injection vial 0-4 Units  0-4 Units SubCUTAneous Q6H Gato Monroy MD ALKPHOS 92 09/20/2023 04:52 AM    AST 20 09/20/2023 04:52 AM    ALT 8 09/20/2023 04:52 AM         Hepatic Function Panel:    Lab Results   Component Value Date/Time    ALKPHOS 92 09/20/2023 04:52 AM    ALT 8 09/20/2023 04:52 AM    AST 20 09/20/2023 04:52 AM    PROT 5.6 09/20/2023 04:52 AM    BILITOT 0.6 09/20/2023 04:52 AM    BILIDIR 0.2 09/10/2021 07:13 PM    IBILI 0.3 09/10/2021 07:13 PM    LABALBU 2.9 09/20/2023 04:52 AM       PT/INR:    Lab Results   Component Value Date/Time    PROTIME 15.3 09/21/2023 02:44 PM    INR 1.4 09/21/2023 02:44 PM       TSH:    Lab Results   Component Value Date/Time    TSH 5.07 09/19/2023 10:58 AM       U/A:    Lab Results   Component Value Date/Time    COLORU Yellow 07/01/2023 11:48 PM    PHUR 8.0 07/01/2023 11:48 PM    WBCUA 5-10 07/01/2023 11:48 PM    RBCUA 0-1 07/01/2023 11:48 PM    MUCUS Present 01/04/2023 09:05 PM    BACTERIA MODERATE 07/01/2023 11:48 PM    CLARITYU SL CLOUDY 07/01/2023 11:48 PM    SPECGRAV 1.010 07/01/2023 11:48 PM    LEUKOCYTESUR TRACE 07/01/2023 11:48 PM    UROBILINOGEN 1.0 07/01/2023 11:48 PM    BILIRUBINUR Negative 07/01/2023 11:48 PM    BLOODU LARGE 07/01/2023 11:48 PM    GLUCOSEU Negative 07/01/2023 11:48 PM       ABG:  No results found for: \"NZJ9GBI\", \"BEART\", \"V6WINRLM\", \"PHART\", \"THGBART\", \"XUC6DSA\", \"PO2ART\", \"UNL7HZJ\"    MICROBIOLOGY:    Blood culture - neg to date     Pleural fluid cx -    Specimen: Body Fluid Updated: 09/21/23 0816    Specimen Description . BODY FLUID    Direct Exam Gram stain performed on unspun fluid. NO Polymorphonuclear leukocytes     RARE EPITHELIAL CELLS Abnormal      NO ORGANISMS SEEN    Culture NO GROWTH   Culture, MRSA, Screening [[id number]]          ASCITIC FLUID: (?)   Satisfactory for evaluation. NEGATIVE FOR MALIGNANCY. Fibrinopurulent exudate and blood, consistent with changes of   suppurative peritonitis. Radiology :    CT chest -  Impression:        1. No evidence of pulmonary embolism.    2.

## 2023-09-22 NOTE — PROGRESS NOTES
Physical Therapy    PT consult to evaluate/treat received and appreciated. Pt chart reviewed and evaluation attempted. Pt is currently on hold d/t respiratory status at time of attempt. Will check back as able. Thank you.         Luis Basurto, PT, DPT   [id number]

## 2023-09-23 ENCOUNTER — APPOINTMENT (OUTPATIENT)
Dept: GENERAL RADIOLOGY | Age: 83
End: 2023-09-23
Payer: MEDICARE

## 2023-09-23 LAB
ABO/RH: NORMAL
ALBUMIN SERPL-MCNC: 1.9 G/DL (ref 3.5–5.2)
ALP SERPL-CCNC: 113 U/L (ref 35–104)
ALT SERPL-CCNC: 8 U/L (ref 0–32)
ANION GAP SERPL CALCULATED.3IONS-SCNC: 11 MMOL/L (ref 7–16)
ANION GAP SERPL CALCULATED.3IONS-SCNC: 8 MMOL/L (ref 7–16)
ANTIBODY SCREEN: NEGATIVE
ARM BAND NUMBER: NORMAL
AST SERPL-CCNC: 22 U/L (ref 0–31)
BASOPHILS # BLD: 0 K/UL (ref 0–0.2)
BASOPHILS NFR BLD: 0 % (ref 0–2)
BILIRUB DIRECT SERPL-MCNC: <0.2 MG/DL (ref 0–0.3)
BILIRUB INDIRECT SERPL-MCNC: ABNORMAL MG/DL (ref 0–1)
BILIRUB SERPL-MCNC: 0.4 MG/DL (ref 0–1.2)
BLOOD BANK BLOOD PRODUCT EXPIRATION DATE: NORMAL
BLOOD BANK DISPENSE STATUS: NORMAL
BLOOD BANK ISBT PRODUCT BLOOD TYPE: 9500
BLOOD BANK PRODUCT CODE: NORMAL
BLOOD BANK SAMPLE EXPIRATION: NORMAL
BLOOD BANK UNIT TYPE AND RH: NORMAL
BPU ID: NORMAL
BUN SERPL-MCNC: 21 MG/DL (ref 6–23)
BUN SERPL-MCNC: 26 MG/DL (ref 6–23)
CA-I BLD-SCNC: 1.12 MMOL/L (ref 1.15–1.33)
CALCIUM SERPL-MCNC: 7.8 MG/DL (ref 8.6–10.2)
CALCIUM SERPL-MCNC: 7.8 MG/DL (ref 8.6–10.2)
CHLORIDE SERPL-SCNC: 107 MMOL/L (ref 98–107)
CHLORIDE SERPL-SCNC: 108 MMOL/L (ref 98–107)
CO2 SERPL-SCNC: 20 MMOL/L (ref 22–29)
CO2 SERPL-SCNC: 24 MMOL/L (ref 22–29)
COMPONENT: NORMAL
CREAT SERPL-MCNC: 0.6 MG/DL (ref 0.5–1)
CREAT SERPL-MCNC: 0.6 MG/DL (ref 0.5–1)
CROSSMATCH RESULT: NORMAL
EOSINOPHIL # BLD: 0 K/UL (ref 0.05–0.5)
EOSINOPHILS RELATIVE PERCENT: 0 % (ref 0–6)
ERYTHROCYTE [DISTWIDTH] IN BLOOD BY AUTOMATED COUNT: 16.9 % (ref 11.5–15)
GFR SERPL CREATININE-BSD FRML MDRD: >60 ML/MIN/1.73M2
GFR SERPL CREATININE-BSD FRML MDRD: >60 ML/MIN/1.73M2
GLUCOSE BLD-MCNC: 105 MG/DL (ref 74–99)
GLUCOSE BLD-MCNC: 126 MG/DL (ref 74–99)
GLUCOSE BLD-MCNC: 132 MG/DL (ref 74–99)
GLUCOSE BLD-MCNC: 155 MG/DL (ref 74–99)
GLUCOSE SERPL-MCNC: 127 MG/DL (ref 74–99)
GLUCOSE SERPL-MCNC: 144 MG/DL (ref 74–99)
HCT VFR BLD AUTO: 25.8 % (ref 34–48)
HGB BLD-MCNC: 8.5 G/DL (ref 11.5–15.5)
LYMPHOCYTES NFR BLD: 0.85 K/UL (ref 1.5–4)
LYMPHOCYTES RELATIVE PERCENT: 4 % (ref 20–42)
MAGNESIUM SERPL-MCNC: 2.1 MG/DL (ref 1.6–2.6)
MAGNESIUM SERPL-MCNC: 2.1 MG/DL (ref 1.6–2.6)
MCH RBC QN AUTO: 28.6 PG (ref 26–35)
MCHC RBC AUTO-ENTMCNC: 32.9 G/DL (ref 32–34.5)
MCV RBC AUTO: 86.9 FL (ref 80–99.9)
METAMYELOCYTES ABSOLUTE COUNT: 0.21 K/UL (ref 0–0.12)
METAMYELOCYTES: 1 % (ref 0–1)
MICROORGANISM SPEC CULT: NORMAL
MICROORGANISM SPEC CULT: NORMAL
MONOCYTES NFR BLD: 0.63 K/UL (ref 0.1–0.95)
MONOCYTES NFR BLD: 3 % (ref 2–12)
NEUTROPHILS NFR BLD: 93 % (ref 43–80)
NEUTS SEG NFR BLD: 22.61 K/UL (ref 1.8–7.3)
NUCLEATED RED BLOOD CELLS: 1 PER 100 WBC
PHOSPHATE SERPL-MCNC: 2.1 MG/DL (ref 2.5–4.5)
PHOSPHATE SERPL-MCNC: 2.5 MG/DL (ref 2.5–4.5)
PLATELET # BLD AUTO: 415 K/UL (ref 130–450)
PMV BLD AUTO: 10 FL (ref 7–12)
POTASSIUM SERPL-SCNC: 4.5 MMOL/L (ref 3.5–5)
POTASSIUM SERPL-SCNC: 4.8 MMOL/L (ref 3.5–5)
PROT SERPL-MCNC: 4.7 G/DL (ref 6.4–8.3)
RBC # BLD AUTO: 2.97 M/UL (ref 3.5–5.5)
RBC # BLD: ABNORMAL 10*6/UL
SERVICE CMNT-IMP: NORMAL
SERVICE CMNT-IMP: NORMAL
SODIUM SERPL-SCNC: 139 MMOL/L (ref 132–146)
SODIUM SERPL-SCNC: 139 MMOL/L (ref 132–146)
SPECIMEN DESCRIPTION: NORMAL
SPECIMEN DESCRIPTION: NORMAL
TRANSFUSION STATUS: NORMAL
UNIT DIVISION: 0
UNIT ISSUE DATE/TIME: NORMAL
VALPROATE SERPL-MCNC: 25 UG/ML (ref 50–100)
WBC OTHER # BLD: 24.3 K/UL (ref 4.5–11.5)

## 2023-09-23 PROCEDURE — 2580000003 HC RX 258: Performed by: FAMILY MEDICINE

## 2023-09-23 PROCEDURE — 2000000000 HC ICU R&B

## 2023-09-23 PROCEDURE — C9113 INJ PANTOPRAZOLE SODIUM, VIA: HCPCS | Performed by: STUDENT IN AN ORGANIZED HEALTH CARE EDUCATION/TRAINING PROGRAM

## 2023-09-23 PROCEDURE — 71045 X-RAY EXAM CHEST 1 VIEW: CPT

## 2023-09-23 PROCEDURE — 2500000003 HC RX 250 WO HCPCS: Performed by: STUDENT IN AN ORGANIZED HEALTH CARE EDUCATION/TRAINING PROGRAM

## 2023-09-23 PROCEDURE — 82962 GLUCOSE BLOOD TEST: CPT

## 2023-09-23 PROCEDURE — 2580000003 HC RX 258: Performed by: STUDENT IN AN ORGANIZED HEALTH CARE EDUCATION/TRAINING PROGRAM

## 2023-09-23 PROCEDURE — P9047 ALBUMIN (HUMAN), 25%, 50ML: HCPCS | Performed by: INTERNAL MEDICINE

## 2023-09-23 PROCEDURE — 80053 COMPREHEN METABOLIC PANEL: CPT

## 2023-09-23 PROCEDURE — 87449 NOS EACH ORGANISM AG IA: CPT

## 2023-09-23 PROCEDURE — 6360000002 HC RX W HCPCS: Performed by: INTERNAL MEDICINE

## 2023-09-23 PROCEDURE — A4216 STERILE WATER/SALINE, 10 ML: HCPCS | Performed by: STUDENT IN AN ORGANIZED HEALTH CARE EDUCATION/TRAINING PROGRAM

## 2023-09-23 PROCEDURE — 2580000003 HC RX 258: Performed by: INTERNAL MEDICINE

## 2023-09-23 PROCEDURE — 85025 COMPLETE CBC W/AUTO DIFF WBC: CPT

## 2023-09-23 PROCEDURE — 6370000000 HC RX 637 (ALT 250 FOR IP): Performed by: STUDENT IN AN ORGANIZED HEALTH CARE EDUCATION/TRAINING PROGRAM

## 2023-09-23 PROCEDURE — 83735 ASSAY OF MAGNESIUM: CPT

## 2023-09-23 PROCEDURE — 6370000000 HC RX 637 (ALT 250 FOR IP): Performed by: CHIROPRACTOR

## 2023-09-23 PROCEDURE — 82330 ASSAY OF CALCIUM: CPT

## 2023-09-23 PROCEDURE — 84100 ASSAY OF PHOSPHORUS: CPT

## 2023-09-23 PROCEDURE — 6370000000 HC RX 637 (ALT 250 FOR IP): Performed by: INTERNAL MEDICINE

## 2023-09-23 PROCEDURE — 99233 SBSQ HOSP IP/OBS HIGH 50: CPT | Performed by: INTERNAL MEDICINE

## 2023-09-23 PROCEDURE — 6360000002 HC RX W HCPCS: Performed by: STUDENT IN AN ORGANIZED HEALTH CARE EDUCATION/TRAINING PROGRAM

## 2023-09-23 PROCEDURE — 6370000000 HC RX 637 (ALT 250 FOR IP): Performed by: FAMILY MEDICINE

## 2023-09-23 PROCEDURE — 87086 URINE CULTURE/COLONY COUNT: CPT

## 2023-09-23 PROCEDURE — 80164 ASSAY DIPROPYLACETIC ACD TOT: CPT

## 2023-09-23 PROCEDURE — 82248 BILIRUBIN DIRECT: CPT

## 2023-09-23 PROCEDURE — 2700000000 HC OXYGEN THERAPY PER DAY

## 2023-09-23 RX ORDER — ALBUMIN (HUMAN) 12.5 G/50ML
50 SOLUTION INTRAVENOUS ONCE
Status: COMPLETED | OUTPATIENT
Start: 2023-09-23 | End: 2023-09-23

## 2023-09-23 RX ORDER — CALCIUM GLUCONATE 10 MG/ML
1000 INJECTION, SOLUTION INTRAVENOUS ONCE
Status: COMPLETED | OUTPATIENT
Start: 2023-09-23 | End: 2023-09-23

## 2023-09-23 RX ORDER — CALCIUM GLUCONATE 94 MG/ML
1000 INJECTION, SOLUTION INTRAVENOUS ONCE
Status: DISCONTINUED | OUTPATIENT
Start: 2023-09-23 | End: 2023-09-23 | Stop reason: SDUPTHER

## 2023-09-23 RX ADMIN — PIPERACILLIN AND TAZOBACTAM 3375 MG: 3; .375 INJECTION, POWDER, LYOPHILIZED, FOR SOLUTION INTRAVENOUS at 20:44

## 2023-09-23 RX ADMIN — VALPROIC ACID 250 MG: 250 SOLUTION ORAL at 20:44

## 2023-09-23 RX ADMIN — PETROLATUM: 420 OINTMENT TOPICAL at 09:04

## 2023-09-23 RX ADMIN — SODIUM CHLORIDE, PRESERVATIVE FREE 40 MG: 5 INJECTION INTRAVENOUS at 20:44

## 2023-09-23 RX ADMIN — VALPROIC ACID 250 MG: 250 SOLUTION ORAL at 09:03

## 2023-09-23 RX ADMIN — COLLAGENASE SANTYL: 250 OINTMENT TOPICAL at 09:04

## 2023-09-23 RX ADMIN — ATORVASTATIN CALCIUM 10 MG: 10 TABLET, FILM COATED ORAL at 09:05

## 2023-09-23 RX ADMIN — PIPERACILLIN AND TAZOBACTAM 3375 MG: 3; .375 INJECTION, POWDER, LYOPHILIZED, FOR SOLUTION INTRAVENOUS at 12:54

## 2023-09-23 RX ADMIN — ASPIRIN 81 MG CHEWABLE TABLET 81 MG: 81 TABLET CHEWABLE at 09:03

## 2023-09-23 RX ADMIN — POLYETHYLENE GLYCOL 3350 17 G: 17 POWDER, FOR SOLUTION ORAL at 09:03

## 2023-09-23 RX ADMIN — WATER 50 MG: 1 INJECTION INTRAMUSCULAR; INTRAVENOUS; SUBCUTANEOUS at 05:53

## 2023-09-23 RX ADMIN — GUAIFENESIN 400 MG: 200 SOLUTION ORAL at 11:37

## 2023-09-23 RX ADMIN — THIAMINE HYDROCHLORIDE 100 MG: 100 INJECTION, SOLUTION INTRAMUSCULAR; INTRAVENOUS at 09:03

## 2023-09-23 RX ADMIN — METOPROLOL TARTRATE 50 MG: 50 TABLET ORAL at 09:03

## 2023-09-23 RX ADMIN — ALBUMIN (HUMAN) 50 G: 0.25 INJECTION, SOLUTION INTRAVENOUS at 11:29

## 2023-09-23 RX ADMIN — MIRTAZAPINE 30 MG: 15 TABLET, FILM COATED ORAL at 20:52

## 2023-09-23 RX ADMIN — APIXABAN 5 MG: 5 TABLET, FILM COATED ORAL at 09:04

## 2023-09-23 RX ADMIN — APIXABAN 5 MG: 5 TABLET, FILM COATED ORAL at 20:44

## 2023-09-23 RX ADMIN — MEMANTINE 10 MG: 10 TABLET ORAL at 20:44

## 2023-09-23 RX ADMIN — PETROLATUM: 420 OINTMENT TOPICAL at 20:45

## 2023-09-23 RX ADMIN — CALCIUM GLUCONATE 1000 MG: 10 INJECTION, SOLUTION INTRAVENOUS at 11:39

## 2023-09-23 RX ADMIN — WATER 50 MG: 1 INJECTION INTRAMUSCULAR; INTRAVENOUS; SUBCUTANEOUS at 18:04

## 2023-09-23 RX ADMIN — MEMANTINE 10 MG: 10 TABLET ORAL at 09:04

## 2023-09-23 RX ADMIN — PIPERACILLIN AND TAZOBACTAM 3375 MG: 3; .375 INJECTION, POWDER, LYOPHILIZED, FOR SOLUTION INTRAVENOUS at 04:01

## 2023-09-23 RX ADMIN — GUAIFENESIN 400 MG: 200 SOLUTION ORAL at 23:48

## 2023-09-23 RX ADMIN — VALPROIC ACID 250 MG: 250 SOLUTION ORAL at 15:42

## 2023-09-23 RX ADMIN — SODIUM CHLORIDE, PRESERVATIVE FREE 40 MG: 5 INJECTION INTRAVENOUS at 09:03

## 2023-09-23 RX ADMIN — SODIUM CHLORIDE, PRESERVATIVE FREE 10 ML: 5 INJECTION INTRAVENOUS at 09:05

## 2023-09-23 ASSESSMENT — PAIN SCALES - PAIN ASSESSMENT IN ADVANCED DEMENTIA (PAINAD)
BODYLANGUAGE: 0
NEGVOCALIZATION: 0
BREATHING: 0
CONSOLABILITY: 0
BREATHING: 0
BREATHING: 0
FACIALEXPRESSION: 0
CONSOLABILITY: 0
NEGVOCALIZATION: 0
NEGVOCALIZATION: 0
FACIALEXPRESSION: 0
BREATHING: 0
TOTALSCORE: 0
TOTALSCORE: 0
BREATHING: 0
BODYLANGUAGE: 0
TOTALSCORE: 0
TOTALSCORE: 0
BREATHING: 0
NEGVOCALIZATION: 0
BREATHING: 0
NEGVOCALIZATION: 0
FACIALEXPRESSION: 0
BODYLANGUAGE: 0
NEGVOCALIZATION: 0
BODYLANGUAGE: 0
FACIALEXPRESSION: 0
TOTALSCORE: 0
CONSOLABILITY: 0
TOTALSCORE: 0
CONSOLABILITY: 0
CONSOLABILITY: 0
NEGVOCALIZATION: 0
BODYLANGUAGE: 0
FACIALEXPRESSION: 0
CONSOLABILITY: 0
FACIALEXPRESSION: 0
NEGVOCALIZATION: 0
FACIALEXPRESSION: 0
BODYLANGUAGE: 0
TOTALSCORE: 0
CONSOLABILITY: 0
FACIALEXPRESSION: 0
TOTALSCORE: 0
BODYLANGUAGE: 0
FACIALEXPRESSION: 0
BREATHING: 0
TOTALSCORE: 0
NEGVOCALIZATION: 0
NEGVOCALIZATION: 0
BREATHING: 0
FACIALEXPRESSION: 0
CONSOLABILITY: 0
CONSOLABILITY: 0
BREATHING: 0
BODYLANGUAGE: 0
TOTALSCORE: 0
FACIALEXPRESSION: 0
BODYLANGUAGE: 0
NEGVOCALIZATION: 0
BREATHING: 0
CONSOLABILITY: 0
FACIALEXPRESSION: 0
BODYLANGUAGE: 0
NEGVOCALIZATION: 0
TOTALSCORE: 0
BREATHING: 0
CONSOLABILITY: 0
CONSOLABILITY: 0
TOTALSCORE: 0

## 2023-09-23 ASSESSMENT — PAIN SCALES - GENERAL
PAINLEVEL_OUTOF10: 0
PAINLEVEL_OUTOF10: 0

## 2023-09-23 NOTE — PROGRESS NOTES
Department of Internal Medicine  Infectious Diseases   Progress  Note    Face to face encounter   C/C :  Empyema (?), leukocytosis     9/23/2023  Remains in ICU  Patient is lethargic   Has been afebrile.   Pt is awake   No distress   Afebrile       Current Facility-Administered Medications   Medication Dose Route Frequency Provider Last Rate Last Admin    metoprolol tartrate (LOPRESSOR) tablet 50 mg  50 mg Oral BID Tabatha Acevedo MD   50 mg at 09/23/23 0903    hydrocortisone sodium succinate PF (SOLU-CORTEF) 50 mg in sterile water 2 mL injection  50 mg IntraVENous Q12H Hillary Orona MD   50 mg at 09/23/23 0553    polyethylene glycol (GLYCOLAX) packet 17 g  17 g Oral Daily Hillary Orona MD   17 g at 09/23/23 0903    guaiFENesin (ROBITUSSIN) 100 MG/5ML liquid 400 mg  400 mg Oral Q12H Hillary Orona MD   400 mg at 09/22/23 2301    thiamine (B-1) injection 100 mg  100 mg IntraVENous Daily Hillary Orona MD   100 mg at 09/23/23 0688    white petrolatum ointment   Topical BID Benedict Ku MD   Given at 09/23/23 3922    And    white petrolatum ointment   Topical TID PRN Benedict Ku MD        collagenase ointment   Topical Daily Benedict Ku MD   Given at 09/23/23 4056    valproic acid (DEPAKENE) 250 MG/5ML oral solution 250 mg  250 mg Oral TID Charo Rodrigues MD   250 mg at 09/23/23 0903    pantoprazole (PROTONIX) 40 mg in sodium chloride (PF) 0.9 % 10 mL injection  40 mg IntraVENous Q12H Hillary Orona MD   40 mg at 09/23/23 0903    glucose chewable tablet 16 g  4 tablet Oral PRN Hillary Orona MD        dextrose bolus 10% 125 mL  125 mL IntraVENous PRN Hillary Orona MD        Or    dextrose bolus 10% 250 mL  250 mL IntraVENous PRN Hillary Orona MD        glucagon injection 1 mg  1 mg SubCUTAneous PRN Hillary Orona MD        dextrose 10 % infusion   IntraVENous Continuous PRN Hillary Orona MD        insulin lispro (HUMALOG) injection vial 0-4 Units  0-4 Units SubCUTAneous Q6H Hillary Orona MD Radiology :    CT chest -  Impression:        1. No evidence of pulmonary embolism. 2. Extremely large right pleural effusion with partially loculated anterior   hydropneumothorax component, possibly related to superimposed empyema at the   anterior right base and extending to the anterior inferior right pulmonary   hilum. 3. Complete collapse of the right lung with mild leftward shift of the cardio   mediastinum secondary to #2 above. IMPRESSION:     Right pleural effusion-( exudative )  r/o empyema s/p pig tail drainage ( 9/20)   Leukocytosis - 20 K     RECOMMENDATIONS:       1. IV zosyn  3.375 grams q 8 hrs  2. Await cx- no growth to date   3. Monitor labs  4.   On solu-cortef       9/23/2023  DOMINIQUE Awad - CNS

## 2023-09-24 ENCOUNTER — APPOINTMENT (OUTPATIENT)
Dept: GENERAL RADIOLOGY | Age: 83
End: 2023-09-24
Payer: MEDICARE

## 2023-09-24 LAB
ALBUMIN SERPL-MCNC: 2.4 G/DL (ref 3.5–5.2)
ALP SERPL-CCNC: 125 U/L (ref 35–104)
ALT SERPL-CCNC: 12 U/L (ref 0–32)
ANION GAP SERPL CALCULATED.3IONS-SCNC: 11 MMOL/L (ref 7–16)
ARM BAND NUMBER: NORMAL
AST SERPL-CCNC: 33 U/L (ref 0–31)
BASOPHILS # BLD: 0 K/UL (ref 0–0.2)
BASOPHILS NFR BLD: 0 % (ref 0–2)
BILIRUB DIRECT SERPL-MCNC: <0.2 MG/DL (ref 0–0.3)
BILIRUB INDIRECT SERPL-MCNC: ABNORMAL MG/DL (ref 0–1)
BILIRUB SERPL-MCNC: 0.4 MG/DL (ref 0–1.2)
BLOOD BANK BLOOD PRODUCT EXPIRATION DATE: NORMAL
BLOOD BANK DISPENSE STATUS: NORMAL
BLOOD BANK DISPENSE STATUS: NORMAL
BLOOD BANK ISBT PRODUCT BLOOD TYPE: 5100
BLOOD BANK PRODUCT CODE: NORMAL
BLOOD BANK UNIT TYPE AND RH: NORMAL
BPU ID: NORMAL
BPU ID: NORMAL
BUN SERPL-MCNC: 31 MG/DL (ref 6–23)
CALCIUM SERPL-MCNC: 8 MG/DL (ref 8.6–10.2)
CHLORIDE SERPL-SCNC: 107 MMOL/L (ref 98–107)
CO2 SERPL-SCNC: 22 MMOL/L (ref 22–29)
COMPONENT: NORMAL
COMPONENT: NORMAL
CREAT SERPL-MCNC: 0.6 MG/DL (ref 0.5–1)
EOSINOPHIL # BLD: 0 K/UL (ref 0.05–0.5)
EOSINOPHILS RELATIVE PERCENT: 0 % (ref 0–6)
ERYTHROCYTE [DISTWIDTH] IN BLOOD BY AUTOMATED COUNT: 17.3 % (ref 11.5–15)
GFR SERPL CREATININE-BSD FRML MDRD: >60 ML/MIN/1.73M2
GLUCOSE BLD-MCNC: 149 MG/DL (ref 74–99)
GLUCOSE BLD-MCNC: 174 MG/DL (ref 74–99)
GLUCOSE BLD-MCNC: 185 MG/DL (ref 74–99)
GLUCOSE SERPL-MCNC: 182 MG/DL (ref 74–99)
HCT VFR BLD AUTO: 26.7 % (ref 34–48)
HGB BLD-MCNC: 8.4 G/DL (ref 11.5–15.5)
LYMPHOCYTES NFR BLD: 3.23 K/UL (ref 1.5–4)
LYMPHOCYTES RELATIVE PERCENT: 15 % (ref 20–42)
MAGNESIUM SERPL-MCNC: 2.2 MG/DL (ref 1.6–2.6)
MCH RBC QN AUTO: 27.6 PG (ref 26–35)
MCHC RBC AUTO-ENTMCNC: 31.5 G/DL (ref 32–34.5)
MCV RBC AUTO: 87.8 FL (ref 80–99.9)
MICROORGANISM SPEC CULT: NO GROWTH
MONOCYTES NFR BLD: 0.86 K/UL (ref 0.1–0.95)
MONOCYTES NFR BLD: 4 % (ref 2–12)
NEUTROPHILS NFR BLD: 81 % (ref 43–80)
NEUTS SEG NFR BLD: 17.42 K/UL (ref 1.8–7.3)
NUCLEATED RED BLOOD CELLS: 1 PER 100 WBC
PHOSPHATE SERPL-MCNC: 2 MG/DL (ref 2.5–4.5)
PLATELET # BLD AUTO: 442 K/UL (ref 130–450)
PMV BLD AUTO: 10 FL (ref 7–12)
POTASSIUM SERPL-SCNC: 4 MMOL/L (ref 3.5–5)
PROT SERPL-MCNC: 5 G/DL (ref 6.4–8.3)
RBC # BLD AUTO: 3.04 M/UL (ref 3.5–5.5)
RBC # BLD: ABNORMAL 10*6/UL
SODIUM SERPL-SCNC: 140 MMOL/L (ref 132–146)
SPECIMEN DESCRIPTION: NORMAL
TRANSFUSION STATUS: NORMAL
TRANSFUSION STATUS: NORMAL
UNIT DIVISION: 0
UNIT DIVISION: 0
UNIT ISSUE DATE/TIME: NORMAL
VIT B1 PYROPHOSHATE BLD-SCNC: 314 NMOL/L (ref 70–180)
WBC OTHER # BLD: 21.5 K/UL (ref 4.5–11.5)

## 2023-09-24 PROCEDURE — 6360000002 HC RX W HCPCS: Performed by: STUDENT IN AN ORGANIZED HEALTH CARE EDUCATION/TRAINING PROGRAM

## 2023-09-24 PROCEDURE — 2580000003 HC RX 258

## 2023-09-24 PROCEDURE — 87205 SMEAR GRAM STAIN: CPT

## 2023-09-24 PROCEDURE — C9113 INJ PANTOPRAZOLE SODIUM, VIA: HCPCS | Performed by: STUDENT IN AN ORGANIZED HEALTH CARE EDUCATION/TRAINING PROGRAM

## 2023-09-24 PROCEDURE — 99233 SBSQ HOSP IP/OBS HIGH 50: CPT | Performed by: INTERNAL MEDICINE

## 2023-09-24 PROCEDURE — 83735 ASSAY OF MAGNESIUM: CPT

## 2023-09-24 PROCEDURE — 80053 COMPREHEN METABOLIC PANEL: CPT

## 2023-09-24 PROCEDURE — 2580000003 HC RX 258: Performed by: FAMILY MEDICINE

## 2023-09-24 PROCEDURE — 6370000000 HC RX 637 (ALT 250 FOR IP): Performed by: CHIROPRACTOR

## 2023-09-24 PROCEDURE — 2700000000 HC OXYGEN THERAPY PER DAY

## 2023-09-24 PROCEDURE — 84100 ASSAY OF PHOSPHORUS: CPT

## 2023-09-24 PROCEDURE — 2580000003 HC RX 258: Performed by: STUDENT IN AN ORGANIZED HEALTH CARE EDUCATION/TRAINING PROGRAM

## 2023-09-24 PROCEDURE — 82248 BILIRUBIN DIRECT: CPT

## 2023-09-24 PROCEDURE — 87040 BLOOD CULTURE FOR BACTERIA: CPT

## 2023-09-24 PROCEDURE — 85025 COMPLETE CBC W/AUTO DIFF WBC: CPT

## 2023-09-24 PROCEDURE — 2500000003 HC RX 250 WO HCPCS

## 2023-09-24 PROCEDURE — 6370000000 HC RX 637 (ALT 250 FOR IP): Performed by: INTERNAL MEDICINE

## 2023-09-24 PROCEDURE — 6370000000 HC RX 637 (ALT 250 FOR IP): Performed by: FAMILY MEDICINE

## 2023-09-24 PROCEDURE — 82962 GLUCOSE BLOOD TEST: CPT

## 2023-09-24 PROCEDURE — 2000000000 HC ICU R&B

## 2023-09-24 PROCEDURE — 6370000000 HC RX 637 (ALT 250 FOR IP): Performed by: STUDENT IN AN ORGANIZED HEALTH CARE EDUCATION/TRAINING PROGRAM

## 2023-09-24 PROCEDURE — 6360000002 HC RX W HCPCS: Performed by: INTERNAL MEDICINE

## 2023-09-24 PROCEDURE — 71045 X-RAY EXAM CHEST 1 VIEW: CPT

## 2023-09-24 PROCEDURE — P9047 ALBUMIN (HUMAN), 25%, 50ML: HCPCS | Performed by: STUDENT IN AN ORGANIZED HEALTH CARE EDUCATION/TRAINING PROGRAM

## 2023-09-24 PROCEDURE — 87070 CULTURE OTHR SPECIMN AEROBIC: CPT

## 2023-09-24 PROCEDURE — 2580000003 HC RX 258: Performed by: INTERNAL MEDICINE

## 2023-09-24 PROCEDURE — 2500000003 HC RX 250 WO HCPCS: Performed by: STUDENT IN AN ORGANIZED HEALTH CARE EDUCATION/TRAINING PROGRAM

## 2023-09-24 PROCEDURE — A4216 STERILE WATER/SALINE, 10 ML: HCPCS | Performed by: STUDENT IN AN ORGANIZED HEALTH CARE EDUCATION/TRAINING PROGRAM

## 2023-09-24 PROCEDURE — 87077 CULTURE AEROBIC IDENTIFY: CPT

## 2023-09-24 RX ORDER — ALBUMIN (HUMAN) 12.5 G/50ML
25 SOLUTION INTRAVENOUS EVERY 6 HOURS
Status: COMPLETED | OUTPATIENT
Start: 2023-09-24 | End: 2023-09-25

## 2023-09-24 RX ORDER — ACYCLOVIR 200 MG/1
400 CAPSULE ORAL 3 TIMES DAILY
Status: DISCONTINUED | OUTPATIENT
Start: 2023-09-24 | End: 2023-09-24

## 2023-09-24 RX ADMIN — VALPROIC ACID 250 MG: 250 SOLUTION ORAL at 16:02

## 2023-09-24 RX ADMIN — GUAIFENESIN 400 MG: 200 SOLUTION ORAL at 12:14

## 2023-09-24 RX ADMIN — METOPROLOL TARTRATE 50 MG: 50 TABLET ORAL at 09:02

## 2023-09-24 RX ADMIN — PIPERACILLIN AND TAZOBACTAM 3375 MG: 3; .375 INJECTION, POWDER, LYOPHILIZED, FOR SOLUTION INTRAVENOUS at 12:59

## 2023-09-24 RX ADMIN — POLYETHYLENE GLYCOL 3350 17 G: 17 POWDER, FOR SOLUTION ORAL at 09:02

## 2023-09-24 RX ADMIN — ALBUMIN (HUMAN) 25 G: 0.25 INJECTION, SOLUTION INTRAVENOUS at 20:38

## 2023-09-24 RX ADMIN — THIAMINE HYDROCHLORIDE 100 MG: 100 INJECTION, SOLUTION INTRAMUSCULAR; INTRAVENOUS at 12:14

## 2023-09-24 RX ADMIN — MEMANTINE 10 MG: 10 TABLET ORAL at 20:43

## 2023-09-24 RX ADMIN — PETROLATUM: 420 OINTMENT TOPICAL at 09:03

## 2023-09-24 RX ADMIN — MEMANTINE 10 MG: 10 TABLET ORAL at 09:02

## 2023-09-24 RX ADMIN — COLLAGENASE SANTYL: 250 OINTMENT TOPICAL at 15:53

## 2023-09-24 RX ADMIN — POTASSIUM PHOSPHATE, MONOBASIC AND POTASSIUM PHOSPHATE, DIBASIC 15 MMOL: 224; 236 INJECTION, SOLUTION, CONCENTRATE INTRAVENOUS at 08:56

## 2023-09-24 RX ADMIN — PIPERACILLIN AND TAZOBACTAM 3375 MG: 3; .375 INJECTION, POWDER, LYOPHILIZED, FOR SOLUTION INTRAVENOUS at 04:46

## 2023-09-24 RX ADMIN — ATORVASTATIN CALCIUM 10 MG: 10 TABLET, FILM COATED ORAL at 09:02

## 2023-09-24 RX ADMIN — ASPIRIN 81 MG CHEWABLE TABLET 81 MG: 81 TABLET CHEWABLE at 09:02

## 2023-09-24 RX ADMIN — VALPROIC ACID 250 MG: 250 SOLUTION ORAL at 09:02

## 2023-09-24 RX ADMIN — SODIUM CHLORIDE, PRESERVATIVE FREE 10 ML: 5 INJECTION INTRAVENOUS at 20:43

## 2023-09-24 RX ADMIN — MIRTAZAPINE 30 MG: 15 TABLET, FILM COATED ORAL at 20:43

## 2023-09-24 RX ADMIN — WATER 50 MG: 1 INJECTION INTRAMUSCULAR; INTRAVENOUS; SUBCUTANEOUS at 06:38

## 2023-09-24 RX ADMIN — SODIUM CHLORIDE, PRESERVATIVE FREE 10 ML: 5 INJECTION INTRAVENOUS at 09:03

## 2023-09-24 RX ADMIN — WATER 50 MG: 1 INJECTION INTRAMUSCULAR; INTRAVENOUS; SUBCUTANEOUS at 21:41

## 2023-09-24 RX ADMIN — SODIUM CHLORIDE, PRESERVATIVE FREE 40 MG: 5 INJECTION INTRAVENOUS at 09:02

## 2023-09-24 RX ADMIN — SODIUM CHLORIDE, PRESERVATIVE FREE 40 MG: 5 INJECTION INTRAVENOUS at 20:43

## 2023-09-24 RX ADMIN — GUAIFENESIN 400 MG: 200 SOLUTION ORAL at 22:49

## 2023-09-24 RX ADMIN — METOPROLOL TARTRATE 50 MG: 50 TABLET ORAL at 22:45

## 2023-09-24 RX ADMIN — VALPROIC ACID 250 MG: 250 SOLUTION ORAL at 20:42

## 2023-09-24 RX ADMIN — PETROLATUM: 420 OINTMENT TOPICAL at 20:43

## 2023-09-24 RX ADMIN — PIPERACILLIN AND TAZOBACTAM 3375 MG: 3; .375 INJECTION, POWDER, LYOPHILIZED, FOR SOLUTION INTRAVENOUS at 20:42

## 2023-09-24 ASSESSMENT — PAIN SCALES - GENERAL: PAINLEVEL_OUTOF10: 0

## 2023-09-24 ASSESSMENT — PAIN SCALES - PAIN ASSESSMENT IN ADVANCED DEMENTIA (PAINAD)
CONSOLABILITY: 0
BREATHING: 0
NEGVOCALIZATION: 0
BODYLANGUAGE: 0
NEGVOCALIZATION: 0
BREATHING: 0
CONSOLABILITY: 0
NEGVOCALIZATION: 0
BODYLANGUAGE: 0
BREATHING: 0
CONSOLABILITY: 0
BODYLANGUAGE: 0
TOTALSCORE: 0
FACIALEXPRESSION: 0
TOTALSCORE: 0
BREATHING: 0
BODYLANGUAGE: 0
FACIALEXPRESSION: 0
BREATHING: 0
CONSOLABILITY: 0
NEGVOCALIZATION: 0
FACIALEXPRESSION: 0
TOTALSCORE: 0
TOTALSCORE: 0
BREATHING: 0
NEGVOCALIZATION: 0
CONSOLABILITY: 0
CONSOLABILITY: 0
TOTALSCORE: 0
FACIALEXPRESSION: 0
FACIALEXPRESSION: 0
BODYLANGUAGE: 0
FACIALEXPRESSION: 0
TOTALSCORE: 0
NEGVOCALIZATION: 0
BODYLANGUAGE: 0

## 2023-09-24 NOTE — PROGRESS NOTES
[address] [facility] PROGRESS NOTE    Patient: Moses Dorado  MRN: [id number]  : 1940    Encounter Date: 2023  Encounter Time: 3:34 PM     Date of Admission: .2023 11:02 AM    Consulting Physician:  Primary Care Physician:     Jacquelyn Jackson III, DO     [phone]    Reason for Consultation: Pleural Effusion     Problem List:  Patient Active Problem List   Diagnosis    Essential hypertension    Acute colitis    Nausea and vomiting    Depression    Dementia ([address])    Cholelithiases    HTN (hypertension)    Leukocytosis    Shingles    Biliary colic    Sepsis ([address])    New onset atrial fibrillation ([address])    Pleural effusion on right    Empyema of lung ([address])    Pleural effusion     SUBJECTIVE: Patient seen and examined. Overnight the patient had no shortness of breath, cough, increased work of breathing. HOME MEDICATIONS:  Prior to Admission medications    Medication Sig Start Date End Date Taking?  Authorizing Provider   acetaminophen (TYLENOL) 325 MG tablet Take 2 tablets by mouth every 6 hours as needed for Pain or Fever   Yes Historical Provider, MD   ferrous sulfate (IRON 325) 325 (65 Fe) MG tablet Take 1 tablet by mouth daily (with breakfast)   Yes Historical Provider, MD   folic acid (FOLVITE) 1 MG tablet Take 1 tablet by mouth daily   Yes Historical Provider, MD   memantine (NAMENDA) 10 MG tablet Take 1 tablet by mouth 2 times daily   Yes Historical Provider, MD   vitamin C (ASCORBIC ACID) 500 MG tablet Take 2 tablets by mouth daily   Yes Historical Provider, MD   divalproex (DEPAKOTE) 250 MG DR tablet Take 1 tablet by mouth 3 times daily 23   Mitesh Hall DO   vitamin D (CHOLECALCIFEROL) 50 MCG (2000) TABS tablet Take 1 tablet by mouth daily 23   Mitesh Hall DO   apixaban (ELIQUIS) 5 MG TABS tablet Take 1 tablet by mouth 2 times daily 23 Corwin Adams DO   aspirin 81 MG chewable tablet Take 1 tablet by %  24HR PULSE OXIMETRY RANGE: SpO2  Av.3 %  Min: 94 %  Max: 100 %  CVP:      ________________________________________________________________________    VENTILATOR SETTINGS (if applicable): Additional Respiratory Assessments  Pulse: 74  Respirations: 18  SpO2: 99 %  ETCO2:  Peak Inspiratory Pressure:  End-Inspiratory Plateau Pressure:    ABG:    Recent Labs     23  0805   PH 7.501*   PO2 55.0*   PCO2 27.0*   HCO3 20.6*   BE -1.6   O2SAT 89.6*   METHB 0.1   O2HB 88.6*   COHB 1.0   O2CON 13.4   HHB 10.3*   THB 10.7*           ________________________________________________________________________    IV ACCESS:    NUTRITION: Diet NPO  ADULT TUBE FEEDING; Nasoenteric; Peptide Based High Protein; Continuous; 10; Yes; 10; Q 4 hours; 55; 100; Q 4 hours; Protein; BID    INTAKE/OUTPUTS:  I/O last 3 completed shifts: In: 3929 [I.V.:619; NG/GT:3310]  Out: 6424 [Urine:1050;  Chest Tube:1175]    Intake/Output Summary (Last 24 hours) at 2023 1534  Last data filed at 2023 0600  Gross per 24 hour   Intake 1793 ml   Output 1605 ml   Net 188 ml       General Appearance: alert and oriented to person, place and time, well-developed and   well-nourished, in no acute distress   Eyes: pupils equal, round, and reactive to light, extraocular eye movements intact, conjunctivae normal and sclera anicteric   Neck: neck supple and non tender without mass, no thyromegaly, no thyroid nodules and no cervical adenopathy   Pulmonary/Chest: decreased breath sounds at bases, no accessory muscles of inspiration noted  Cardiovascular: normal rate, regular rhythm, normal S1 and S2, no murmurs, rubs, clicks or gallops, distal pulses intact, no carotid bruits, no murmurs, no gallops, no carotid bruits and no JVD   Abdomen: soft, non-tender, non-distended, normal bowel sounds, no masses or organomegaly   Extremities: no cyanosis, no clubbing, no edema  Musculoskeletal: normal range of motion, no joint swelling, deformity or 09/06/2021     No results found for: \"EAG\"  MARILOU: No results found for: \"MARILOU\"  ESR:   Lab Results   Component Value Date    SEDRATE 25 (H) 07/04/2023     CRP:   Lab Results   Component Value Date    CRP 33.8 (H) 07/04/2023     D Dimer:   Lab Results   Component Value Date    DDIMER 735 (H) 09/18/2023     Folate and B12: No results found for: \"[id number]\", No results found for: \"FOLATE\"    Lactic Acid:   Lab Results   Component Value Date    LACTA 4.0 ([address] [place] [address],[address]) 09/18/2023     Ammonia:   Cortisol:  Thyroid Studies:  Lab Results   Component Value Date    TSH 5.07 (H) 09/19/2023     XR CHEST PORTABLE   Final Result   1. Improved congestive changes   2. Decreased bilateral pleural effusions and atelectasis   3. The position of the nasogastric tube appears within the normal range but   the stomach remains distended         XR CHEST PORTABLE   Final Result   Increasing right pleural effusion. .         XR CHEST PORTABLE   Final Result   Stable appearance of the chest compared to 09/21/2023. XR CHEST PORTABLE   Final Result   1. Unchanged support apparatus. 2. Unchanged low lung volumes and small pleural effusions. 3. Possible interstitial edema. XR CHEST PORTABLE   Final Result   1. Feeding tube tip in the Shannan pyloric region. Right basilar chest tube. 2. Small bilateral pleural effusions with atelectasis. Low lung volumes. 3. No obvious pneumothorax. XR CHEST ABDOMEN NG PLACEMENT   Final Result   Feeding tube terminates in Peripyloric location likely within the 1st portion   of the duodenum. XR CHEST PORTABLE   Final Result   1. Recent placement of right-sided pigtail chest tube, with significant   decrease in the right pleural effusion. 2. Nonspecific pulmonary opacities in the right mid and lower lung, which may   represent atelectasis or pneumonia. 3. No pneumothorax is evident by plain radiography. CTA PULMONARY W CONTRAST   Final Result   1.  No evidence of pulmonary

## 2023-09-24 NOTE — PROGRESS NOTES
Department of Internal Medicine  Infectious Diseases   Progress  Note    Face to face encounter   C/C :  Empyema (?), leukocytosis     9/24/2023  Remains in ICU  Patient is in bed- lethargic  On 12 liters high flow  Afebrile. 9/23/2023  Remains in ICU  Patient is lethargic   Has been afebrile.   Pt is awake   No distress   Afebrile       Current Facility-Administered Medications   Medication Dose Route Frequency Provider Last Rate Last Admin    potassium phosphate 15 mmol in sodium chloride 0.9 % 250 mL IVPB  15 mmol IntraVENous Once Yeimy Walton MD 62.5 mL/hr at 09/24/23 0856 15 mmol at 09/24/23 0856    metoprolol tartrate (LOPRESSOR) tablet 50 mg  50 mg Oral BID Tabatha Acevedo MD   50 mg at 09/24/23 0902    hydrocortisone sodium succinate PF (SOLU-CORTEF) 50 mg in sterile water 2 mL injection  50 mg IntraVENous Q12H Simon Spicer MD   50 mg at 09/24/23 7702    polyethylene glycol (GLYCOLAX) packet 17 g  17 g Oral Daily Simon Spicer MD   17 g at 09/24/23 0902    guaiFENesin (ROBITUSSIN) 100 MG/5ML liquid 400 mg  400 mg Oral Q12H Simon Spicer MD   400 mg at 09/23/23 2348    thiamine (B-1) injection 100 mg  100 mg IntraVENous Daily Simon Spicer MD   100 mg at 09/23/23 1805    white petrolatum ointment   Topical BID Anthony Alvarez MD   Given at 09/24/23 6839    And    white petrolatum ointment   Topical TID PRN Anthony Alvarez MD        collagenase ointment   Topical Daily Anthony Alvarez MD   Given at 09/24/23 0902    valproic acid (DEPAKENE) 250 MG/5ML oral solution 250 mg  250 mg Oral TID Roxy Bowman MD   250 mg at 09/24/23 0902    pantoprazole (PROTONIX) 40 mg in sodium chloride (PF) 0.9 % 10 mL injection  40 mg IntraVENous Q12H Simon Spicer MD   40 mg at 09/24/23 0902    glucose chewable tablet 16 g  4 tablet Oral PRN Simon Spicer MD        dextrose bolus 10% 125 mL  125 mL IntraVENous PRN Simon Spicer MD        Or    dextrose bolus 10% 250 mL  250 mL IntraVENous [[id number]]          ASCITIC FLUID: (?)   Satisfactory for evaluation. NEGATIVE FOR MALIGNANCY. Fibrinopurulent exudate and blood, consistent with changes of   suppurative peritonitis. Radiology :    CT chest -  Impression:        1. No evidence of pulmonary embolism. 2. Extremely large right pleural effusion with partially loculated anterior   hydropneumothorax component, possibly related to superimposed empyema at the   anterior right base and extending to the anterior inferior right pulmonary   hilum. 3. Complete collapse of the right lung with mild leftward shift of the cardio   mediastinum secondary to #2 above. IMPRESSION:     Right pleural effusion-( exudative )  r/o empyema s/p pig tail drainage ( 9/20)   Leukocytosis - 20 K     RECOMMENDATIONS:       1. IV zosyn  3.375 grams q 8 hrs  2. Await cx- no growth to date   3. Monitor labs- WBC- 21.5   4.   On solu-cortef     9/24/2023  DOMINIQUE Parrish - CNS

## 2023-09-24 NOTE — PROGRESS NOTES
Spoke to Dr. Myke Rascon regarding H&H lab needing collection and to clarify free water flush order reading 100mL every hour.  Verbal orders obtained to cancel H&H that needs collected and check in the morning and to change free water order to 100mL every 4 hours

## 2023-09-25 ENCOUNTER — APPOINTMENT (OUTPATIENT)
Dept: GENERAL RADIOLOGY | Age: 83
End: 2023-09-25
Payer: MEDICARE

## 2023-09-25 LAB
1,3 BETA GLUCAN SER-MCNC: <31 PG/ML
1,3 BETA-D-GLUCAN INTERP: NEGATIVE
ANION GAP SERPL CALCULATED.3IONS-SCNC: 10 MMOL/L (ref 7–16)
BASOPHILS # BLD: 0 K/UL (ref 0–0.2)
BASOPHILS NFR BLD: 0 % (ref 0–2)
BUN SERPL-MCNC: 29 MG/DL (ref 6–23)
CALCIUM SERPL-MCNC: 8.1 MG/DL (ref 8.6–10.2)
CHLORIDE SERPL-SCNC: 110 MMOL/L (ref 98–107)
CO2 SERPL-SCNC: 24 MMOL/L (ref 22–29)
CREAT SERPL-MCNC: 0.4 MG/DL (ref 0.5–1)
EOSINOPHIL # BLD: 0 K/UL (ref 0.05–0.5)
EOSINOPHILS RELATIVE PERCENT: 0 % (ref 0–6)
ERYTHROCYTE [DISTWIDTH] IN BLOOD BY AUTOMATED COUNT: 17.7 % (ref 11.5–15)
GFR SERPL CREATININE-BSD FRML MDRD: >60 ML/MIN/1.73M2
GLUCOSE BLD-MCNC: 120 MG/DL (ref 74–99)
GLUCOSE BLD-MCNC: 139 MG/DL (ref 74–99)
GLUCOSE BLD-MCNC: 171 MG/DL (ref 74–99)
GLUCOSE BLD-MCNC: 171 MG/DL (ref 74–99)
GLUCOSE SERPL-MCNC: 173 MG/DL (ref 74–99)
HCT VFR BLD AUTO: 22.4 % (ref 34–48)
HCT VFR BLD AUTO: 24.6 % (ref 34–48)
HGB BLD-MCNC: 6.8 G/DL (ref 11.5–15.5)
HGB BLD-MCNC: 7.8 G/DL (ref 11.5–15.5)
LYMPHOCYTES NFR BLD: 0.96 K/UL (ref 1.5–4)
LYMPHOCYTES RELATIVE PERCENT: 8 % (ref 20–42)
MCH RBC QN AUTO: 27.6 PG (ref 26–35)
MCHC RBC AUTO-ENTMCNC: 30.4 G/DL (ref 32–34.5)
MCV RBC AUTO: 91.1 FL (ref 80–99.9)
MONOCYTES NFR BLD: 0.43 K/UL (ref 0.1–0.95)
MONOCYTES NFR BLD: 4 % (ref 2–12)
NEUTROPHILS NFR BLD: 89 % (ref 43–80)
NEUTS SEG NFR BLD: 10.91 K/UL (ref 1.8–7.3)
PLATELET # BLD AUTO: 349 K/UL (ref 130–450)
PMV BLD AUTO: 10.3 FL (ref 7–12)
POTASSIUM SERPL-SCNC: 4.1 MMOL/L (ref 3.5–5)
RBC # BLD AUTO: 2.46 M/UL (ref 3.5–5.5)
RBC # BLD: ABNORMAL 10*6/UL
SODIUM SERPL-SCNC: 144 MMOL/L (ref 132–146)
WBC OTHER # BLD: 12.3 K/UL (ref 4.5–11.5)

## 2023-09-25 PROCEDURE — 36430 TRANSFUSION BLD/BLD COMPNT: CPT

## 2023-09-25 PROCEDURE — 86850 RBC ANTIBODY SCREEN: CPT

## 2023-09-25 PROCEDURE — 2580000003 HC RX 258: Performed by: STUDENT IN AN ORGANIZED HEALTH CARE EDUCATION/TRAINING PROGRAM

## 2023-09-25 PROCEDURE — 85018 HEMOGLOBIN: CPT

## 2023-09-25 PROCEDURE — 2500000003 HC RX 250 WO HCPCS: Performed by: STUDENT IN AN ORGANIZED HEALTH CARE EDUCATION/TRAINING PROGRAM

## 2023-09-25 PROCEDURE — 86923 COMPATIBILITY TEST ELECTRIC: CPT

## 2023-09-25 PROCEDURE — P9047 ALBUMIN (HUMAN), 25%, 50ML: HCPCS | Performed by: STUDENT IN AN ORGANIZED HEALTH CARE EDUCATION/TRAINING PROGRAM

## 2023-09-25 PROCEDURE — 82962 GLUCOSE BLOOD TEST: CPT

## 2023-09-25 PROCEDURE — 85025 COMPLETE CBC W/AUTO DIFF WBC: CPT

## 2023-09-25 PROCEDURE — 86901 BLOOD TYPING SEROLOGIC RH(D): CPT

## 2023-09-25 PROCEDURE — C9113 INJ PANTOPRAZOLE SODIUM, VIA: HCPCS | Performed by: STUDENT IN AN ORGANIZED HEALTH CARE EDUCATION/TRAINING PROGRAM

## 2023-09-25 PROCEDURE — 80048 BASIC METABOLIC PNL TOTAL CA: CPT

## 2023-09-25 PROCEDURE — 6370000000 HC RX 637 (ALT 250 FOR IP): Performed by: CHIROPRACTOR

## 2023-09-25 PROCEDURE — 6360000002 HC RX W HCPCS

## 2023-09-25 PROCEDURE — 2700000000 HC OXYGEN THERAPY PER DAY

## 2023-09-25 PROCEDURE — 6370000000 HC RX 637 (ALT 250 FOR IP): Performed by: STUDENT IN AN ORGANIZED HEALTH CARE EDUCATION/TRAINING PROGRAM

## 2023-09-25 PROCEDURE — 2000000000 HC ICU R&B

## 2023-09-25 PROCEDURE — 6360000002 HC RX W HCPCS: Performed by: INTERNAL MEDICINE

## 2023-09-25 PROCEDURE — 6370000000 HC RX 637 (ALT 250 FOR IP): Performed by: FAMILY MEDICINE

## 2023-09-25 PROCEDURE — 99291 CRITICAL CARE FIRST HOUR: CPT | Performed by: INTERNAL MEDICINE

## 2023-09-25 PROCEDURE — 2580000003 HC RX 258: Performed by: FAMILY MEDICINE

## 2023-09-25 PROCEDURE — 85014 HEMATOCRIT: CPT

## 2023-09-25 PROCEDURE — A4216 STERILE WATER/SALINE, 10 ML: HCPCS | Performed by: STUDENT IN AN ORGANIZED HEALTH CARE EDUCATION/TRAINING PROGRAM

## 2023-09-25 PROCEDURE — P9016 RBC LEUKOCYTES REDUCED: HCPCS

## 2023-09-25 PROCEDURE — P9047 ALBUMIN (HUMAN), 25%, 50ML: HCPCS | Performed by: INTERNAL MEDICINE

## 2023-09-25 PROCEDURE — 86900 BLOOD TYPING SEROLOGIC ABO: CPT

## 2023-09-25 PROCEDURE — 6370000000 HC RX 637 (ALT 250 FOR IP): Performed by: INTERNAL MEDICINE

## 2023-09-25 PROCEDURE — 2580000003 HC RX 258: Performed by: INTERNAL MEDICINE

## 2023-09-25 PROCEDURE — 6360000002 HC RX W HCPCS: Performed by: STUDENT IN AN ORGANIZED HEALTH CARE EDUCATION/TRAINING PROGRAM

## 2023-09-25 PROCEDURE — 71045 X-RAY EXAM CHEST 1 VIEW: CPT

## 2023-09-25 RX ORDER — FUROSEMIDE 10 MG/ML
20 INJECTION INTRAMUSCULAR; INTRAVENOUS ONCE
Status: COMPLETED | OUTPATIENT
Start: 2023-09-25 | End: 2023-09-25

## 2023-09-25 RX ORDER — FENTANYL CITRATE 50 UG/ML
25 INJECTION, SOLUTION INTRAMUSCULAR; INTRAVENOUS ONCE
Status: COMPLETED | OUTPATIENT
Start: 2023-09-25 | End: 2023-09-25

## 2023-09-25 RX ORDER — FUROSEMIDE 10 MG/ML
20 INJECTION INTRAMUSCULAR; INTRAVENOUS ONCE
Status: DISCONTINUED | OUTPATIENT
Start: 2023-09-25 | End: 2023-09-25

## 2023-09-25 RX ORDER — ALBUMIN (HUMAN) 12.5 G/50ML
25 SOLUTION INTRAVENOUS EVERY 8 HOURS
Status: DISCONTINUED | OUTPATIENT
Start: 2023-09-25 | End: 2023-09-25

## 2023-09-25 RX ORDER — FENTANYL CITRATE 50 UG/ML
INJECTION, SOLUTION INTRAMUSCULAR; INTRAVENOUS
Status: COMPLETED
Start: 2023-09-25 | End: 2023-09-25

## 2023-09-25 RX ORDER — ALBUMIN (HUMAN) 12.5 G/50ML
25 SOLUTION INTRAVENOUS EVERY 8 HOURS
Status: COMPLETED | OUTPATIENT
Start: 2023-09-25 | End: 2023-09-26

## 2023-09-25 RX ORDER — SODIUM CHLORIDE 9 MG/ML
INJECTION, SOLUTION INTRAVENOUS PRN
Status: DISCONTINUED | OUTPATIENT
Start: 2023-09-25 | End: 2023-10-05 | Stop reason: HOSPADM

## 2023-09-25 RX ADMIN — SODIUM CHLORIDE, PRESERVATIVE FREE 10 ML: 5 INJECTION INTRAVENOUS at 21:06

## 2023-09-25 RX ADMIN — ALBUMIN (HUMAN) 25 G: 0.25 INJECTION, SOLUTION INTRAVENOUS at 07:05

## 2023-09-25 RX ADMIN — VALPROIC ACID 250 MG: 250 SOLUTION ORAL at 21:05

## 2023-09-25 RX ADMIN — FUROSEMIDE 20 MG: 10 INJECTION, SOLUTION INTRAMUSCULAR; INTRAVENOUS at 16:14

## 2023-09-25 RX ADMIN — ATORVASTATIN CALCIUM 10 MG: 10 TABLET, FILM COATED ORAL at 08:33

## 2023-09-25 RX ADMIN — PETROLATUM: 420 OINTMENT TOPICAL at 08:32

## 2023-09-25 RX ADMIN — PIPERACILLIN AND TAZOBACTAM 3375 MG: 3; .375 INJECTION, POWDER, LYOPHILIZED, FOR SOLUTION INTRAVENOUS at 21:09

## 2023-09-25 RX ADMIN — POLYETHYLENE GLYCOL 3350 17 G: 17 POWDER, FOR SOLUTION ORAL at 08:34

## 2023-09-25 RX ADMIN — PIPERACILLIN AND TAZOBACTAM 3375 MG: 3; .375 INJECTION, POWDER, LYOPHILIZED, FOR SOLUTION INTRAVENOUS at 11:46

## 2023-09-25 RX ADMIN — VALPROIC ACID 250 MG: 250 SOLUTION ORAL at 08:34

## 2023-09-25 RX ADMIN — SODIUM CHLORIDE, PRESERVATIVE FREE 10 ML: 5 INJECTION INTRAVENOUS at 08:33

## 2023-09-25 RX ADMIN — SODIUM CHLORIDE, PRESERVATIVE FREE 40 MG: 5 INJECTION INTRAVENOUS at 08:31

## 2023-09-25 RX ADMIN — MIRTAZAPINE 30 MG: 15 TABLET, FILM COATED ORAL at 21:27

## 2023-09-25 RX ADMIN — ALBUMIN (HUMAN) 25 G: 0.25 INJECTION, SOLUTION INTRAVENOUS at 01:16

## 2023-09-25 RX ADMIN — ALBUMIN (HUMAN) 25 G: 0.25 INJECTION, SOLUTION INTRAVENOUS at 15:45

## 2023-09-25 RX ADMIN — GUAIFENESIN 400 MG: 200 SOLUTION ORAL at 23:55

## 2023-09-25 RX ADMIN — FENTANYL CITRATE 25 MCG: 50 INJECTION INTRAMUSCULAR; INTRAVENOUS at 08:27

## 2023-09-25 RX ADMIN — WATER 50 MG: 1 INJECTION INTRAMUSCULAR; INTRAVENOUS; SUBCUTANEOUS at 21:06

## 2023-09-25 RX ADMIN — ALBUMIN (HUMAN) 25 G: 0.25 INJECTION, SOLUTION INTRAVENOUS at 23:55

## 2023-09-25 RX ADMIN — VALPROIC ACID 250 MG: 250 SOLUTION ORAL at 15:51

## 2023-09-25 RX ADMIN — METOPROLOL TARTRATE 50 MG: 50 TABLET ORAL at 21:05

## 2023-09-25 RX ADMIN — MEMANTINE 10 MG: 10 TABLET ORAL at 21:05

## 2023-09-25 RX ADMIN — COLLAGENASE SANTYL: 250 OINTMENT TOPICAL at 11:41

## 2023-09-25 RX ADMIN — THIAMINE HYDROCHLORIDE 100 MG: 100 INJECTION, SOLUTION INTRAMUSCULAR; INTRAVENOUS at 08:32

## 2023-09-25 RX ADMIN — MEMANTINE 10 MG: 10 TABLET ORAL at 08:33

## 2023-09-25 RX ADMIN — METOPROLOL TARTRATE 50 MG: 50 TABLET ORAL at 08:33

## 2023-09-25 RX ADMIN — SODIUM CHLORIDE, PRESERVATIVE FREE 40 MG: 5 INJECTION INTRAVENOUS at 21:05

## 2023-09-25 RX ADMIN — PETROLATUM: 420 OINTMENT TOPICAL at 21:06

## 2023-09-25 RX ADMIN — PIPERACILLIN AND TAZOBACTAM 3375 MG: 3; .375 INJECTION, POWDER, LYOPHILIZED, FOR SOLUTION INTRAVENOUS at 04:42

## 2023-09-25 RX ADMIN — FENTANYL CITRATE 25 MCG: 50 INJECTION, SOLUTION INTRAMUSCULAR; INTRAVENOUS at 08:27

## 2023-09-25 RX ADMIN — GUAIFENESIN 400 MG: 200 SOLUTION ORAL at 11:38

## 2023-09-25 ASSESSMENT — PAIN SCALES - PAIN ASSESSMENT IN ADVANCED DEMENTIA (PAINAD)
NEGVOCALIZATION: 0
BREATHING: 0
CONSOLABILITY: 0
NEGVOCALIZATION: 0
NEGVOCALIZATION: 0
BODYLANGUAGE: 0
BREATHING: 0
CONSOLABILITY: 0
BREATHING: 0
NEGVOCALIZATION: 0
TOTALSCORE: 0
BODYLANGUAGE: 0
BODYLANGUAGE: 0
CONSOLABILITY: 0
CONSOLABILITY: 0
FACIALEXPRESSION: 0
NEGVOCALIZATION: 0
BREATHING: 0
BREATHING: 0
NEGVOCALIZATION: 0
BREATHING: 0
CONSOLABILITY: 0
BODYLANGUAGE: 0
FACIALEXPRESSION: 0
TOTALSCORE: 0
BREATHING: 0
TOTALSCORE: 0
TOTALSCORE: 0
FACIALEXPRESSION: 0
BODYLANGUAGE: 0
BREATHING: 0
FACIALEXPRESSION: 0
TOTALSCORE: 0
TOTALSCORE: 0
NEGVOCALIZATION: 0
FACIALEXPRESSION: 0
CONSOLABILITY: 0
NEGVOCALIZATION: 0
CONSOLABILITY: 0
FACIALEXPRESSION: 0
TOTALSCORE: 0
CONSOLABILITY: 0
BREATHING: 0
NEGVOCALIZATION: 0
FACIALEXPRESSION: 0
BODYLANGUAGE: 0
TOTALSCORE: 0
CONSOLABILITY: 0
TOTALSCORE: 0
FACIALEXPRESSION: 0
BODYLANGUAGE: 0
BREATHING: 0
TOTALSCORE: 0
CONSOLABILITY: 0
NEGVOCALIZATION: 0
NEGVOCALIZATION: 0
BODYLANGUAGE: 0
FACIALEXPRESSION: 0
CONSOLABILITY: 0
TOTALSCORE: 0
BODYLANGUAGE: 0
NEGVOCALIZATION: 0
BREATHING: 0
TOTALSCORE: 0
FACIALEXPRESSION: 0
FACIALEXPRESSION: 0
CONSOLABILITY: 0
FACIALEXPRESSION: 0
BODYLANGUAGE: 0
BREATHING: 0

## 2023-09-25 ASSESSMENT — PAIN SCALES - GENERAL
PAINLEVEL_OUTOF10: 10
PAINLEVEL_OUTOF10: 0

## 2023-09-25 ASSESSMENT — PAIN DESCRIPTION - LOCATION: LOCATION: BACK

## 2023-09-25 NOTE — PROGRESS NOTES
Department of Internal Medicine  Infectious Diseases   Progress  Note      C/C :  Empyema /  leukocytosis   Pt is awake , no distress  Not really communicating   Afebrile           Current Facility-Administered Medications   Medication Dose Route Frequency Provider Last Rate Last Admin    0.9 % sodium chloride infusion   IntraVENous PRN Chun Biggs MD        hydrocortisone sodium succinate PF (SOLU-CORTEF) 50 mg in sterile water 2 mL injection  50 mg IntraVENous Daily Danilo Dumont MD   50 mg at 09/24/23 2141    metoprolol tartrate (LOPRESSOR) tablet 50 mg  50 mg Oral BID Odilia Tan MD   50 mg at 09/25/23 0833    polyethylene glycol (GLYCOLAX) packet 17 g  17 g Oral Daily Danilo Dumont MD   17 g at 09/25/23 0834    guaiFENesin (ROBITUSSIN) 100 MG/5ML liquid 400 mg  400 mg Oral Q12H Danilo Dumont MD   400 mg at 09/24/23 2249    thiamine (B-1) injection 100 mg  100 mg IntraVENous Daily Danilo Dumont MD   100 mg at 09/25/23 8443    white petrolatum ointment   Topical BID Mack Arnold MD   Given at 09/25/23 5582    And    white petrolatum ointment   Topical TID PRN Mack Arnold MD        collagenase ointment   Topical Daily Mack Arnold MD   Given at 09/24/23 1553    valproic acid (DEPAKENE) 250 MG/5ML oral solution 250 mg  250 mg Oral TID Linda Gonzalez MD   250 mg at 09/25/23 0834    pantoprazole (PROTONIX) 40 mg in sodium chloride (PF) 0.9 % 10 mL injection  40 mg IntraVENous Q12H Danilo Dumont MD   40 mg at 09/25/23 0831    glucose chewable tablet 16 g  4 tablet Oral PRN Danilo Dumont MD        dextrose bolus 10% 125 mL  125 mL IntraVENous PRN Danilo Dumont MD        Or    dextrose bolus 10% 250 mL  250 mL IntraVENous PRN Danilo Dumont MD        glucagon injection 1 mg  1 mg SubCUTAneous PRN Danilo Dumont MD        dextrose 10 % infusion   IntraVENous Continuous PRN Danilo Dumont MD        insulin lispro (HUMALOG) injection vial 0-4 Units  0-4 Units SubCUTAneous Q6H Aekta

## 2023-09-25 NOTE — PROGRESS NOTES
Hospitalist Progress Note      PCP: AMRIK PEACOCK III,     Date of Admission: 9/18/2023      Hospital Course: SOB< FOUND TO BE HYPOXIC  extremely large right pleural effusion with partially loculated hydropneumothorax with possible superimposed empyema a complete collapse of the right lung with a leftward shift. Patient required pressure support in which case patient was excepted for closer monitoring and further work-up and treatment in the intensive care unit. ** HAS COREPACK          Subjective:    RESTING QUIETLY        Medications:  Reviewed    Infusion Medications    sodium chloride      dextrose      sodium chloride       Scheduled Medications    albumin human 25%  25 g IntraVENous Q8H    hydrocortisone sodium succinate PF (SOLU-CORTEF) 50 mg in sterile water 2 mL injection  50 mg IntraVENous Daily    metoprolol tartrate  50 mg Oral BID    polyethylene glycol  17 g Oral Daily    guaiFENesin  400 mg Oral Q12H    thiamine  100 mg IntraVENous Daily    white petrolatum   Topical BID    collagenase   Topical Daily    valproic acid  250 mg Oral TID    pantoprazole (PROTONIX) 40 mg in sodium chloride (PF) 0.9 % 10 mL injection  40 mg IntraVENous Q12H    insulin lispro  0-4 Units SubCUTAneous Q6H    piperacillin-tazobactam  3,375 mg IntraVENous Q8H    [Held by provider] apixaban  5 mg Oral BID    aspirin  81 mg Oral Daily    atorvastatin  10 mg Oral Daily    [Held by provider] donepezil  10 mg Oral Nightly    memantine  10 mg Oral BID    mirtazapine  30 mg Oral Nightly    sodium chloride flush  5-40 mL IntraVENous 2 times per day     PRN Meds: sodium chloride, white petrolatum **AND** white petrolatum, glucose, dextrose bolus **OR** dextrose bolus, glucagon (rDNA), dextrose, sodium chloride flush, sodium chloride, acetaminophen **OR** acetaminophen      Intake/Output Summary (Last 24 hours) at 9/25/2023 1838  Last data filed at 9/25/2023 1735  Gross per 24 hour   Intake 3161.93 ml   Output 2420 ml   Net hours; 55; 100; Q 1 hour; Protein; BID  Code Status: DNR-CCA     PT/OT Eval Status:  WHEN STABLE     Dispo -  [facility]    Electronically signed by Alee Graham DO on 9/25/2023 at 6:38 PM [facility]

## 2023-09-25 NOTE — PROGRESS NOTES
[address] [facility] PROGRESS NOTE    Patient: Sapphire Bourgeois  MRN: [id number]  : 1940    Encounter Date: 2023  Encounter Time: 2:12 PM     Date of Admission: .2023 11:02 AM    Consulting Physician:  Primary Care Physician:     Thao Camejo III, DO     [phone]    Reason for Consultation: Pleural Effusion     Problem List:  Patient Active Problem List   Diagnosis    Essential hypertension    Acute colitis    Nausea and vomiting    Depression    Dementia ([address])    Cholelithiases    HTN (hypertension)    Leukocytosis    Shingles    Biliary colic    Sepsis ([address])    New onset atrial fibrillation ([address])    Pleural effusion on right    Empyema of lung ([address])    Pleural effusion     SUBJECTIVE: Patient seen and examined. Overnight the patient had no shortness of breath, cough, increased work of breathing. Afebrile  FiO2 down to 7 L  Chest tube output 120 cc    HOME MEDICATIONS:  Prior to Admission medications    Medication Sig Start Date End Date Taking?  Authorizing Provider   acetaminophen (TYLENOL) 325 MG tablet Take 2 tablets by mouth every 6 hours as needed for Pain or Fever   Yes Historical Provider, MD   ferrous sulfate (IRON 325) 325 (65 Fe) MG tablet Take 1 tablet by mouth daily (with breakfast)   Yes Historical Provider, MD   folic acid (FOLVITE) 1 MG tablet Take 1 tablet by mouth daily   Yes Historical Provider, MD   memantine (NAMENDA) 10 MG tablet Take 1 tablet by mouth 2 times daily   Yes Historical Provider, MD   vitamin C (ASCORBIC ACID) 500 MG tablet Take 2 tablets by mouth daily   Yes Historical Provider, MD   divalproex (DEPAKOTE) 250 MG DR tablet Take 1 tablet by mouth 3 times daily 23   Mitesh Mejia, DO   vitamin D (CHOLECALCIFEROL) 50 MCG (2000) TABS tablet Take 1 tablet by mouth daily 23   Mitesh Mejia, DO   apixaban (ELIQUIS) 5 MG TABS tablet Take 1 tablet by mouth 2 times daily 23   Juan Luis Fiore last 72 hours. ________________________________________________________________________    IV ACCESS:    NUTRITION: Diet NPO  ADULT TUBE FEEDING; Nasoenteric; Peptide Based High Protein; Continuous; 10; Yes; 10; Q 4 hours; 55; 100; Q 4 hours; Protein; BID    INTAKE/OUTPUTS:  I/O last 3 completed shifts: In: 2480 [NG/GT:2480]  Out: 3035 [Urine:1300; Chest Tube:1075]    Intake/Output Summary (Last 24 hours) at 9/25/2023 1412  Last data filed at 9/25/2023 1239  Gross per 24 hour   Intake 1037 ml   Output 1620 ml   Net -583 ml       General Appearance:  well-developed and   well-nourished, in no acute distress   Eyes: pupils equal, round, and reactive to light, extraocular eye movements intact, conjunctivae normal and sclera anicteric   Neck: neck supple and non tender without mass, no thyromegaly, no thyroid nodules and no cervical adenopathy   Pulmonary/Chest: decreased breath sounds at bases, no accessory muscles of inspiration noted.   Right pigtail catheter in place  Cardiovascular: normal rate, regular rhythm, normal S1 and S2, no murmurs, rubs, clicks or gallops, distal pulses intact, no carotid bruits, no murmurs, no gallops, no carotid bruits and no JVD   Abdomen: soft, non-tender, non-distended, normal bowel sounds, no masses or organomegaly   Extremities: no cyanosis, no clubbing, ++ edema  Musculoskeletal: normal range of motion, no joint swelling, deformity or tenderness   Neurologic: reflexes normal and symmetric, no cranial nerve deficit noted    LABS/IMAGING:    CBC:  Lab Results   Component Value Date    WBC 12.3 (H) 09/25/2023    HGB 6.8 (LL) 09/25/2023    HCT 22.4 (L) 09/25/2023    MCV 91.1 09/25/2023     09/25/2023    LYMPHOPCT 8 (L) 09/25/2023    RBC 2.46 (L) 09/25/2023    MCH 27.6 09/25/2023    MCHC 30.4 (L) 09/25/2023    RDW 17.7 (H) 09/25/2023    NEUTOPHILPCT 89 (H) 09/25/2023    MONOPCT 4 09/25/2023    BASOPCT 0 09/25/2023    NEUTROABS 10.91 (H) 09/25/2023    LYMPHSABS 0.96 (L) 09/25/2023    MONOSABS 0.43 09/25/2023    EOSABS 0 (L) 09/25/2023    BASOSABS 0 09/25/2023       Recent Labs     09/25/23  0443 09/24/23  0413 09/23/23  0915   WBC 12.3* 21.5* 24.3*   HGB 6.8* 8.4* 8.5*   HCT 22.4* 26.7* 25.8*   MCV 91.1 87.8 86.9    442 415       BMP:   Recent Labs     09/23/23  0423 09/23/23  1800 09/24/23  0413 09/25/23  0443     --  140 144   K 4.5  --  4.0 4.1     --  107 110*   CO2 24  --  22 24   PHOS 2.5 2.1* 2.0*  --    BUN 26*  --  31* 29*   CREATININE 0.6  --  0.6 0.4*       MG:   Lab Results   Component Value Date/Time    MG 2.2 09/24/2023 04:13 AM     Ca/Phos:   Lab Results   Component Value Date    CALCIUM 8.1 (L) 09/25/2023    PHOS 2.0 (L) 09/24/2023     Amylase: No results found for: \"AMYLASE\"  Lipase:   Lab Results   Component Value Date    LIPASE 33 09/10/2021     LIVER PROFILE:   Recent Labs     09/23/23  0850 09/24/23 0413   AST 22 33*   ALT 8 12   BILIDIR <0.2 <0.2   BILITOT 0.4 0.4   ALKPHOS 113* 125*       PT/INR: No results for input(s): \"PROTIME\", \"INR\" in the last 72 hours. APTT: No results for input(s): \"APTT\" in the last 72 hours.     Cardiac Enzymes:  Lab Results   Component Value Date    TROPONINI <0.01 01/27/2020       Hgb A1C:   Lab Results   Component Value Date    LABA1C 5.6 09/06/2021     No results found for: \"EAG\"  MARILOU: No results found for: \"MARILOU\"  ESR:   Lab Results   Component Value Date    SEDRATE 25 (H) 07/04/2023     CRP:   Lab Results   Component Value Date    CRP 33.8 (H) 07/04/2023     D Dimer:   Lab Results   Component Value Date    DDIMER 735 (H) 09/18/2023     Folate and B12: No results found for: \"[id number]\", No results found for: \"FOLATE\"    Lactic Acid:   Lab Results   Component Value Date    LACTA 4.0 ([facility]) 09/18/2023     Ammonia:   Cortisol:  Thyroid Studies:  Lab Results   Component Value Date    TSH 5.07 (H) 09/19/2023     Bilateral congestion (right  ASSESSMENT:  Severe Sepsis, status post septic Shock  Right Pleural

## 2023-09-26 ENCOUNTER — APPOINTMENT (OUTPATIENT)
Dept: GENERAL RADIOLOGY | Age: 83
End: 2023-09-26
Payer: MEDICARE

## 2023-09-26 LAB
ABO/RH: NORMAL
ANION GAP SERPL CALCULATED.3IONS-SCNC: 12 MMOL/L (ref 7–16)
ANTIBODY SCREEN: NEGATIVE
ARM BAND NUMBER: NORMAL
BASOPHILS # BLD: 0.02 K/UL (ref 0–0.2)
BASOPHILS NFR BLD: 0 % (ref 0–2)
BLOOD BANK BLOOD PRODUCT EXPIRATION DATE: NORMAL
BLOOD BANK DISPENSE STATUS: NORMAL
BLOOD BANK ISBT PRODUCT BLOOD TYPE: 9500
BLOOD BANK PRODUCT CODE: NORMAL
BLOOD BANK SAMPLE EXPIRATION: NORMAL
BLOOD BANK UNIT TYPE AND RH: NORMAL
BPU ID: NORMAL
BUN SERPL-MCNC: 37 MG/DL (ref 6–23)
CALCIUM SERPL-MCNC: 8.7 MG/DL (ref 8.6–10.2)
CHLORIDE SERPL-SCNC: 107 MMOL/L (ref 98–107)
CO2 SERPL-SCNC: 26 MMOL/L (ref 22–29)
COMPONENT: NORMAL
CREAT SERPL-MCNC: 0.6 MG/DL (ref 0.5–1)
CROSSMATCH RESULT: NORMAL
EOSINOPHIL # BLD: 0.01 K/UL (ref 0.05–0.5)
EOSINOPHILS RELATIVE PERCENT: 0 % (ref 0–6)
ERYTHROCYTE [DISTWIDTH] IN BLOOD BY AUTOMATED COUNT: 18.6 % (ref 11.5–15)
GFR SERPL CREATININE-BSD FRML MDRD: >60 ML/MIN/1.73M2
GLUCOSE BLD-MCNC: 149 MG/DL (ref 74–99)
GLUCOSE BLD-MCNC: 150 MG/DL (ref 74–99)
GLUCOSE BLD-MCNC: 151 MG/DL (ref 74–99)
GLUCOSE SERPL-MCNC: 148 MG/DL (ref 74–99)
HCT VFR BLD AUTO: 23.6 % (ref 34–48)
HCT VFR BLD AUTO: 26 % (ref 34–48)
HGB BLD-MCNC: 7.5 G/DL (ref 11.5–15.5)
HGB BLD-MCNC: 8.2 G/DL (ref 11.5–15.5)
IMM GRANULOCYTES # BLD AUTO: 0.48 K/UL (ref 0–0.58)
IMM GRANULOCYTES NFR BLD: 4 % (ref 0–5)
LYMPHOCYTES NFR BLD: 0.89 K/UL (ref 1.5–4)
LYMPHOCYTES RELATIVE PERCENT: 7 % (ref 20–42)
MCH RBC QN AUTO: 27.6 PG (ref 26–35)
MCHC RBC AUTO-ENTMCNC: 31.8 G/DL (ref 32–34.5)
MCV RBC AUTO: 86.8 FL (ref 80–99.9)
MONOCYTES NFR BLD: 0.76 K/UL (ref 0.1–0.95)
MONOCYTES NFR BLD: 6 % (ref 2–12)
NEUTROPHILS NFR BLD: 83 % (ref 43–80)
NEUTS SEG NFR BLD: 10.35 K/UL (ref 1.8–7.3)
PLATELET # BLD AUTO: 368 K/UL (ref 130–450)
PMV BLD AUTO: 9.9 FL (ref 7–12)
POTASSIUM SERPL-SCNC: 4.3 MMOL/L (ref 3.5–5)
RBC # BLD AUTO: 2.72 M/UL (ref 3.5–5.5)
RBC # BLD: ABNORMAL 10*6/UL
SODIUM SERPL-SCNC: 145 MMOL/L (ref 132–146)
TRANSFUSION STATUS: NORMAL
UNIT DIVISION: 0
UNIT ISSUE DATE/TIME: NORMAL
WBC OTHER # BLD: 12.5 K/UL (ref 4.5–11.5)

## 2023-09-26 PROCEDURE — 2580000003 HC RX 258: Performed by: INTERNAL MEDICINE

## 2023-09-26 PROCEDURE — 82962 GLUCOSE BLOOD TEST: CPT

## 2023-09-26 PROCEDURE — C9113 INJ PANTOPRAZOLE SODIUM, VIA: HCPCS | Performed by: STUDENT IN AN ORGANIZED HEALTH CARE EDUCATION/TRAINING PROGRAM

## 2023-09-26 PROCEDURE — 6360000002 HC RX W HCPCS: Performed by: INTERNAL MEDICINE

## 2023-09-26 PROCEDURE — A4216 STERILE WATER/SALINE, 10 ML: HCPCS | Performed by: STUDENT IN AN ORGANIZED HEALTH CARE EDUCATION/TRAINING PROGRAM

## 2023-09-26 PROCEDURE — 2700000000 HC OXYGEN THERAPY PER DAY

## 2023-09-26 PROCEDURE — 2580000003 HC RX 258: Performed by: FAMILY MEDICINE

## 2023-09-26 PROCEDURE — 87040 BLOOD CULTURE FOR BACTERIA: CPT

## 2023-09-26 PROCEDURE — 87077 CULTURE AEROBIC IDENTIFY: CPT

## 2023-09-26 PROCEDURE — 99233 SBSQ HOSP IP/OBS HIGH 50: CPT | Performed by: INTERNAL MEDICINE

## 2023-09-26 PROCEDURE — 6360000002 HC RX W HCPCS: Performed by: STUDENT IN AN ORGANIZED HEALTH CARE EDUCATION/TRAINING PROGRAM

## 2023-09-26 PROCEDURE — 2500000003 HC RX 250 WO HCPCS: Performed by: STUDENT IN AN ORGANIZED HEALTH CARE EDUCATION/TRAINING PROGRAM

## 2023-09-26 PROCEDURE — 97530 THERAPEUTIC ACTIVITIES: CPT

## 2023-09-26 PROCEDURE — 2060000000 HC ICU INTERMEDIATE R&B

## 2023-09-26 PROCEDURE — 6370000000 HC RX 637 (ALT 250 FOR IP): Performed by: FAMILY MEDICINE

## 2023-09-26 PROCEDURE — 80048 BASIC METABOLIC PNL TOTAL CA: CPT

## 2023-09-26 PROCEDURE — 71045 X-RAY EXAM CHEST 1 VIEW: CPT

## 2023-09-26 PROCEDURE — 85025 COMPLETE CBC W/AUTO DIFF WBC: CPT

## 2023-09-26 PROCEDURE — P9047 ALBUMIN (HUMAN), 25%, 50ML: HCPCS | Performed by: INTERNAL MEDICINE

## 2023-09-26 PROCEDURE — 6370000000 HC RX 637 (ALT 250 FOR IP): Performed by: CHIROPRACTOR

## 2023-09-26 PROCEDURE — 87205 SMEAR GRAM STAIN: CPT

## 2023-09-26 PROCEDURE — 6370000000 HC RX 637 (ALT 250 FOR IP): Performed by: STUDENT IN AN ORGANIZED HEALTH CARE EDUCATION/TRAINING PROGRAM

## 2023-09-26 PROCEDURE — 6370000000 HC RX 637 (ALT 250 FOR IP): Performed by: INTERNAL MEDICINE

## 2023-09-26 PROCEDURE — 2580000003 HC RX 258: Performed by: STUDENT IN AN ORGANIZED HEALTH CARE EDUCATION/TRAINING PROGRAM

## 2023-09-26 PROCEDURE — 87070 CULTURE OTHR SPECIMN AEROBIC: CPT

## 2023-09-26 PROCEDURE — 97162 PT EVAL MOD COMPLEX 30 MIN: CPT

## 2023-09-26 RX ADMIN — SODIUM CHLORIDE, PRESERVATIVE FREE 10 ML: 5 INJECTION INTRAVENOUS at 09:31

## 2023-09-26 RX ADMIN — VALPROIC ACID 250 MG: 250 SOLUTION ORAL at 09:20

## 2023-09-26 RX ADMIN — PIPERACILLIN AND TAZOBACTAM 3375 MG: 3; .375 INJECTION, POWDER, LYOPHILIZED, FOR SOLUTION INTRAVENOUS at 12:27

## 2023-09-26 RX ADMIN — ASPIRIN 81 MG CHEWABLE TABLET 81 MG: 81 TABLET CHEWABLE at 09:20

## 2023-09-26 RX ADMIN — VALPROIC ACID 250 MG: 250 SOLUTION ORAL at 14:30

## 2023-09-26 RX ADMIN — GUAIFENESIN 400 MG: 200 SOLUTION ORAL at 09:20

## 2023-09-26 RX ADMIN — MIRTAZAPINE 30 MG: 15 TABLET, FILM COATED ORAL at 21:21

## 2023-09-26 RX ADMIN — ALBUMIN (HUMAN) 25 G: 0.25 INJECTION, SOLUTION INTRAVENOUS at 07:11

## 2023-09-26 RX ADMIN — SODIUM CHLORIDE, PRESERVATIVE FREE 10 ML: 5 INJECTION INTRAVENOUS at 21:21

## 2023-09-26 RX ADMIN — PETROLATUM: 420 OINTMENT TOPICAL at 09:31

## 2023-09-26 RX ADMIN — PIPERACILLIN AND TAZOBACTAM 3375 MG: 3; .375 INJECTION, POWDER, LYOPHILIZED, FOR SOLUTION INTRAVENOUS at 22:28

## 2023-09-26 RX ADMIN — WATER 50 MG: 1 INJECTION INTRAMUSCULAR; INTRAVENOUS; SUBCUTANEOUS at 21:20

## 2023-09-26 RX ADMIN — PETROLATUM: 420 OINTMENT TOPICAL at 21:21

## 2023-09-26 RX ADMIN — GUAIFENESIN 400 MG: 200 SOLUTION ORAL at 21:42

## 2023-09-26 RX ADMIN — SODIUM CHLORIDE, PRESERVATIVE FREE 40 MG: 5 INJECTION INTRAVENOUS at 09:20

## 2023-09-26 RX ADMIN — MEMANTINE 10 MG: 10 TABLET ORAL at 09:26

## 2023-09-26 RX ADMIN — MEMANTINE 10 MG: 10 TABLET ORAL at 21:42

## 2023-09-26 RX ADMIN — COLLAGENASE SANTYL: 250 OINTMENT TOPICAL at 09:41

## 2023-09-26 RX ADMIN — SODIUM CHLORIDE, PRESERVATIVE FREE 40 MG: 5 INJECTION INTRAVENOUS at 21:20

## 2023-09-26 RX ADMIN — ATORVASTATIN CALCIUM 10 MG: 10 TABLET, FILM COATED ORAL at 09:20

## 2023-09-26 RX ADMIN — VALPROIC ACID 250 MG: 250 SOLUTION ORAL at 21:20

## 2023-09-26 RX ADMIN — POLYETHYLENE GLYCOL 3350 17 G: 17 POWDER, FOR SOLUTION ORAL at 09:21

## 2023-09-26 RX ADMIN — THIAMINE HYDROCHLORIDE 100 MG: 100 INJECTION, SOLUTION INTRAMUSCULAR; INTRAVENOUS at 09:20

## 2023-09-26 RX ADMIN — PIPERACILLIN AND TAZOBACTAM 3375 MG: 3; .375 INJECTION, POWDER, LYOPHILIZED, FOR SOLUTION INTRAVENOUS at 04:47

## 2023-09-26 RX ADMIN — METOPROLOL TARTRATE 50 MG: 50 TABLET ORAL at 21:20

## 2023-09-26 RX ADMIN — APIXABAN 5 MG: 5 TABLET, FILM COATED ORAL at 21:20

## 2023-09-26 ASSESSMENT — PAIN SCALES - PAIN ASSESSMENT IN ADVANCED DEMENTIA (PAINAD)
BREATHING: 0
TOTALSCORE: 0
CONSOLABILITY: 0
BODYLANGUAGE: 0
FACIALEXPRESSION: 0
BODYLANGUAGE: 0
CONSOLABILITY: 0
CONSOLABILITY: 0
BODYLANGUAGE: 0
BREATHING: 0
FACIALEXPRESSION: 0
CONSOLABILITY: 0
TOTALSCORE: 0
FACIALEXPRESSION: 0
NEGVOCALIZATION: 0
FACIALEXPRESSION: 0
FACIALEXPRESSION: 0
BREATHING: 0
NEGVOCALIZATION: 0
BODYLANGUAGE: 0
NEGVOCALIZATION: 0
TOTALSCORE: 0
BODYLANGUAGE: 0
BODYLANGUAGE: 0
FACIALEXPRESSION: 0
FACIALEXPRESSION: 0
BREATHING: 0
NEGVOCALIZATION: 0
TOTALSCORE: 0
CONSOLABILITY: 0
NEGVOCALIZATION: 0
BREATHING: 0
TOTALSCORE: 0
TOTALSCORE: 0
NEGVOCALIZATION: 0
FACIALEXPRESSION: 0
NEGVOCALIZATION: 0
CONSOLABILITY: 0
BREATHING: 0
TOTALSCORE: 0
NEGVOCALIZATION: 0
NEGVOCALIZATION: 0
BREATHING: 0
CONSOLABILITY: 0
CONSOLABILITY: 0
TOTALSCORE: 0
BREATHING: 0
BREATHING: 0
TOTALSCORE: 0
TOTALSCORE: 0
BREATHING: 0
CONSOLABILITY: 0
BODYLANGUAGE: 0
BREATHING: 0
CONSOLABILITY: 0
NEGVOCALIZATION: 0
FACIALEXPRESSION: 0
BODYLANGUAGE: 0
FACIALEXPRESSION: 0
FACIALEXPRESSION: 0
TOTALSCORE: 0
BODYLANGUAGE: 0
BODYLANGUAGE: 0
NEGVOCALIZATION: 0
BODYLANGUAGE: 0
CONSOLABILITY: 0

## 2023-09-26 ASSESSMENT — PAIN SCALES - GENERAL
PAINLEVEL_OUTOF10: 0
PAINLEVEL_OUTOF10: 1
PAINLEVEL_OUTOF10: 0

## 2023-09-26 NOTE — PROGRESS NOTES
[address]  Department of Pulmonary, Critical Care and Sleep Medicine  [address] [place] [address]   Department of Internal Medicine        During multidisciplinary team rounds Radha Lea is a 79 y.o. female was seen, examined and discussed. This is confirmation that I have personally seen and examined the patient and that the key elements of the encounter were performed by me (> 85 % time). The medications & laboratory data was discussed and adjusted where necessary. The radiographic images were reviewed or with radiologist or consultant if felt dis-concordant with the exam or history. The above findings were corroborated, plans confirmed and changes made if needed. Family is updated at the bedside as available. Key issues of the case were discussed among consultants. Critical Care time is documented if appropriate. Continue current antibiotics   Maintain chest tube to water seal   BP stable. Restart eliquis  Wean hydrocortisone  Final pleural fluid culture results pending. Ok to transfer out of ICU today.       Thu Gutierrez DO

## 2023-09-26 NOTE — CARE COORDINATION
Care Coordination: LOS 8 day s/p Pig tail drain- R pl effusion. IV zosyn q8. ID following cultures. Hx dementia. Long term care at [address] [facility]. Requesting therapy evals prior to discharge. Per POA, plan is to return to facility.  DARRION and transport envelope completed    Electronically signed by Milla Graff RN on 9/26/2023 at 11:48 AM

## 2023-09-26 NOTE — PROGRESS NOTES
Physician Progress Note      PATIENT:               Kristie Parks  CSN #:                  [id number]  :                       1940  ADMIT DATE:       2023 11:02 AM  DISCH DATE:  Stephan Weir  PROVIDER #:        Heriberto Farrar DO          QUERY TEXT:    Pt admitted with empyema, acute hypoxic respiratory failure and metabolic   encephalopathy. Noted documentation of Sepsis with septic shock in the   Critical Care consult note. If possible, please document in progress notes   and discharge summary:    The medical record reflects the following:  Risk Factors: NH resident, dementia, Respiratory failure, metabolic   encephalopathy  Clinical Indicators: Temp 93. 5(R), HR , RR 16-22, BP 68/45-90/58, MAPS   54-62, SpO2 77-88%, WBC 24.7, Lactic acid 2.9-3.2; Treatment: IVFB 1L, IV Levophed, IV Maxipime & Vanco, BCx2, UC pending,   Throacentesis with chest tube, labs and monitoring    Thank you,  Rose Mary Lopez   Clinical Documentation Improvement Specialist  W: [phone]  Options provided:  -- Sepsis with septic shock, present on admission  -- Sepsis with septic shock ruled out  -- Defer to Critical Care consultant documentation regarding Sepsis with   septic shock  -- Other - I will add my own diagnosis  -- Disagree - Not applicable / Not valid  -- Disagree - Clinically unable to determine / Unknown  -- Refer to Clinical Documentation Reviewer    PROVIDER RESPONSE TEXT:    This patient has sepsis with septic shock which was present on admission.     Query created by: Rose Mary Lopez on 2023 1:14 PM      Electronically signed by:  Heriberto Farrar DO 2023 10:57 AM

## 2023-09-26 NOTE — PROGRESS NOTES
Department of Internal Medicine  Infectious Diseases   Progress  Note      C/C :  Empyema /  leukocytosis   Pt is awake , no distress  Not really communicating   Afebrile           Current Facility-Administered Medications   Medication Dose Route Frequency Provider Last Rate Last Admin    0.9 % sodium chloride infusion   IntraVENous PRN Jesse Palacios MD        hydrocortisone sodium succinate PF (SOLU-CORTEF) 50 mg in sterile water 2 mL injection  50 mg IntraVENous Daily Jamilah Rodriguez MD   50 mg at 09/25/23 2106    metoprolol tartrate (LOPRESSOR) tablet 50 mg  50 mg Oral BID Jacqui Grey MD   50 mg at 09/25/23 2105    polyethylene glycol (GLYCOLAX) packet 17 g  17 g Oral Daily Jamilah Rodriguez MD   17 g at 09/26/23 0921    guaiFENesin (ROBITUSSIN) 100 MG/5ML liquid 400 mg  400 mg Oral Q12H Jamilah Rodriguez MD   400 mg at 09/26/23 0920    thiamine (B-1) injection 100 mg  100 mg IntraVENous Daily Jamilah Rodriguez MD   100 mg at 09/26/23 0920    white petrolatum ointment   Topical BID Casi Villarreal MD   Given at 09/26/23 2755    And    white petrolatum ointment   Topical TID PRN Casi Villarreal MD        collagenase ointment   Topical Daily Casi Villarreal MD   Given at 09/26/23 0941    valproic acid (DEPAKENE) 250 MG/5ML oral solution 250 mg  250 mg Oral TID Reji Currie MD   250 mg at 09/26/23 0920    pantoprazole (PROTONIX) 40 mg in sodium chloride (PF) 0.9 % 10 mL injection  40 mg IntraVENous Q12H Jamilah Rodriguez MD   40 mg at 09/26/23 0920    glucose chewable tablet 16 g  4 tablet Oral PRN Jamilah Rodriguez MD        dextrose bolus 10% 125 mL  125 mL IntraVENous PRN Jamilah Rodriguez MD        Or    dextrose bolus 10% 250 mL  250 mL IntraVENous PRN Jamilah Rodriguez MD        glucagon injection 1 mg  1 mg SubCUTAneous PRN Jamilah Rodriguez MD        dextrose 10 % infusion   IntraVENous Continuous PRN Jamilah Rodriguez MD        insulin lispro (HUMALOG) injection vial 0-4 Units  0-4 Units SubCUTAneous Q6H Aekta NO EPITHELIAL CELLS     NO ORGANISMS SEEN    Culture Evaluating for: MIXED GRAM POSITIVE ORGANISMS AND MIXED GRAM NEGATIVE ORGANISMS         ASCITIC FLUID: (?)   Satisfactory for evaluation. NEGATIVE FOR MALIGNANCY. Fibrinopurulent exudate and blood, consistent with changes of   suppurative peritonitis. Radiology :    CT chest -  Impression:        1. No evidence of pulmonary embolism. 2. Extremely large right pleural effusion with partially loculated anterior   hydropneumothorax component, possibly related to superimposed empyema at the   anterior right base and extending to the anterior inferior right pulmonary   hilum. 3. Complete collapse of the right lung with mild leftward shift of the cardio   mediastinum secondary to #2 above. IMPRESSION:     Right pleural effusion-( exudative )  r/o empyema s/p pig tail drainage ( 9/20)   Leukocytosis - 12  K     RECOMMENDATIONS:       1. IV zosyn  3.375 grams q 8 hrs  2. Await cx- mixed oh   4.   On solu-[facility]     9/26/2023  Cb Cummings MD

## 2023-09-26 NOTE — PROGRESS NOTES
[address] [facility] PROGRESS NOTE    Patient: Xander Escobar  MRN: [id number]  : 1940    Encounter Date: 2023  Encounter Time: 2:33 PM     Date of Admission: .2023 11:02 AM    Consulting Physician:  Primary Care Physician:     Cinthya Panda III, DO     [phone]    Reason for Consultation: Pleural Effusion     Problem List:  Patient Active Problem List   Diagnosis    Essential hypertension    Acute colitis    Nausea and vomiting    Depression    Dementia ([address])    Cholelithiases    HTN (hypertension)    Leukocytosis    Shingles    Biliary colic    Sepsis ([address])    New onset atrial fibrillation ([address])    Pleural effusion on right    Empyema of lung ([address])    Pleural effusion     SUBJECTIVE: Patient seen and examined. Overnight the patient had no shortness of breath, cough, increased work of breathing. Afebrile  FiO2 down to 7 L  Chest tube output 120 cc    : High chest tube output overnight, up to 1 L. Good diuresis with a combination of Lasix and albumin FiO2 down to 3 L. HOME MEDICATIONS:  Prior to Admission medications    Medication Sig Start Date End Date Taking?  Authorizing Provider   acetaminophen (TYLENOL) 325 MG tablet Take 2 tablets by mouth every 6 hours as needed for Pain or Fever   Yes Historical Provider, MD   ferrous sulfate (IRON 325) 325 (65 Fe) MG tablet Take 1 tablet by mouth daily (with breakfast)   Yes Historical Provider, MD   folic acid (FOLVITE) 1 MG tablet Take 1 tablet by mouth daily   Yes Historical Provider, MD   memantine (NAMENDA) 10 MG tablet Take 1 tablet by mouth 2 times daily   Yes Historical Provider, MD   vitamin C (ASCORBIC ACID) 500 MG tablet Take 2 tablets by mouth daily   Yes Historical Provider, MD   divalproex (DEPAKOTE) 250 MG DR tablet Take 1 tablet by mouth 3 times daily 23   Mitesh Sharp DO   vitamin D (CHOLECALCIFEROL) 50 MCG (2000) TABS tablet Take 1 tablet by mouth daily 23 7 (L) 09/26/2023    RBC 2.72 (L) 09/26/2023    MCH 27.6 09/26/2023    MCHC 31.8 (L) 09/26/2023    RDW 18.6 (H) 09/26/2023    NEUTOPHILPCT 83 (H) 09/26/2023    MONOPCT 6 09/26/2023    BASOPCT 0 09/26/2023    NEUTROABS 10.35 (H) 09/26/2023    LYMPHSABS 0.89 (L) 09/26/2023    MONOSABS 0.76 09/26/2023    EOSABS 0.01 (L) 09/26/2023    BASOSABS 0.02 09/26/2023       Recent Labs     09/26/23  0438 09/25/23  2345 09/25/23  1534 09/25/23 0443 09/24/23 0413   WBC 12.5*  --   --  12.3* 21.5*   HGB 7.5* 8.2* 7.8* 6.8* 8.4*   HCT 23.6* 26.0* 24.6* 22.4* 26.7*   MCV 86.8  --   --  91.1 87.8     --   --  349 442       BMP:   Recent Labs     09/23/23  1800 09/24/23 0413 09/25/23 0443 09/26/23 0438   NA  --  140 144 145   K  --  4.0 4.1 4.3   CL  --  107 110* 107   CO2  --  22 24 26   PHOS 2.1* 2.0*  --   --    BUN  --  31* 29* 37*   CREATININE  --  0.6 0.4* 0.6       MG:   Lab Results   Component Value Date/Time    MG 2.2 09/24/2023 04:13 AM     Ca/Phos:   Lab Results   Component Value Date    CALCIUM 8.7 09/26/2023    PHOS 2.0 (L) 09/24/2023     Amylase: No results found for: \"AMYLASE\"  Lipase:   Lab Results   Component Value Date    LIPASE 33 09/10/2021     LIVER PROFILE:   Recent Labs     09/24/23 0413   AST 33*   ALT 12   BILIDIR <0.2   BILITOT 0.4   ALKPHOS 125*       PT/INR: No results for input(s): \"PROTIME\", \"INR\" in the last 72 hours. APTT: No results for input(s): \"APTT\" in the last 72 hours.     Cardiac Enzymes:  Lab Results   Component Value Date    TROPONINI <0.01 01/27/2020       Hgb A1C:   Lab Results   Component Value Date    LABA1C 5.6 09/06/2021     No results found for: \"EAG\"  MARILOU: No results found for: \"MARILOU\"  ESR:   Lab Results   Component Value Date    SEDRATE 25 (H) 07/04/2023     CRP:   Lab Results   Component Value Date    CRP 33.8 (H) 07/04/2023     D Dimer:   Lab Results   Component Value Date    DDIMER 735 (H) 09/18/2023     Folate and B12: No results found for: \"HTFAICYS74\", No results found for: \"FOLATE\"    Lactic Acid:   Lab Results   Component Value Date    LACTA 4.0 ([facility]) 09/18/2023     Ammonia:   Cortisol:  Thyroid Studies:  Lab Results   Component Value Date    TSH 5.07 (H) 09/19/2023     X-rays today with decreased congestion  ASSESSMENT:  Severe Sepsis, status post septic Shock  Right Pleural Effusion, R/O Empyema; culture with gram-positive & gram-negative rods  Leukocytosis  H/O Pulmonary Embolism   H/O Atrial Fibrillation     PLAN:  Zosyn by ID  Wean O2, IS  Dornase alpha and tPA per MIST II protocol completed  Gentle diuresis  Supportive care  Okay to DC ICU. Discussed with CCM team.    Thank you Thomas Christie, DO very much for allowing me to see this patient in consultation and follow up.     Portia Spain MD,

## 2023-09-27 ENCOUNTER — APPOINTMENT (OUTPATIENT)
Dept: GENERAL RADIOLOGY | Age: 83
End: 2023-09-27
Payer: MEDICARE

## 2023-09-27 LAB
ANION GAP SERPL CALCULATED.3IONS-SCNC: 11 MMOL/L (ref 7–16)
BASOPHILS # BLD: 0.04 K/UL (ref 0–0.2)
BASOPHILS NFR BLD: 0 % (ref 0–2)
BUN SERPL-MCNC: 34 MG/DL (ref 6–23)
CALCIUM SERPL-MCNC: 8.5 MG/DL (ref 8.6–10.2)
CHLORIDE SERPL-SCNC: 104 MMOL/L (ref 98–107)
CO2 SERPL-SCNC: 25 MMOL/L (ref 22–29)
CREAT SERPL-MCNC: 0.4 MG/DL (ref 0.5–1)
EOSINOPHIL # BLD: 0.01 K/UL (ref 0.05–0.5)
EOSINOPHILS RELATIVE PERCENT: 0 % (ref 0–6)
ERYTHROCYTE [DISTWIDTH] IN BLOOD BY AUTOMATED COUNT: 19.3 % (ref 11.5–15)
GFR SERPL CREATININE-BSD FRML MDRD: >60 ML/MIN/1.73M2
GLUCOSE BLD-MCNC: 132 MG/DL (ref 74–99)
GLUCOSE BLD-MCNC: 145 MG/DL (ref 74–99)
GLUCOSE BLD-MCNC: 166 MG/DL (ref 74–99)
GLUCOSE BLD-MCNC: 180 MG/DL (ref 74–99)
GLUCOSE SERPL-MCNC: 198 MG/DL (ref 74–99)
HCT VFR BLD AUTO: 24.3 % (ref 34–48)
HCT VFR BLD AUTO: 26.9 % (ref 34–48)
HGB BLD-MCNC: 7.7 G/DL (ref 11.5–15.5)
HGB BLD-MCNC: 8.5 G/DL (ref 11.5–15.5)
IMM GRANULOCYTES # BLD AUTO: 0.38 K/UL (ref 0–0.58)
IMM GRANULOCYTES NFR BLD: 2 % (ref 0–5)
LYMPHOCYTES NFR BLD: 0.79 K/UL (ref 1.5–4)
LYMPHOCYTES RELATIVE PERCENT: 5 % (ref 20–42)
MCH RBC QN AUTO: 28.1 PG (ref 26–35)
MCHC RBC AUTO-ENTMCNC: 31.6 G/DL (ref 32–34.5)
MCV RBC AUTO: 88.8 FL (ref 80–99.9)
MICROORGANISM SPEC CULT: ABNORMAL
MICROORGANISM/AGENT SPEC: ABNORMAL
MONOCYTES NFR BLD: 0.44 K/UL (ref 0.1–0.95)
MONOCYTES NFR BLD: 3 % (ref 2–12)
NEUTROPHILS NFR BLD: 89 % (ref 43–80)
NEUTS SEG NFR BLD: 13.95 K/UL (ref 1.8–7.3)
PLATELET # BLD AUTO: 422 K/UL (ref 130–450)
PMV BLD AUTO: 9.9 FL (ref 7–12)
POTASSIUM SERPL-SCNC: 3.9 MMOL/L (ref 3.5–5)
RBC # BLD AUTO: 3.03 M/UL (ref 3.5–5.5)
REPORT STATUS: NORMAL
SODIUM SERPL-SCNC: 140 MMOL/L (ref 132–146)
SPECIMEN DESCRIPTION: ABNORMAL
WBC OTHER # BLD: 15.6 K/UL (ref 4.5–11.5)

## 2023-09-27 PROCEDURE — 85014 HEMATOCRIT: CPT

## 2023-09-27 PROCEDURE — 82962 GLUCOSE BLOOD TEST: CPT

## 2023-09-27 PROCEDURE — 6370000000 HC RX 637 (ALT 250 FOR IP): Performed by: CHIROPRACTOR

## 2023-09-27 PROCEDURE — 2580000003 HC RX 258: Performed by: STUDENT IN AN ORGANIZED HEALTH CARE EDUCATION/TRAINING PROGRAM

## 2023-09-27 PROCEDURE — 6370000000 HC RX 637 (ALT 250 FOR IP): Performed by: FAMILY MEDICINE

## 2023-09-27 PROCEDURE — 2700000000 HC OXYGEN THERAPY PER DAY

## 2023-09-27 PROCEDURE — 80048 BASIC METABOLIC PNL TOTAL CA: CPT

## 2023-09-27 PROCEDURE — 6370000000 HC RX 637 (ALT 250 FOR IP): Performed by: INTERNAL MEDICINE

## 2023-09-27 PROCEDURE — 6360000002 HC RX W HCPCS: Performed by: STUDENT IN AN ORGANIZED HEALTH CARE EDUCATION/TRAINING PROGRAM

## 2023-09-27 PROCEDURE — 2060000000 HC ICU INTERMEDIATE R&B

## 2023-09-27 PROCEDURE — 6370000000 HC RX 637 (ALT 250 FOR IP): Performed by: STUDENT IN AN ORGANIZED HEALTH CARE EDUCATION/TRAINING PROGRAM

## 2023-09-27 PROCEDURE — A4216 STERILE WATER/SALINE, 10 ML: HCPCS | Performed by: STUDENT IN AN ORGANIZED HEALTH CARE EDUCATION/TRAINING PROGRAM

## 2023-09-27 PROCEDURE — 6360000002 HC RX W HCPCS: Performed by: INTERNAL MEDICINE

## 2023-09-27 PROCEDURE — 2580000003 HC RX 258: Performed by: FAMILY MEDICINE

## 2023-09-27 PROCEDURE — 2500000003 HC RX 250 WO HCPCS: Performed by: STUDENT IN AN ORGANIZED HEALTH CARE EDUCATION/TRAINING PROGRAM

## 2023-09-27 PROCEDURE — 71045 X-RAY EXAM CHEST 1 VIEW: CPT

## 2023-09-27 PROCEDURE — 85025 COMPLETE CBC W/AUTO DIFF WBC: CPT

## 2023-09-27 PROCEDURE — 2580000003 HC RX 258: Performed by: INTERNAL MEDICINE

## 2023-09-27 PROCEDURE — C9113 INJ PANTOPRAZOLE SODIUM, VIA: HCPCS | Performed by: STUDENT IN AN ORGANIZED HEALTH CARE EDUCATION/TRAINING PROGRAM

## 2023-09-27 PROCEDURE — 99233 SBSQ HOSP IP/OBS HIGH 50: CPT | Performed by: INTERNAL MEDICINE

## 2023-09-27 PROCEDURE — 85018 HEMOGLOBIN: CPT

## 2023-09-27 RX ORDER — LINEZOLID 600 MG/1
600 TABLET, FILM COATED ORAL EVERY 12 HOURS SCHEDULED
Status: DISCONTINUED | OUTPATIENT
Start: 2023-09-27 | End: 2023-10-03 | Stop reason: SDUPTHER

## 2023-09-27 RX ORDER — LINEZOLID 2 MG/ML
600 INJECTION, SOLUTION INTRAVENOUS EVERY 12 HOURS
Status: DISCONTINUED | OUTPATIENT
Start: 2023-09-27 | End: 2023-09-27

## 2023-09-27 RX ADMIN — SODIUM CHLORIDE, PRESERVATIVE FREE 10 ML: 5 INJECTION INTRAVENOUS at 10:10

## 2023-09-27 RX ADMIN — METOPROLOL TARTRATE 50 MG: 50 TABLET ORAL at 09:49

## 2023-09-27 RX ADMIN — GUAIFENESIN 400 MG: 200 SOLUTION ORAL at 09:48

## 2023-09-27 RX ADMIN — SODIUM CHLORIDE, PRESERVATIVE FREE 10 ML: 5 INJECTION INTRAVENOUS at 21:03

## 2023-09-27 RX ADMIN — APIXABAN 5 MG: 5 TABLET, FILM COATED ORAL at 09:48

## 2023-09-27 RX ADMIN — SODIUM CHLORIDE, PRESERVATIVE FREE 40 MG: 5 INJECTION INTRAVENOUS at 09:49

## 2023-09-27 RX ADMIN — VALPROIC ACID 250 MG: 250 SOLUTION ORAL at 08:59

## 2023-09-27 RX ADMIN — METOPROLOL TARTRATE 50 MG: 50 TABLET ORAL at 21:03

## 2023-09-27 RX ADMIN — THIAMINE HYDROCHLORIDE 100 MG: 100 INJECTION, SOLUTION INTRAMUSCULAR; INTRAVENOUS at 09:49

## 2023-09-27 RX ADMIN — POLYETHYLENE GLYCOL 3350 17 G: 17 POWDER, FOR SOLUTION ORAL at 09:49

## 2023-09-27 RX ADMIN — MEMANTINE 10 MG: 10 TABLET ORAL at 09:52

## 2023-09-27 RX ADMIN — PETROLATUM: 420 OINTMENT TOPICAL at 21:03

## 2023-09-27 RX ADMIN — LINEZOLID 600 MG: 600 INJECTION, SOLUTION INTRAVENOUS at 11:27

## 2023-09-27 RX ADMIN — MEMANTINE 10 MG: 10 TABLET ORAL at 21:03

## 2023-09-27 RX ADMIN — ATORVASTATIN CALCIUM 10 MG: 10 TABLET, FILM COATED ORAL at 09:48

## 2023-09-27 RX ADMIN — SODIUM CHLORIDE, PRESERVATIVE FREE 40 MG: 5 INJECTION INTRAVENOUS at 21:03

## 2023-09-27 RX ADMIN — PIPERACILLIN AND TAZOBACTAM 3375 MG: 3; .375 INJECTION, POWDER, LYOPHILIZED, FOR SOLUTION INTRAVENOUS at 11:32

## 2023-09-27 RX ADMIN — PIPERACILLIN AND TAZOBACTAM 3375 MG: 3; .375 INJECTION, POWDER, LYOPHILIZED, FOR SOLUTION INTRAVENOUS at 21:25

## 2023-09-27 RX ADMIN — ASPIRIN 81 MG CHEWABLE TABLET 81 MG: 81 TABLET CHEWABLE at 09:48

## 2023-09-27 RX ADMIN — VALPROIC ACID 250 MG: 250 SOLUTION ORAL at 21:03

## 2023-09-27 RX ADMIN — PIPERACILLIN AND TAZOBACTAM 3375 MG: 3; .375 INJECTION, POWDER, LYOPHILIZED, FOR SOLUTION INTRAVENOUS at 04:45

## 2023-09-27 RX ADMIN — PETROLATUM: 420 OINTMENT TOPICAL at 11:04

## 2023-09-27 RX ADMIN — COLLAGENASE SANTYL: 250 OINTMENT TOPICAL at 09:34

## 2023-09-27 RX ADMIN — MIRTAZAPINE 30 MG: 15 TABLET, FILM COATED ORAL at 21:03

## 2023-09-27 RX ADMIN — LINEZOLID 600 MG: 600 TABLET, FILM COATED ORAL at 21:03

## 2023-09-27 RX ADMIN — VALPROIC ACID 250 MG: 250 SOLUTION ORAL at 14:33

## 2023-09-27 RX ADMIN — APIXABAN 5 MG: 5 TABLET, FILM COATED ORAL at 21:03

## 2023-09-27 ASSESSMENT — PAIN SCALES - PAIN ASSESSMENT IN ADVANCED DEMENTIA (PAINAD)
BREATHING: 0
TOTALSCORE: 0
NEGVOCALIZATION: 0
BREATHING: 0
NEGVOCALIZATION: 0
NEGVOCALIZATION: 0
BREATHING: 0
BREATHING: 0
NEGVOCALIZATION: 0
FACIALEXPRESSION: 0
FACIALEXPRESSION: 0
BODYLANGUAGE: 0
BODYLANGUAGE: 0
TOTALSCORE: 0
FACIALEXPRESSION: 0
FACIALEXPRESSION: 0
CONSOLABILITY: 0
NEGVOCALIZATION: 0
BODYLANGUAGE: 0
BODYLANGUAGE: 0
CONSOLABILITY: 0
BODYLANGUAGE: 0
BODYLANGUAGE: 0
CONSOLABILITY: 0
NEGVOCALIZATION: 0
BREATHING: 0
BODYLANGUAGE: 0
TOTALSCORE: 0
CONSOLABILITY: 0
TOTALSCORE: 0
BREATHING: 0
BREATHING: 0
FACIALEXPRESSION: 0
NEGVOCALIZATION: 0
CONSOLABILITY: 0
BREATHING: 0
CONSOLABILITY: 0
FACIALEXPRESSION: 0
NEGVOCALIZATION: 0
CONSOLABILITY: 0
TOTALSCORE: 0
BREATHING: 0
TOTALSCORE: 0
CONSOLABILITY: 0
NEGVOCALIZATION: 0
BODYLANGUAGE: 0
TOTALSCORE: 0
TOTALSCORE: 0
CONSOLABILITY: 0
NEGVOCALIZATION: 0
TOTALSCORE: 0
FACIALEXPRESSION: 0
FACIALEXPRESSION: 0
NEGVOCALIZATION: 0
BODYLANGUAGE: 0
BODYLANGUAGE: 0
TOTALSCORE: 0
CONSOLABILITY: 0
FACIALEXPRESSION: 0
BODYLANGUAGE: 0
CONSOLABILITY: 0
TOTALSCORE: 0

## 2023-09-27 ASSESSMENT — PAIN SCALES - GENERAL
PAINLEVEL_OUTOF10: 0

## 2023-09-27 NOTE — PLAN OF CARE
Problem: Safety - Adult  Goal: Free from fall injury  9/27/2023 1358 by Enriqueta Butler RN  Outcome: Progressing  9/27/2023 0439 by Raeann Gibson RN  Outcome: Progressing     Problem: Skin/Tissue Integrity  Goal: Absence of new skin breakdown  Description: 1. Monitor for areas of redness and/or skin breakdown  2. Assess vascular access sites hourly  3. Every 4-6 hours minimum:  Change oxygen saturation probe site  4. Every 4-6 hours:  If on nasal continuous positive airway pressure, respiratory therapy assess nares and determine need for appliance change or resting period.   9/27/2023 1358 by Enriqueta Butler RN  Outcome: Progressing  9/27/2023 0439 by Raeann Gibson RN  Outcome: Progressing     Problem: Neurosensory - Adult  Goal: Achieves stable or improved neurological status  Outcome: Progressing

## 2023-09-27 NOTE — PROGRESS NOTES
Department of Internal Medicine  Infectious Diseases   Progress  Note      C/C :  Empyema /  leukocytosis   Pt is awake , no distress  Denies fever, chills, SOB   Afebrile           Current Facility-Administered Medications   Medication Dose Route Frequency Provider Last Rate Last Admin    linezolid (ZYVOX) IVPB 600 mg  600 mg IntraVENous Q12H Tim King  mL/hr at 09/27/23 1127 600 mg at 09/27/23 1127    0.9 % sodium chloride infusion   IntraVENous PRN Castillo Marcus MD        metoprolol tartrate (LOPRESSOR) tablet 50 mg  50 mg Oral BID Cynthia Feldman MD   50 mg at 09/27/23 0949    polyethylene glycol (GLYCOLAX) packet 17 g  17 g Oral Daily Gustavo Cruz MD   17 g at 09/27/23 0949    guaiFENesin (ROBITUSSIN) 100 MG/5ML liquid 400 mg  400 mg Oral Q12H Gustavo Cruz MD   400 mg at 09/27/23 0948    thiamine (B-1) injection 100 mg  100 mg IntraVENous Daily Gustavo Cruz MD   100 mg at 09/27/23 0949    white petrolatum ointment   Topical BID Tiffany Alonso MD   Given at 09/27/23 1104    And    white petrolatum ointment   Topical TID PRN Tiffany Alonso MD        collagenase ointment   Topical Daily Tiffany Alonso MD   Given at 09/27/23 0934    valproic acid (DEPAKENE) 250 MG/5ML oral solution 250 mg  250 mg Oral TID iNta Gilliland MD   250 mg at 09/27/23 0859    pantoprazole (PROTONIX) 40 mg in sodium chloride (PF) 0.9 % 10 mL injection  40 mg IntraVENous Q12H Gustavo Cruz MD   40 mg at 09/27/23 0949    glucose chewable tablet 16 g  4 tablet Oral PRN Gustavo Cruz MD        dextrose bolus 10% 125 mL  125 mL IntraVENous PRN Gustavo Cruz MD        Or    dextrose bolus 10% 250 mL  250 mL IntraVENous PRN Gustavo Cruz MD        glucagon injection 1 mg  1 mg SubCUTAneous PRN Gustavo Cruz MD        dextrose 10 % infusion   IntraVENous Continuous PRN Gustavo Cruz MD        insulin lispro (HUMALOG) injection vial 0-4 Units  0-4 Units SubCUTAneous Monica Calvo MD

## 2023-09-28 ENCOUNTER — APPOINTMENT (OUTPATIENT)
Dept: GENERAL RADIOLOGY | Age: 83
End: 2023-09-28
Payer: MEDICARE

## 2023-09-28 LAB
ANION GAP SERPL CALCULATED.3IONS-SCNC: 7 MMOL/L (ref 7–16)
BASOPHILS # BLD: 0.03 K/UL (ref 0–0.2)
BASOPHILS NFR BLD: 0 % (ref 0–2)
BUN SERPL-MCNC: 31 MG/DL (ref 6–23)
CALCIUM SERPL-MCNC: 8.5 MG/DL (ref 8.6–10.2)
CHLORIDE SERPL-SCNC: 106 MMOL/L (ref 98–107)
CO2 SERPL-SCNC: 30 MMOL/L (ref 22–29)
CREAT SERPL-MCNC: 0.5 MG/DL (ref 0.5–1)
EOSINOPHIL # BLD: 0.29 K/UL (ref 0.05–0.5)
EOSINOPHILS RELATIVE PERCENT: 2 % (ref 0–6)
ERYTHROCYTE [DISTWIDTH] IN BLOOD BY AUTOMATED COUNT: 19.2 % (ref 11.5–15)
GFR SERPL CREATININE-BSD FRML MDRD: >60 ML/MIN/1.73M2
GLUCOSE BLD-MCNC: 108 MG/DL (ref 74–99)
GLUCOSE BLD-MCNC: 113 MG/DL (ref 74–99)
GLUCOSE BLD-MCNC: 118 MG/DL (ref 74–99)
GLUCOSE BLD-MCNC: 95 MG/DL (ref 74–99)
GLUCOSE SERPL-MCNC: 89 MG/DL (ref 74–99)
HCT VFR BLD AUTO: 26.7 % (ref 34–48)
HCT VFR BLD AUTO: 27.7 % (ref 34–48)
HGB BLD-MCNC: 8.3 G/DL (ref 11.5–15.5)
HGB BLD-MCNC: 8.4 G/DL (ref 11.5–15.5)
IMM GRANULOCYTES # BLD AUTO: 0.49 K/UL (ref 0–0.58)
IMM GRANULOCYTES NFR BLD: 4 % (ref 0–5)
LYMPHOCYTES NFR BLD: 2.14 K/UL (ref 1.5–4)
LYMPHOCYTES RELATIVE PERCENT: 17 % (ref 20–42)
MCH RBC QN AUTO: 28 PG (ref 26–35)
MCHC RBC AUTO-ENTMCNC: 31.1 G/DL (ref 32–34.5)
MCV RBC AUTO: 90.2 FL (ref 80–99.9)
MONOCYTES NFR BLD: 0.83 K/UL (ref 0.1–0.95)
MONOCYTES NFR BLD: 6 % (ref 2–12)
NEUTROPHILS NFR BLD: 71 % (ref 43–80)
NEUTS SEG NFR BLD: 9.18 K/UL (ref 1.8–7.3)
PLATELET # BLD AUTO: 451 K/UL (ref 130–450)
PMV BLD AUTO: 10 FL (ref 7–12)
POTASSIUM SERPL-SCNC: 3.6 MMOL/L (ref 3.5–5)
PROCALCITONIN SERPL-MCNC: 0.21 NG/ML (ref 0–0.08)
RBC # BLD AUTO: 2.96 M/UL (ref 3.5–5.5)
SODIUM SERPL-SCNC: 143 MMOL/L (ref 132–146)
WBC OTHER # BLD: 13 K/UL (ref 4.5–11.5)

## 2023-09-28 PROCEDURE — 32551 INSERTION OF CHEST TUBE: CPT

## 2023-09-28 PROCEDURE — 2580000003 HC RX 258: Performed by: INTERNAL MEDICINE

## 2023-09-28 PROCEDURE — 6370000000 HC RX 637 (ALT 250 FOR IP): Performed by: INTERNAL MEDICINE

## 2023-09-28 PROCEDURE — 85014 HEMATOCRIT: CPT

## 2023-09-28 PROCEDURE — 71045 X-RAY EXAM CHEST 1 VIEW: CPT

## 2023-09-28 PROCEDURE — 97530 THERAPEUTIC ACTIVITIES: CPT

## 2023-09-28 PROCEDURE — 85018 HEMOGLOBIN: CPT

## 2023-09-28 PROCEDURE — 99233 SBSQ HOSP IP/OBS HIGH 50: CPT | Performed by: INTERNAL MEDICINE

## 2023-09-28 PROCEDURE — 6360000002 HC RX W HCPCS: Performed by: INTERNAL MEDICINE

## 2023-09-28 PROCEDURE — 51798 US URINE CAPACITY MEASURE: CPT

## 2023-09-28 PROCEDURE — C9113 INJ PANTOPRAZOLE SODIUM, VIA: HCPCS | Performed by: STUDENT IN AN ORGANIZED HEALTH CARE EDUCATION/TRAINING PROGRAM

## 2023-09-28 PROCEDURE — 6360000002 HC RX W HCPCS: Performed by: STUDENT IN AN ORGANIZED HEALTH CARE EDUCATION/TRAINING PROGRAM

## 2023-09-28 PROCEDURE — 6370000000 HC RX 637 (ALT 250 FOR IP): Performed by: CHIROPRACTOR

## 2023-09-28 PROCEDURE — A4216 STERILE WATER/SALINE, 10 ML: HCPCS | Performed by: STUDENT IN AN ORGANIZED HEALTH CARE EDUCATION/TRAINING PROGRAM

## 2023-09-28 PROCEDURE — 2580000003 HC RX 258: Performed by: STUDENT IN AN ORGANIZED HEALTH CARE EDUCATION/TRAINING PROGRAM

## 2023-09-28 PROCEDURE — 6370000000 HC RX 637 (ALT 250 FOR IP): Performed by: FAMILY MEDICINE

## 2023-09-28 PROCEDURE — 97110 THERAPEUTIC EXERCISES: CPT

## 2023-09-28 PROCEDURE — 2500000003 HC RX 250 WO HCPCS: Performed by: STUDENT IN AN ORGANIZED HEALTH CARE EDUCATION/TRAINING PROGRAM

## 2023-09-28 PROCEDURE — 2580000003 HC RX 258: Performed by: FAMILY MEDICINE

## 2023-09-28 PROCEDURE — 85025 COMPLETE CBC W/AUTO DIFF WBC: CPT

## 2023-09-28 PROCEDURE — 80048 BASIC METABOLIC PNL TOTAL CA: CPT

## 2023-09-28 PROCEDURE — 36415 COLL VENOUS BLD VENIPUNCTURE: CPT

## 2023-09-28 PROCEDURE — 82962 GLUCOSE BLOOD TEST: CPT

## 2023-09-28 PROCEDURE — 84145 PROCALCITONIN (PCT): CPT

## 2023-09-28 PROCEDURE — 2060000000 HC ICU INTERMEDIATE R&B

## 2023-09-28 PROCEDURE — 2700000000 HC OXYGEN THERAPY PER DAY

## 2023-09-28 RX ADMIN — LINEZOLID 600 MG: 600 TABLET, FILM COATED ORAL at 09:23

## 2023-09-28 RX ADMIN — GUAIFENESIN 400 MG: 200 SOLUTION ORAL at 22:06

## 2023-09-28 RX ADMIN — PETROLATUM: 420 OINTMENT TOPICAL at 09:25

## 2023-09-28 RX ADMIN — VALPROIC ACID 250 MG: 250 SOLUTION ORAL at 09:21

## 2023-09-28 RX ADMIN — VALPROIC ACID 250 MG: 250 SOLUTION ORAL at 21:57

## 2023-09-28 RX ADMIN — PIPERACILLIN AND TAZOBACTAM 3375 MG: 3; .375 INJECTION, POWDER, LYOPHILIZED, FOR SOLUTION INTRAVENOUS at 22:21

## 2023-09-28 RX ADMIN — THIAMINE HYDROCHLORIDE 100 MG: 100 INJECTION, SOLUTION INTRAMUSCULAR; INTRAVENOUS at 09:23

## 2023-09-28 RX ADMIN — SODIUM CHLORIDE, PRESERVATIVE FREE 40 MG: 5 INJECTION INTRAVENOUS at 09:23

## 2023-09-28 RX ADMIN — APIXABAN 5 MG: 5 TABLET, FILM COATED ORAL at 09:22

## 2023-09-28 RX ADMIN — MEMANTINE 10 MG: 10 TABLET ORAL at 21:57

## 2023-09-28 RX ADMIN — PIPERACILLIN AND TAZOBACTAM 3375 MG: 3; .375 INJECTION, POWDER, LYOPHILIZED, FOR SOLUTION INTRAVENOUS at 11:46

## 2023-09-28 RX ADMIN — GUAIFENESIN 400 MG: 200 SOLUTION ORAL at 00:00

## 2023-09-28 RX ADMIN — PIPERACILLIN AND TAZOBACTAM 3375 MG: 3; .375 INJECTION, POWDER, LYOPHILIZED, FOR SOLUTION INTRAVENOUS at 04:30

## 2023-09-28 RX ADMIN — SODIUM CHLORIDE, PRESERVATIVE FREE 10 ML: 5 INJECTION INTRAVENOUS at 22:24

## 2023-09-28 RX ADMIN — MEMANTINE 10 MG: 10 TABLET ORAL at 09:22

## 2023-09-28 RX ADMIN — VALPROIC ACID 250 MG: 250 SOLUTION ORAL at 18:45

## 2023-09-28 RX ADMIN — COLLAGENASE SANTYL: 250 OINTMENT TOPICAL at 09:23

## 2023-09-28 RX ADMIN — METOPROLOL TARTRATE 50 MG: 50 TABLET ORAL at 09:22

## 2023-09-28 RX ADMIN — ASPIRIN 81 MG CHEWABLE TABLET 81 MG: 81 TABLET CHEWABLE at 09:22

## 2023-09-28 RX ADMIN — APIXABAN 5 MG: 5 TABLET, FILM COATED ORAL at 21:57

## 2023-09-28 RX ADMIN — LINEZOLID 600 MG: 600 TABLET, FILM COATED ORAL at 21:57

## 2023-09-28 RX ADMIN — SODIUM CHLORIDE, PRESERVATIVE FREE 40 MG: 5 INJECTION INTRAVENOUS at 21:57

## 2023-09-28 RX ADMIN — SODIUM CHLORIDE, PRESERVATIVE FREE 10 ML: 5 INJECTION INTRAVENOUS at 09:25

## 2023-09-28 RX ADMIN — ATORVASTATIN CALCIUM 10 MG: 10 TABLET, FILM COATED ORAL at 09:23

## 2023-09-28 RX ADMIN — METOPROLOL TARTRATE 50 MG: 50 TABLET ORAL at 21:57

## 2023-09-28 RX ADMIN — PETROLATUM: 420 OINTMENT TOPICAL at 22:14

## 2023-09-28 RX ADMIN — MIRTAZAPINE 30 MG: 15 TABLET, FILM COATED ORAL at 21:57

## 2023-09-28 RX ADMIN — GUAIFENESIN 400 MG: 200 SOLUTION ORAL at 11:26

## 2023-09-28 ASSESSMENT — PAIN SCALES - PAIN ASSESSMENT IN ADVANCED DEMENTIA (PAINAD)
NEGVOCALIZATION: 0
NEGVOCALIZATION: 0
TOTALSCORE: 0
FACIALEXPRESSION: 0
TOTALSCORE: 0
CONSOLABILITY: 0
FACIALEXPRESSION: 0
FACIALEXPRESSION: 0
BREATHING: 0
BODYLANGUAGE: 0
TOTALSCORE: 0
TOTALSCORE: 0
CONSOLABILITY: 0
BODYLANGUAGE: 0
TOTALSCORE: 0
NEGVOCALIZATION: 0
BODYLANGUAGE: 0
TOTALSCORE: 0
CONSOLABILITY: 0
CONSOLABILITY: 0
TOTALSCORE: 0
FACIALEXPRESSION: 0
BREATHING: 0
NEGVOCALIZATION: 0
BODYLANGUAGE: 0
CONSOLABILITY: 0
CONSOLABILITY: 0
FACIALEXPRESSION: 0
BREATHING: 0
NEGVOCALIZATION: 0
FACIALEXPRESSION: 0
BODYLANGUAGE: 0
NEGVOCALIZATION: 0
BREATHING: 0
FACIALEXPRESSION: 0
FACIALEXPRESSION: 0
NEGVOCALIZATION: 0
CONSOLABILITY: 0
BREATHING: 0
BODYLANGUAGE: 0
NEGVOCALIZATION: 0
BREATHING: 0
BREATHING: 0
CONSOLABILITY: 0
BREATHING: 0
BODYLANGUAGE: 0
BODYLANGUAGE: 0
TOTALSCORE: 0

## 2023-09-28 ASSESSMENT — PAIN SCALES - WONG BAKER: WONGBAKER_NUMERICALRESPONSE: 0

## 2023-09-28 ASSESSMENT — PAIN SCALES - GENERAL
PAINLEVEL_OUTOF10: 0

## 2023-09-28 NOTE — PROGRESS NOTES
Comprehensive Nutrition Assessment    Type and Reason for Visit:  Reassess    Nutrition Recommendations/Plan:   Continue NPO. Highly Recommend to Modify Current EN to appropriately meet estimated needs & monitor (Current EN highly exceeds estimated Protein/Fluid Needs at this time). TF Recommendations:  Diabetic (Glucerna 1.5) @ 45ml/hr (Goal) Continuous x 24hrs/d= 1080ml TV, 1620kcal, 89gm Pro, 820ml free water. Flush per critical care mgmt; however if needed, recommend 150ml flush q 6h= 1420ml total water (TF+Flush). Malnutrition Assessment:  Malnutrition Status:  Insufficient data (09/21/23 1301)    Context:  Acute Illness     Findings of the 6 clinical characteristics of malnutrition:  Energy Intake:  Mild decrease in energy intake (Comment) (since adm)  Weight Loss:  Unable to assess     Body Fat Loss:  Unable to assess     Muscle Mass Loss:  Unable to assess    Fluid Accumulation:  Unable to assess     Strength:  Not Performed    Nutrition Assessment:    Pt adm from SNF w/ SOB/hypoxia x ~1d pta. PMHx Dementia, Colitis, DM. Adm w/ AHRF, Septic Shock, Rt Pleural Effusion, suspected Empyema, s/p Rt Pigtail Insert and Corpak 9/20. Tolerating EN at goal thus far. Remains at risk d/t ongoing NPO status w/ need for EN support 2/2 Dementia/Dysphagia; also w/ increased needs for healing of multiple wounds. Will provide EN rec's to meet current estimated needs and monitor.     Nutrition Related Findings:    AMSx2, upper dentures, Abd WDL, Hypo BS, +Corpak w/ TF, +1 edema, +I/O's, mild hyperglycemia Wound Type: Multiple, Deep Tissue Injury, Pressure Injury, Stage III, Partial Thickness, Open Wounds       Current Nutrition Intake & Therapies:    Average Meal Intake: NPO  Average Supplements Intake: NPO  Diet NPO  ADULT TUBE FEEDING; Nasoenteric; Peptide Based High Protein; Continuous; 10; Yes; 10; Q 4 hours; 55; 100; Q 4 hours; Protein; BID  Current Tube Feeding (TF) Orders:  Feeding Route: Nasoenteric  Formula: Peptide Based High Protein  Schedule: Continuous  Feeding Regimen: 55ml/hr; 1320ml TV  Additives/Modulars: Protein (BID)  Water Flushes: 100ml q 4h= 600ml  Current TF & Flush Orders Provides: Same at Goal  Goal TF & Flush Orders Provides: 1320kcal, 116gm Pro, (1528kcal, 168gm Pro w/ Pro Mod BID), 1104ml freewater, 1704ml total water w/ flush    Anthropometric Measures:  Height: 5' 3\" (160 cm)  Ideal Body Weight (IBW): 115 lbs (52 kg)    Admission Body Weight: 140 lb (63.5 kg) (bed 9/19)  Current Body Weight: 140 lb (63.5 kg) (adm wt bed 9/19 used as CBW remains slightly elevated since adm),   IBW. Weight Source: Bed Scale  Current BMI (kg/m2): 24.8  Usual Body Weight:  (UTO UBW 2/2 poor EMR wt hx pta)     Weight Adjustment For: No Adjustment                 BMI Categories: Normal Weight (BMI 22.0 to 24.9) age over 72 (per adm wt)    Estimated Daily Nutrient Needs:  Energy Requirements Based On: Formula  Weight Used for Energy Requirements: Current  Energy (kcal/day): MSJx1.3SF per CBW=   Weight Used for Protein Requirements: Current  Protein (g/day): 1.3-1.5gm/kg CBW= 80-95  Method Used for Fluid Requirements: Other (Comment)  Fluid (ml/day):  or per critical care mgmt    Nutrition Diagnosis:   Inadequate oral intake related to cognitive or neurological impairment as evidenced by NPO or clear liquid status due to medical condition, nutrition support - enteral nutrition, poor intake prior to admission    Nutrition Interventions:   Food and/or Nutrient Delivery: Continue NPO, Modify Tube Feeding  Nutrition Education/Counseling: Education not indicated  Coordination of Nutrition Care: Continue to monitor while inpatient       Goals:  Previous Goal Met: Progressing toward Goal(s)  Goals:  Tolerate nutrition support at goal rate, by next RD assessment       Nutrition Monitoring and Evaluation:   Behavioral-Environmental Outcomes: None Identified  Food/Nutrient Intake Outcomes: Diet

## 2023-09-28 NOTE — PROGRESS NOTES
[address] [facility] PROGRESS NOTE    Patient: Nelida Gutierrez  MRN: [id number]  : 1940    Encounter Date: 2023  Encounter Time: 4:31 PM     Date of Admission: .2023 11:02 AM    Consulting Physician:  Primary Care Physician:     Tommy Maria III, DO     [phone]    Reason for Consultation: Pleural Effusion     Problem List:  Patient Active Problem List   Diagnosis    Essential hypertension    Acute colitis    Nausea and vomiting    Depression    Dementia ([address])    Cholelithiases    HTN (hypertension)    Leukocytosis    Shingles    Biliary colic    Sepsis ([address])    New onset atrial fibrillation ([address])    Pleural effusion on right    Empyema of lung ([address])    Pleural effusion     SUBJECTIVE: Patient seen and examined. Overnight the patient had no shortness of breath, cough, increased work of breathing. Afebrile  FiO2 down to 2-3 L  Chest tube output overnight 20 cc      HOME MEDICATIONS:  Prior to Admission medications    Medication Sig Start Date End Date Taking?  Authorizing Provider   acetaminophen (TYLENOL) 325 MG tablet Take 2 tablets by mouth every 6 hours as needed for Pain or Fever   Yes Historical Provider, MD   ferrous sulfate (IRON 325) 325 (65 Fe) MG tablet Take 1 tablet by mouth daily (with breakfast)   Yes Historical Provider, MD   folic acid (FOLVITE) 1 MG tablet Take 1 tablet by mouth daily   Yes Historical Provider, MD   memantine (NAMENDA) 10 MG tablet Take 1 tablet by mouth 2 times daily   Yes Historical Provider, MD   vitamin C (ASCORBIC ACID) 500 MG tablet Take 2 tablets by mouth daily   Yes Historical Provider, MD   divalproex (DEPAKOTE) 250 MG DR tablet Take 1 tablet by mouth 3 times daily 23   Mitesh Real DO   vitamin D (CHOLECALCIFEROL) 50 MCG (2000) TABS tablet Take 1 tablet by mouth daily 23   Mitesh Real DO   apixaban (ELIQUIS) 5 MG TABS tablet Take 1 tablet by mouth 2 times daily 23 09/28/2023    NEUTROABS 9.18 (H) 09/28/2023    LYMPHSABS 2.14 09/28/2023    MONOSABS 0.83 09/28/2023    EOSABS 0.29 09/28/2023    BASOSABS 0.03 09/28/2023       Recent Labs     09/28/23  0441 09/27/23  1614 09/27/23  0426 09/26/23  0438   WBC 13.0*  --  15.6* 12.5*   HGB 8.3* 7.7* 8.5* 7.5*   HCT 26.7* 24.3* 26.9* 23.6*   MCV 90.2  --  88.8 86.8   *  --  422 368       BMP:   Recent Labs     09/26/23  0438 09/27/23  0426 09/28/23  0441    140 143   K 4.3 3.9 3.6    104 106   CO2 26 25 30*   BUN 37* 34* 31*   CREATININE 0.6 0.4* 0.5       MG:   Lab Results   Component Value Date/Time    MG 2.2 09/24/2023 04:13 AM     Ca/Phos:   Lab Results   Component Value Date    CALCIUM 8.5 (L) 09/28/2023    PHOS 2.0 (L) 09/24/2023     Amylase: No results found for: \"AMYLASE\"  Lipase:   Lab Results   Component Value Date    LIPASE 33 09/10/2021     LIVER PROFILE:   No results for input(s): \"AST\", \"ALT\", \"LIPASE\", \"AMYLASE\", \"ALB\", \"BILIDIR\", \"BILITOT\", \"ALKPHOS\" in the last 72 hours. PT/INR: No results for input(s): \"PROTIME\", \"INR\" in the last 72 hours. APTT: No results for input(s): \"APTT\" in the last 72 hours.     Cardiac Enzymes:  Lab Results   Component Value Date    TROPONINI <0.01 01/27/2020       Hgb A1C:   Lab Results   Component Value Date    LABA1C 5.6 09/06/2021     No results found for: \"EAG\"  MARILOU: No results found for: \"MARILOU\"  ESR:   Lab Results   Component Value Date    SEDRATE 25 (H) 07/04/2023     CRP:   Lab Results   Component Value Date    CRP 33.8 (H) 07/04/2023     D Dimer:   Lab Results   Component Value Date    DDIMER 735 (H) 09/18/2023     Folate and B12: No results found for: \"[id number]\", No results found for: \"FOLATE\"    Lactic Acid:   Lab Results   Component Value Date    LACTA 4.0 ([facility]) 09/18/2023     Ammonia:   Cortisol:  Thyroid Studies:  Lab Results   Component Value Date    TSH 5.07 (H) 09/19/2023     X-rays today, unchanged  ASSESSMENT:  Severe Sepsis, status post septic Shock  Right Pleural Effusion, R/O Empyema; culture with VRE & gram-negative rods x2  Leukocytosis  H/O Pulmonary Embolism   H/O Atrial Fibrillation     PLAN:  Zosyn and Zyvox by ID  Wean O2, IS  Dornase alpha and tPA per MIST II protocol completed  Pigtail catheter to be removed if output less than 100 cc/day overnight.    To repeat films in am    .    Alan Mcghee MD,

## 2023-09-28 NOTE — PROGRESS NOTES
placement of right-sided pigtail chest tube, with significant   decrease in the right pleural effusion. 2. Nonspecific pulmonary opacities in the right mid and lower lung, which may   represent atelectasis or pneumonia. 3. No pneumothorax is evident by plain radiography. IR GUIDED PERC PLEURAL DRAIN W CATH INSERT   Final Result   1. Successful US guided Right pleural drain catheter placement. CTA PULMONARY W CONTRAST   Final Result   1. No evidence of pulmonary embolism. 2. Extremely large right pleural effusion with partially loculated anterior   hydropneumothorax component, possibly related to superimposed empyema at the   anterior right base and extending to the anterior inferior right pulmonary   hilum. 3. Complete collapse of the right lung with mild leftward shift of the cardio   mediastinum secondary to #2 above. RECOMMENDATIONS:   Careful clinical correlation and follow up recommended. CT HEAD WO CONTRAST   Final Result   1. No evidence of acute intracranial hemorrhage or mass effect. 2.  Brain parenchymal volume loss with presumed sequela of chronic small   vessel ischemic disease and prior ischemia. XR CHEST PORTABLE   Final Result   Large right pleural effusion resulting in mild mediastinal deviation to the   left. Resident's Assessment and Plan     Assessment and Plan:    Neurology:   -H/O dementia; likely vascular, CT head on 9/18/23 showed no evidence of acute intracranial hemorrhage or mass effect, brain parenchymal volume loss with 2/2 chronic small vessel ischemic disease and prior ischemia. On Memantine and mirtazapine; plan to continue   Continue depakene                                                                  2. Cardiovascular:   -Shock, likely septic, not on pressors. Likely focus being empyema at anterior right base.  (Resolving)  -Pitting edema;  d/t 3rd spacing   Monitor hemodynamic stability  Watch for hypotension  Continue seen and examined the patient and that the key elements of the encounter were performed by me (> 85 % time). The medications & laboratory data was discussed and adjusted where necessary. The radiographic images were reviewed or with radiologist or consultant if felt dis-concordant with the exam or history. The above findings were corroborated, plans confirmed and changes made if needed. Family is updated at the bedside as available. Key issues of the case were discussed among consultants. Continue current care. Awaiting transfer.           Thu Gutierrez DO

## 2023-09-28 NOTE — CARE COORDINATION
Care Coordination: LOS 10 day. Per pulm, Pigtail cath to be removed when output <100cc x 2 day. Pt has orders to transfer. Long term care at [address] [facility]. Requesting therapy evals prior to discharge. Therapy has been following. Per POA, plan is to return to facility. DARRION and transport envelope completed.     Electronically signed by Margy Diallo RN on 9/28/2023 at 11:36 AM

## 2023-09-28 NOTE — PROGRESS NOTES
Physical Therapy    Physical Therapy Treatment Note    Name: Jesse Arnett  : 1940  MRN: [id number]      Date of Service: 2023    Evaluating PT:  Pieter Sahu, PT, DPT  [id number]     Room #:  1436/5424-W  Diagnosis:  Hydropneumothorax [J94.8]  Empyema of lung ([address]) [J86.9]  Septicemia ([address]) [A41.9]  Empyema ([address]) [J86.9]  Pleural effusion on right [J90]  PMHx/PSHx:  A-fib, Dementia, Iron Deficiency Anemia  Procedure/Surgery:  Chest Tube Placement (23)  Precautions:  Falls, O2, NGT, R chest tube, R-sided weakness  Equipment Needs:  TBD    SUBJECTIVE:    Unable to gather information from pt about living situation, so information gathered from case management note. Pt is from [facility], with a history of dementia. OBJECTIVE:   Initial Evaluation  Date: 23 Treatment  23 Short Term/ Long Term   Goals   AM-PAC 6 Clicks 56     Was pt agreeable to Eval/treatment? Yes Yes    Does pt have pain?  No No    Bed Mobility  Rolling: MaxAx2  Supine to sit: MaxAx2  Sit to supine: MaxAx2  Scooting: MaxAx2 Rolling: MaxAx2  Supine to sit: MaxAx2  Sit to supine: MaxAx2  Scooting: MaxAx2 Rolling: ModA  Supine to sit: ModA  Sit to supine: ModA  Scooting: ModA   Transfers Sit to stand: NT  Stand to sit: NT  Stand pivot: NT Sit to stand: NT  Stand to sit: NT  Stand pivot: NT Sit to stand: MaxA  Stand to sit: MaxA  Stand pivot: MaxA   Ambulation    NT NT >2 feet with AAD MaxA   Stair negotiation: ascended and descended  NT NT NA   ROM RUE: Moderately restricted in all planes passively  LUE: WFLs passively  RLE: Moderately restricted in all planes passively  LLE: WFLs passively RLE: Moderately restricted in all planes passively  LLE: WFLs passively    Strength RUE: unable to follow commands  LUE:  strength 2/5, all others unable to follow commands to achieve a measurement  RLE: unable to follow commands  LLE: Knee extension 2/5, all others unable to follow commands to achieve a measurement coherent. While sitting EOB, a L lateral lean was demonstrated in which therapist assistance was required to keep upright. L knee extension improved by 1 level today on an MMT scale from a 2/5 to a 3/5. Pt left supine in bed with call button in reach, lines attached, and needs met. Nursing notified post-session about pt's tolerance to therapy. Treatment:  Patient practiced and was instructed in the following treatment:    Bed mobility training - pt given verbal and tactile cues to facilitate proper sequencing and safety during rolling and supine>sit as well as provided with physical assistance to complete task   Skilled positioning - Pt placed in the chair position with pillows utilized to facilitate upright posture, joint and skin integrity, and interaction with environment. Sitting EOB for >10 minutes for upright tolerance, postural awareness and BLE ROM    Prognosis is fair for reaching above PT goals. Patient and or family understand(s) diagnosis, prognosis, and plan of care. PHYSICAL THERAPY PLAN OF CARE:    PT POC is established based on physician order and patient diagnosis     Referring provider/PT Order:  DOMINIQUE Ervin - CNP / PT eval and treat   Diagnosis:  Hydropneumothorax [J94.8]  Empyema of lung ([address]) [J86.9]  Septicemia ([address]) [A41.9]  Empyema ([address]) [J86.9]  Pleural effusion on right [J90]  Specific instructions for next treatment: Progress pt in bed mobility & transfer training as appropriate.     Current Treatment Recommendations:     [x] Strengthening to improve independence with functional mobility   [x] ROM to improve independence with functional mobility   [x] Balance Training to improve static/dynamic balance and to reduce fall risk  [x] Endurance Training to improve activity tolerance during functional mobility   [x] Transfer Training to improve safety and independence with all functional transfers   [x] Gait Training to improve gait mechanics, endurance and asses need

## 2023-09-28 NOTE — PROGRESS NOTES
[address] [address], [place]       Date:2023                                                               Patient Name: Radha Lea  MRN: [id number]  : 1940  Room: [address] [place] [address]-A    Evaluating OT: Aliyah Dinero OTD,  OTR/L; [id number]    Referring Provider: DOMINIQUE Jarrell CNP   Specific Provider Orders/Date: OT eval and treat (23)       Diagnosis: Hydropneumothorax [J94.8]  Empyema of lung ([address]) [J86.9]  Septicemia ([address]) [A41.9]  Empyema ([address]) [J86.9]  Pleural effusion on right [J90]     Reason for admission: Pt admitted with pleural effusion, hypotension. Surgery/Procedures: None this admission     Pertinent Medical History:    History reviewed. No pertinent past medical history. *Precautions:  Fall Risk, Contact isolation, R ashley, R neglect, R inattention, cognition, incontinent, hx dementia/CVA, R chest tube, NGT    Assessment of current deficits   [x] Functional mobility  [x]ADLs  [x] Strength               [x]Cognition   [x] Functional transfers   [x] IADLs         [x] Safety Awareness   [x]Endurance   [x] Fine Coordination        [x] ROM     [x] Vision/perception   []Sensation    [x]Gross Motor Coordination [x] Balance   [x] Delirium                  []Motor Control     [x] Communication    OT PLAN OF CARE   OT POC based on physician orders, patient diagnosis and results of clinical assessment.        Frequency/Duration: 1-3 days/wk for 1-2 weeks PRN    Specific OT Treatment Interventions to include:   * Instruction/training on adapted ADL techniques and AE recommendations to increase functional independence within precautions       * Training on energy conservation strategies, correct breathing pattern and techniques to improve independence/tolerance for self-care routine  * Functional transfer/mobility training/DME recommendations for increased independence, with ADLs  5             Severe disability;bedridden/incontinent   6               Score:   5    Recommended Adaptive Equipment: TBD     Home Living: Pt admitted from [address] [place] [address]. Bathroom setup: NA  Equipment owned: NA    Prior Level of Function: Assist with ADLs; Dep with IADLs. WC vs FWW for SPT/ for functional mobility. Driving: No  Occupation: None stated    Pain Level: pt c/o 0/10 pain this session    Cognition: A&O: 0-1/4- looks to name; Follows 1 step commands with mod cues   Memory: F   Comprehension: F   Problem solving: P+   Judgement/safety: P+               Communication skills: Limited d/t neglect/cognition. Vision: Pt tracks faces, unable to track moving object- fixed stare               Glasses: ?                                                   Hearing: Appears WFL     RASS: 0  CAM-ICU: (NT) Delirium    UE Assessment:   Hand Dominance: Right [x]  Left []     ROM Strength STM goal: PRN   RUE  PROM 0-45 0/5  : Absent Increase overall strength for participation in ADLs. LUE PROM 0-70 2+/5  : Fair- Increase overall strength for participation in ADLs. Sensation: No c/o numbness/tingling in extremities. Tone: WNL  Edema: Unremarkable     Functional Assessment:  AM-PAC Daily Activity Raw Score:    Initial Eval Status  Date: 23 Treatment Status  Date: 23 STGs = LTGs  Time frame: 7-14 days   Feeding NPO/NT NPO                     NA     Grooming Max A Max A                     Min A        UB dressing/bathing Max A Dep                     Min A       LB dressing/bathing Dep Dep                     Mod A        Toileting Dep Dep                     Mod A     Bed Mobility  Supine to sit: Max A    Sit to supine:   Max A Supine to sit:    Max A x2    Sit to supine:   Max A x2                     Min A     Functional Transfers Sit to stand:   NT    Stand to sit:   NT    Stand Pivot:   NT Sit to stand:   NT    Stand to sit:   NT    Stand Pivot:   NT

## 2023-09-29 ENCOUNTER — APPOINTMENT (OUTPATIENT)
Dept: GENERAL RADIOLOGY | Age: 83
End: 2023-09-29
Payer: MEDICARE

## 2023-09-29 LAB
ANION GAP SERPL CALCULATED.3IONS-SCNC: 10 MMOL/L (ref 7–16)
ATYPICAL LYMPHOCYTE ABSOLUTE COUNT: 0.13 K/UL (ref 0–0.46)
ATYPICAL LYMPHOCYTES: 1 % (ref 0–4)
BASOPHILS # BLD: 0 K/UL (ref 0–0.2)
BASOPHILS NFR BLD: 0 % (ref 0–2)
BUN SERPL-MCNC: 27 MG/DL (ref 6–23)
CALCIUM SERPL-MCNC: 8.2 MG/DL (ref 8.6–10.2)
CHLORIDE SERPL-SCNC: 106 MMOL/L (ref 98–107)
CO2 SERPL-SCNC: 29 MMOL/L (ref 22–29)
CREAT SERPL-MCNC: 0.5 MG/DL (ref 0.5–1)
EOSINOPHIL # BLD: 0.26 K/UL (ref 0.05–0.5)
EOSINOPHILS RELATIVE PERCENT: 2 % (ref 0–6)
ERYTHROCYTE [DISTWIDTH] IN BLOOD BY AUTOMATED COUNT: 19.9 % (ref 11.5–15)
GFR SERPL CREATININE-BSD FRML MDRD: >60 ML/MIN/1.73M2
GLUCOSE BLD-MCNC: 110 MG/DL (ref 74–99)
GLUCOSE BLD-MCNC: 116 MG/DL (ref 74–99)
GLUCOSE BLD-MCNC: 117 MG/DL (ref 74–99)
GLUCOSE BLD-MCNC: 134 MG/DL (ref 74–99)
GLUCOSE SERPL-MCNC: 112 MG/DL (ref 74–99)
HCT VFR BLD AUTO: 28.2 % (ref 34–48)
HCT VFR BLD AUTO: 28.3 % (ref 34–48)
HGB BLD-MCNC: 8.4 G/DL (ref 11.5–15.5)
HGB BLD-MCNC: 8.6 G/DL (ref 11.5–15.5)
LYMPHOCYTES NFR BLD: 1.57 K/UL (ref 1.5–4)
LYMPHOCYTES RELATIVE PERCENT: 12 % (ref 20–42)
MCH RBC QN AUTO: 28.2 PG (ref 26–35)
MCHC RBC AUTO-ENTMCNC: 30.5 G/DL (ref 32–34.5)
MCV RBC AUTO: 92.5 FL (ref 80–99.9)
METAMYELOCYTES ABSOLUTE COUNT: 0.13 K/UL (ref 0–0.12)
METAMYELOCYTES: 1 % (ref 0–1)
MICROORGANISM SPEC CULT: ABNORMAL
MICROORGANISM SPEC CULT: ABNORMAL
MICROORGANISM SPEC CULT: NORMAL
MICROORGANISM SPEC CULT: NORMAL
MICROORGANISM/AGENT SPEC: ABNORMAL
MONOCYTES NFR BLD: 0.13 K/UL (ref 0.1–0.95)
MONOCYTES NFR BLD: 1 % (ref 2–12)
MYELOCYTES ABSOLUTE COUNT: 0.13 K/UL
MYELOCYTES: 1 %
NEUTROPHILS NFR BLD: 82 % (ref 43–80)
NEUTS SEG NFR BLD: 10.85 K/UL (ref 1.8–7.3)
NUCLEATED RED BLOOD CELLS: 1 PER 100 WBC
PLATELET # BLD AUTO: 438 K/UL (ref 130–450)
PMV BLD AUTO: 9.8 FL (ref 7–12)
POTASSIUM SERPL-SCNC: 3.7 MMOL/L (ref 3.5–5)
RBC # BLD AUTO: 3.05 M/UL (ref 3.5–5.5)
RBC # BLD: ABNORMAL 10*6/UL
SERVICE CMNT-IMP: NORMAL
SERVICE CMNT-IMP: NORMAL
SODIUM SERPL-SCNC: 145 MMOL/L (ref 132–146)
SPECIMEN DESCRIPTION: ABNORMAL
SPECIMEN DESCRIPTION: NORMAL
SPECIMEN DESCRIPTION: NORMAL
WBC OTHER # BLD: 13.2 K/UL (ref 4.5–11.5)

## 2023-09-29 PROCEDURE — 6370000000 HC RX 637 (ALT 250 FOR IP): Performed by: INTERNAL MEDICINE

## 2023-09-29 PROCEDURE — 80048 BASIC METABOLIC PNL TOTAL CA: CPT

## 2023-09-29 PROCEDURE — 71045 X-RAY EXAM CHEST 1 VIEW: CPT

## 2023-09-29 PROCEDURE — 6370000000 HC RX 637 (ALT 250 FOR IP): Performed by: FAMILY MEDICINE

## 2023-09-29 PROCEDURE — 6360000002 HC RX W HCPCS: Performed by: STUDENT IN AN ORGANIZED HEALTH CARE EDUCATION/TRAINING PROGRAM

## 2023-09-29 PROCEDURE — 2500000003 HC RX 250 WO HCPCS: Performed by: STUDENT IN AN ORGANIZED HEALTH CARE EDUCATION/TRAINING PROGRAM

## 2023-09-29 PROCEDURE — 2580000003 HC RX 258: Performed by: FAMILY MEDICINE

## 2023-09-29 PROCEDURE — A4216 STERILE WATER/SALINE, 10 ML: HCPCS | Performed by: STUDENT IN AN ORGANIZED HEALTH CARE EDUCATION/TRAINING PROGRAM

## 2023-09-29 PROCEDURE — 2580000003 HC RX 258: Performed by: STUDENT IN AN ORGANIZED HEALTH CARE EDUCATION/TRAINING PROGRAM

## 2023-09-29 PROCEDURE — 2580000003 HC RX 258: Performed by: INTERNAL MEDICINE

## 2023-09-29 PROCEDURE — 97530 THERAPEUTIC ACTIVITIES: CPT

## 2023-09-29 PROCEDURE — 36415 COLL VENOUS BLD VENIPUNCTURE: CPT

## 2023-09-29 PROCEDURE — 82962 GLUCOSE BLOOD TEST: CPT

## 2023-09-29 PROCEDURE — C9113 INJ PANTOPRAZOLE SODIUM, VIA: HCPCS | Performed by: STUDENT IN AN ORGANIZED HEALTH CARE EDUCATION/TRAINING PROGRAM

## 2023-09-29 PROCEDURE — 2700000000 HC OXYGEN THERAPY PER DAY

## 2023-09-29 PROCEDURE — 85018 HEMOGLOBIN: CPT

## 2023-09-29 PROCEDURE — 6370000000 HC RX 637 (ALT 250 FOR IP): Performed by: CHIROPRACTOR

## 2023-09-29 PROCEDURE — 85025 COMPLETE CBC W/AUTO DIFF WBC: CPT

## 2023-09-29 PROCEDURE — 85014 HEMATOCRIT: CPT

## 2023-09-29 PROCEDURE — 6360000002 HC RX W HCPCS: Performed by: INTERNAL MEDICINE

## 2023-09-29 PROCEDURE — 2060000000 HC ICU INTERMEDIATE R&B

## 2023-09-29 PROCEDURE — 6370000000 HC RX 637 (ALT 250 FOR IP): Performed by: STUDENT IN AN ORGANIZED HEALTH CARE EDUCATION/TRAINING PROGRAM

## 2023-09-29 RX ADMIN — POLYETHYLENE GLYCOL 3350 17 G: 17 POWDER, FOR SOLUTION ORAL at 11:09

## 2023-09-29 RX ADMIN — METOPROLOL TARTRATE 50 MG: 50 TABLET ORAL at 20:21

## 2023-09-29 RX ADMIN — PIPERACILLIN AND TAZOBACTAM 3375 MG: 3; .375 INJECTION, POWDER, LYOPHILIZED, FOR SOLUTION INTRAVENOUS at 05:50

## 2023-09-29 RX ADMIN — GUAIFENESIN 400 MG: 200 SOLUTION ORAL at 09:59

## 2023-09-29 RX ADMIN — SODIUM CHLORIDE, PRESERVATIVE FREE 10 ML: 5 INJECTION INTRAVENOUS at 20:21

## 2023-09-29 RX ADMIN — SODIUM CHLORIDE, PRESERVATIVE FREE 40 MG: 5 INJECTION INTRAVENOUS at 20:21

## 2023-09-29 RX ADMIN — LINEZOLID 600 MG: 600 TABLET, FILM COATED ORAL at 20:22

## 2023-09-29 RX ADMIN — SODIUM CHLORIDE, PRESERVATIVE FREE 40 MG: 5 INJECTION INTRAVENOUS at 10:01

## 2023-09-29 RX ADMIN — VALPROIC ACID 250 MG: 250 SOLUTION ORAL at 20:21

## 2023-09-29 RX ADMIN — ATORVASTATIN CALCIUM 10 MG: 10 TABLET, FILM COATED ORAL at 09:59

## 2023-09-29 RX ADMIN — APIXABAN 5 MG: 5 TABLET, FILM COATED ORAL at 20:22

## 2023-09-29 RX ADMIN — MIRTAZAPINE 30 MG: 15 TABLET, FILM COATED ORAL at 20:21

## 2023-09-29 RX ADMIN — VALPROIC ACID 250 MG: 250 SOLUTION ORAL at 14:15

## 2023-09-29 RX ADMIN — LINEZOLID 600 MG: 600 TABLET, FILM COATED ORAL at 09:59

## 2023-09-29 RX ADMIN — COLLAGENASE SANTYL: 250 OINTMENT TOPICAL at 11:07

## 2023-09-29 RX ADMIN — MEMANTINE 10 MG: 10 TABLET ORAL at 09:59

## 2023-09-29 RX ADMIN — METOPROLOL TARTRATE 50 MG: 50 TABLET ORAL at 09:59

## 2023-09-29 RX ADMIN — VALPROIC ACID 250 MG: 250 SOLUTION ORAL at 09:59

## 2023-09-29 RX ADMIN — MEMANTINE 10 MG: 10 TABLET ORAL at 20:22

## 2023-09-29 RX ADMIN — THIAMINE HYDROCHLORIDE 100 MG: 100 INJECTION, SOLUTION INTRAMUSCULAR; INTRAVENOUS at 10:00

## 2023-09-29 RX ADMIN — GUAIFENESIN 400 MG: 200 SOLUTION ORAL at 23:03

## 2023-09-29 RX ADMIN — SODIUM CHLORIDE, PRESERVATIVE FREE 10 ML: 5 INJECTION INTRAVENOUS at 10:00

## 2023-09-29 RX ADMIN — PETROLATUM: 420 OINTMENT TOPICAL at 11:07

## 2023-09-29 RX ADMIN — APIXABAN 5 MG: 5 TABLET, FILM COATED ORAL at 09:59

## 2023-09-29 RX ADMIN — PETROLATUM: 420 OINTMENT TOPICAL at 20:22

## 2023-09-29 RX ADMIN — ASPIRIN 81 MG CHEWABLE TABLET 81 MG: 81 TABLET CHEWABLE at 10:00

## 2023-09-29 RX ADMIN — PIPERACILLIN AND TAZOBACTAM 3375 MG: 3; .375 INJECTION, POWDER, LYOPHILIZED, FOR SOLUTION INTRAVENOUS at 11:46

## 2023-09-29 RX ADMIN — ERTAPENEM SODIUM 1000 MG: 1 INJECTION INTRAMUSCULAR; INTRAVENOUS at 16:40

## 2023-09-29 ASSESSMENT — PAIN SCALES - WONG BAKER
WONGBAKER_NUMERICALRESPONSE: 2
WONGBAKER_NUMERICALRESPONSE: 0

## 2023-09-29 ASSESSMENT — PAIN SCALES - GENERAL: PAINLEVEL_OUTOF10: 2

## 2023-09-29 NOTE — PROGRESS NOTES
Physical Therapy    Physical Therapy Treatment Note    Name: Alissa Mckeon  : 1940  MRN: [id number]      Date of Service: 2023    Evaluating PT:  Kevin Yang, PT, DPT  [id number]     Room #:  1660/6846-D  Diagnosis:  Hydropneumothorax [J94.8]  Empyema of lung ([address]) [J86.9]  Septicemia ([address]) [A41.9]  Empyema ([address]) [J86.9]  Pleural effusion on right [J90]  PMHx/PSHx:  A-fib, Dementia, Iron Deficiency Anemia  Procedure/Surgery:  Chest Tube Placement (23)  Precautions:  Falls, O2, NGT, R chest tube, R-sided weakness  Equipment Needs:  TBD    SUBJECTIVE:    Unable to gather information from pt about living situation, so information gathered from case management note. Pt is from [facility], with a history of dementia. OBJECTIVE:   Initial Evaluation  Date: 23 Treatment  23 Short Term/ Long Term   Goals   AM-PAC 6 Clicks 2/01 3/34    Was pt agreeable to Eval/treatment? Yes Yes    Does pt have pain? No No    Bed Mobility  Rolling: MaxAx2  Supine to sit: MaxAx2  Sit to supine: MaxAx2  Scooting: MaxAx2 Rolling: Max A  Supine to sit:  Max A  Sit to supine: MaxAx2  Scooting: MaxAx2 Rolling: ModA  Supine to sit: ModA  Sit to supine: ModA  Scooting: ModA   Transfers Sit to stand: NT  Stand to sit: NT  Stand pivot: NT Sit to stand: NT  Stand to sit: NT  Stand pivot: NT Sit to stand: MaxA  Stand to sit: MaxA  Stand pivot: MaxA   Ambulation    NT NT >2 feet with AAD MaxA   Stair negotiation: ascended and descended  NT NT NA   ROM RUE: Moderately restricted in all planes passively  LUE: WFLs passively  RLE: Moderately restricted in all planes passively  LLE: WFLs passively RLE: Moderately restricted in all planes passively  LLE: WFLs passively    Strength RUE: unable to follow commands  LUE:  strength 2/5, all others unable to follow commands to achieve a measurement  RLE: unable to follow commands  LLE: Knee extension 2/5, all others unable to follow commands to achieve a measurement

## 2023-09-29 NOTE — PROGRESS NOTES
Department of Internal Medicine  Infectious Diseases   Progress  Note      C/C :  Empyema /  leukocytosis     Pt is awake , no distress  Denies fever, chills, SOB   Afebrile       Current Facility-Administered Medications   Medication Dose Route Frequency Provider Last Rate Last Admin    linezolid (ZYVOX) tablet 600 mg  600 mg Oral 2 times per day Tim King MD   600 mg at 09/29/23 0959    0.9 % sodium chloride infusion   IntraVENous PRN Rohit Rubio MD        metoprolol tartrate (LOPRESSOR) tablet 50 mg  50 mg Oral BID Alisha Mckeon MD   50 mg at 09/29/23 0959    polyethylene glycol (GLYCOLAX) packet 17 g  17 g Oral Daily Domenic Pretty MD   17 g at 09/29/23 1109    guaiFENesin (ROBITUSSIN) 100 MG/5ML liquid 400 mg  400 mg Oral Q12H Domenic Pretty MD   400 mg at 09/29/23 0959    thiamine (B-1) injection 100 mg  100 mg IntraVENous Daily Domenic Pretty MD   100 mg at 09/29/23 1000    white petrolatum ointment   Topical BID Aubrey Jackson MD   Given at 09/29/23 1107    And    white petrolatum ointment   Topical TID PRN Aubrey Jackson MD        collagenase ointment   Topical Daily Aubrey Jackson MD   Given at 09/29/23 1107    valproic acid (DEPAKENE) 250 MG/5ML oral solution 250 mg  250 mg Oral TID Anna Etienne MD   250 mg at 09/29/23 1415    pantoprazole (PROTONIX) 40 mg in sodium chloride (PF) 0.9 % 10 mL injection  40 mg IntraVENous Q12H Domenic Pretty MD   40 mg at 09/29/23 1001    glucose chewable tablet 16 g  4 tablet Oral PRN Domenic Pretty MD        dextrose bolus 10% 125 mL  125 mL IntraVENous PRN Domenic Pretty MD        Or    dextrose bolus 10% 250 mL  250 mL IntraVENous PRN Domenic Pretty MD        glucagon injection 1 mg  1 mg SubCUTAneous PRN Domenic Pretty MD        dextrose 10 % infusion   IntraVENous Continuous PRN Domenic Pretty MD        insulin lispro (HUMALOG) injection vial 0-4 Units  0-4 Units SubCUTAneous Q6H Domenic Pretty MD        piperacillin-tazobactam ([facility])

## 2023-09-29 NOTE — CARE COORDINATION
9/29/23 Update CM Note. Pt admitted 9/18/23 for Pleural effusion on right. Chest tube in place to water seal, no drainage x 24 hr CXR today. IV Zosyn and PO Zyvox per ID Discharge plan to [facility] where pt is along term bed hold. Destination/DARRION updated. Ambulance form/ envelope previously placed on chart .   Derek Schwab BSN RN-BC  [phone]

## 2023-09-29 NOTE — PLAN OF CARE
Problem: Discharge Planning  Goal: Discharge to home or other facility with appropriate resources  Outcome: Progressing     Problem: Safety - Adult  Goal: Free from fall injury  Outcome: Progressing     Problem: Skin/Tissue Integrity  Goal: Absence of new skin breakdown  Description: 1. Monitor for areas of redness and/or skin breakdown  2. Assess vascular access sites hourly  3. Every 4-6 hours minimum:  Change oxygen saturation probe site  4. Every 4-6 hours:  If on nasal continuous positive airway pressure, respiratory therapy assess nares and determine need for appliance change or resting period.   Outcome: Progressing     Problem: Pain  Goal: Verbalizes/displays adequate comfort level or baseline comfort level  Outcome: Progressing     Problem: Neurosensory - Adult  Goal: Achieves stable or improved neurological status  Outcome: Progressing     Problem: Respiratory - Adult  Goal: Achieves optimal ventilation and oxygenation  Outcome: Progressing     Problem: Cardiovascular - Adult  Goal: Maintains optimal cardiac output and hemodynamic stability  Outcome: Progressing     Problem: Skin/Tissue Integrity - Adult  Goal: Skin integrity remains intact  Outcome: Progressing  Goal: Incisions, wounds, or drain sites healing without S/S of infection  Outcome: Progressing     Problem: Gastrointestinal - Adult  Goal: Maintains adequate nutritional intake  Outcome: Progressing     Problem: Genitourinary - Adult  Goal: Absence of urinary retention  Outcome: Progressing     Problem: Metabolic/Fluid and Electrolytes - Adult  Goal: Electrolytes maintained within normal limits  Outcome: Progressing     Problem: Nutrition Deficit:  Goal: Optimize nutritional status  Outcome: Progressing     Problem: ABCDS Injury Assessment  Goal: Absence of physical injury  Outcome: Progressing

## 2023-09-29 NOTE — PROGRESS NOTES
[address] [address], [place]       Date:2023                                                               Patient Name: Lanette Stein  MRN: [id number]  : 1940  Room: [address] [place] [address]    Evaluating OT: WAYNE Meyer,  OTR/L; [id number]    Referring Provider: DOMINIQUE Ervin CNP   Specific Provider Orders/Date: OT eval and treat (23)       Diagnosis: Hydropneumothorax [J94.8]  Empyema of lung ([address]) [J86.9]  Septicemia ([address]) [A41.9]  Empyema ([address]) [J86.9]  Pleural effusion on right [J90]     Reason for admission: Pt admitted with pleural effusion, hypotension. Surgery/Procedures: None this admission     Pertinent Medical History:    History reviewed. No pertinent past medical history. *Precautions:  Fall Risk, Contact isolation, R ashley, R neglect, R inattention, cognition, incontinent, hx dementia/CVA, R chest tube, NGT    Assessment of current deficits   [x] Functional mobility  [x]ADLs  [x] Strength               [x]Cognition   [x] Functional transfers   [x] IADLs         [x] Safety Awareness   [x]Endurance   [x] Fine Coordination        [x] ROM     [x] Vision/perception   []Sensation    [x]Gross Motor Coordination [x] Balance   [x] Delirium                  []Motor Control     [x] Communication    OT PLAN OF CARE   OT POC based on physician orders, patient diagnosis and results of clinical assessment.        Frequency/Duration: 1-3 days/wk for 1-2 weeks PRN    Specific OT Treatment Interventions to include:   * Instruction/training on adapted ADL techniques and AE recommendations to increase functional independence within precautions       * Training on energy conservation strategies, correct breathing pattern and techniques to improve independence/tolerance for self-care routine  * Functional transfer/mobility training/DME recommendations for increased independence,

## 2023-09-29 NOTE — PROGRESS NOTES
as reasonably achievable. COMPARISON: Correlation with radiographs of September 18, 2023 and July 4, 2023 HISTORY: ORDERING SYSTEM PROVIDED HISTORY: r/o PE TECHNOLOGIST PROVIDED HISTORY: Reason for exam:->r/o PE What reading provider will be dictating this exam?->CRC FINDINGS: Pulmonary Arteries: Pulmonary arteries are adequately opacified for evaluation. No evidence of intraluminal filling defect to suggest pulmonary embolism. Motion artifact limits evaluation of the subsegmental left basilar pulmonary arteries. Main pulmonary artery is normal in caliber. Mediastinum: No evidence of mediastinal lymphadenopathy. The heart and pericardium demonstrate no acute abnormality. There is no acute abnormality of the thoracic aorta. Supra-aortic trunks are widely patent. Lungs/pleura: Extremely large right pleural effusion with partially loculated anterior hydropneumothorax component, possibly related to superimposed empyema at the anterior right base and extending to the anterior inferior right pulmonary hilum. Complete collapse of the right lung. Mild leftward shift of the cardio mediastinum. Upper Abdomen: Cholelithiasis. Otherwise, limited images of the upper abdomen are unremarkable. Soft Tissues/Bones: No acute bone or soft tissue abnormality. 1. No evidence of pulmonary embolism. 2. Extremely large right pleural effusion with partially loculated anterior hydropneumothorax component, possibly related to superimposed empyema at the anterior right base and extending to the anterior inferior right pulmonary hilum. 3. Complete collapse of the right lung with mild leftward shift of the cardio mediastinum secondary to #2 above. RECOMMENDATIONS: Careful clinical correlation and follow up recommended.      CT HEAD WO CONTRAST    Result Date: 9/18/2023  EXAMINATION: CT OF THE HEAD WITHOUT CONTRAST  9/18/2023 2:41 pm TECHNIQUE: CT of the head was performed without the administration of intravenous contrast. Automated mg  400 mg Oral Q12H Stephanie Pisano MD   400 mg at 09/29/23 0959    thiamine (B-1) injection 100 mg  100 mg IntraVENous Daily Stephanie Pisano MD   100 mg at 09/29/23 1000    white petrolatum ointment   Topical BID Roni Yarbrough MD   Given at 09/29/23 1107    And    white petrolatum ointment   Topical TID PRN Roni Yarbrough MD        collagenase ointment   Topical Daily Roni Yarbrough MD   Given at 09/29/23 1107    valproic acid (DEPAKENE) 250 MG/5ML oral solution 250 mg  250 mg Oral TID Sushma Christianson MD   250 mg at 09/29/23 1415    pantoprazole (PROTONIX) 40 mg in sodium chloride (PF) 0.9 % 10 mL injection  40 mg IntraVENous Q12H Stephanie Pisano MD   40 mg at 09/29/23 1001    glucose chewable tablet 16 g  4 tablet Oral PRN Stephanie Pisano MD        dextrose bolus 10% 125 mL  125 mL IntraVENous PRN Stephanie Pisano MD        Or    dextrose bolus 10% 250 mL  250 mL IntraVENous PRN Stephanie Pisano MD        glucagon injection 1 mg  1 mg SubCUTAneous PRN Stephanie Pisano MD        dextrose 10 % infusion   IntraVENous Continuous PRN Stephanie Pisano MD        insulin lispro (HUMALOG) injection vial 0-4 Units  0-4 Units SubCUTAneous Q6H Stephanie Pisano MD        apixaban Devaughn Olivier) tablet 5 mg  5 mg Oral BID Brandon Olguin, APRN - CNP   5 mg at 09/29/23 6708    aspirin chewable tablet 81 mg  81 mg Oral Daily Brandon Nasir Olguin, APRN - CNP   81 mg at 09/29/23 1000    atorvastatin (LIPITOR) tablet 10 mg  10 mg Oral Daily Brandon Nasir Olguin, APRN - CNP   10 mg at 09/29/23 0959    [Held by provider] donepezil (ARICEPT) tablet 10 mg  10 mg Oral Nightly Brandon Nasir Olguin, APRN - CNP        memantine (NAMENDA) tablet 10 mg  10 mg Oral BID Brandon Nasir Olguin, APRN - CNP   10 mg at 09/29/23 0959    mirtazapine (REMERON) tablet 30 mg  30 mg Oral Nightly Brandonbritt Olguin, APRN - CNP   30 mg at 09/28/23 4547    sodium chloride flush 0.9 % injection 5-40 mL  5-40 mL IntraVENous 2 times per day Brandon Olguin, APRN - CNP   10 mL at 09/29/23 1000    sodium chloride flush 0.9 % injection 10 mL  10 mL IntraVENous PRN Verle Messing Learn, APRN - CNP        0.9 % sodium chloride infusion   IntraVENous PRN Verle Messing Learn, APRN - CNP        acetaminophen (TYLENOL) tablet 650 mg  650 mg Oral Q6H PRN Verle Messing Learn, APRN - CNP        Or    acetaminophen (TYLENOL) suppository 650 mg  650 mg Rectal Q6H PRN Verle Messing Learn, APRN - CNP          - only 20 cc of fluid output in the right pigtail chest tube in last 48 hours  - 10 Fr right pigtail chest tube removal at 4:48 PM at bedside, CXR to follow  - supportive care to continue for patient   - Pt/OT, nutritional support, strength training   - await D/C planning     Gurinder Loo MD  9/29/2023  4:50 PM

## 2023-09-29 NOTE — PLAN OF CARE
Problem: Discharge Planning  Goal: Discharge to home or other facility with appropriate resources  Outcome: Progressing     Problem: Safety - Adult  Goal: Free from fall injury  Outcome: Progressing     Problem: Skin/Tissue Integrity  Goal: Absence of new skin breakdown  Description: 1. Monitor for areas of redness and/or skin breakdown  2. Assess vascular access sites hourly  3. Every 4-6 hours minimum:  Change oxygen saturation probe site  4. Every 4-6 hours:  If on nasal continuous positive airway pressure, respiratory therapy assess nares and determine need for appliance change or resting period.   Outcome: Progressing     Problem: Pain  Goal: Verbalizes/displays adequate comfort level or baseline comfort level  Outcome: Progressing     Problem: Neurosensory - Adult  Goal: Achieves stable or improved neurological status  Outcome: Progressing     Problem: Respiratory - Adult  Goal: Achieves optimal ventilation and oxygenation  Outcome: Progressing     Problem: Cardiovascular - Adult  Goal: Maintains optimal cardiac output and hemodynamic stability  Outcome: Progressing     Problem: Skin/Tissue Integrity - Adult  Goal: Skin integrity remains intact  Outcome: Progressing  Goal: Incisions, wounds, or drain sites healing without S/S of infection  Outcome: Progressing     Problem: Gastrointestinal - Adult  Goal: Maintains adequate nutritional intake  Outcome: Progressing     Problem: Genitourinary - Adult  Goal: Absence of urinary retention  Outcome: Progressing     Problem: Metabolic/Fluid and Electrolytes - Adult  Goal: Electrolytes maintained within normal limits  Outcome: Progressing     Problem: Nutrition Deficit:  Goal: Optimize nutritional status  Outcome: Progressing  Flowsheets (Taken 9/28/2023 1407 by Domitila Cormier RD, LD)  Nutrient intake appropriate for improving, restoring, or maintaining nutritional needs: Recommend, monitor, and adjust tube feedings and TPN/PPN based on assessed needs

## 2023-09-30 LAB
ANION GAP SERPL CALCULATED.3IONS-SCNC: 10 MMOL/L (ref 7–16)
BASOPHILS # BLD: 0.03 K/UL (ref 0–0.2)
BASOPHILS NFR BLD: 0 % (ref 0–2)
BUN SERPL-MCNC: 23 MG/DL (ref 6–23)
CALCIUM SERPL-MCNC: 8.2 MG/DL (ref 8.6–10.2)
CHLORIDE SERPL-SCNC: 105 MMOL/L (ref 98–107)
CO2 SERPL-SCNC: 27 MMOL/L (ref 22–29)
CREAT SERPL-MCNC: 0.4 MG/DL (ref 0.5–1)
EOSINOPHIL # BLD: 0.3 K/UL (ref 0.05–0.5)
EOSINOPHILS RELATIVE PERCENT: 2 % (ref 0–6)
ERYTHROCYTE [DISTWIDTH] IN BLOOD BY AUTOMATED COUNT: 20.3 % (ref 11.5–15)
GFR SERPL CREATININE-BSD FRML MDRD: >60 ML/MIN/1.73M2
GLUCOSE BLD-MCNC: 117 MG/DL (ref 74–99)
GLUCOSE BLD-MCNC: 124 MG/DL (ref 74–99)
GLUCOSE BLD-MCNC: 81 MG/DL (ref 74–99)
GLUCOSE BLD-MCNC: 98 MG/DL (ref 74–99)
GLUCOSE SERPL-MCNC: 103 MG/DL (ref 74–99)
HCT VFR BLD AUTO: 28.5 % (ref 34–48)
HGB BLD-MCNC: 8.4 G/DL (ref 11.5–15.5)
IMM GRANULOCYTES # BLD AUTO: 0.33 K/UL (ref 0–0.58)
IMM GRANULOCYTES NFR BLD: 3 % (ref 0–5)
LYMPHOCYTES NFR BLD: 2.1 K/UL (ref 1.5–4)
LYMPHOCYTES RELATIVE PERCENT: 17 % (ref 20–42)
MCH RBC QN AUTO: 27.6 PG (ref 26–35)
MCHC RBC AUTO-ENTMCNC: 29.5 G/DL (ref 32–34.5)
MCV RBC AUTO: 93.8 FL (ref 80–99.9)
MONOCYTES NFR BLD: 0.98 K/UL (ref 0.1–0.95)
MONOCYTES NFR BLD: 8 % (ref 2–12)
NEUTROPHILS NFR BLD: 70 % (ref 43–80)
NEUTS SEG NFR BLD: 8.9 K/UL (ref 1.8–7.3)
PLATELET # BLD AUTO: 433 K/UL (ref 130–450)
PMV BLD AUTO: 9.9 FL (ref 7–12)
POTASSIUM SERPL-SCNC: 3.5 MMOL/L (ref 3.5–5)
RBC # BLD AUTO: 3.04 M/UL (ref 3.5–5.5)
RBC # BLD: ABNORMAL 10*6/UL
SODIUM SERPL-SCNC: 142 MMOL/L (ref 132–146)
WBC OTHER # BLD: 12.6 K/UL (ref 4.5–11.5)

## 2023-09-30 PROCEDURE — 6370000000 HC RX 637 (ALT 250 FOR IP): Performed by: CHIROPRACTOR

## 2023-09-30 PROCEDURE — 6370000000 HC RX 637 (ALT 250 FOR IP): Performed by: INTERNAL MEDICINE

## 2023-09-30 PROCEDURE — 82962 GLUCOSE BLOOD TEST: CPT

## 2023-09-30 PROCEDURE — 2500000003 HC RX 250 WO HCPCS: Performed by: STUDENT IN AN ORGANIZED HEALTH CARE EDUCATION/TRAINING PROGRAM

## 2023-09-30 PROCEDURE — 2580000003 HC RX 258: Performed by: STUDENT IN AN ORGANIZED HEALTH CARE EDUCATION/TRAINING PROGRAM

## 2023-09-30 PROCEDURE — C9113 INJ PANTOPRAZOLE SODIUM, VIA: HCPCS | Performed by: STUDENT IN AN ORGANIZED HEALTH CARE EDUCATION/TRAINING PROGRAM

## 2023-09-30 PROCEDURE — 6370000000 HC RX 637 (ALT 250 FOR IP): Performed by: FAMILY MEDICINE

## 2023-09-30 PROCEDURE — 36415 COLL VENOUS BLD VENIPUNCTURE: CPT

## 2023-09-30 PROCEDURE — 6360000002 HC RX W HCPCS: Performed by: INTERNAL MEDICINE

## 2023-09-30 PROCEDURE — 80048 BASIC METABOLIC PNL TOTAL CA: CPT

## 2023-09-30 PROCEDURE — A4216 STERILE WATER/SALINE, 10 ML: HCPCS | Performed by: STUDENT IN AN ORGANIZED HEALTH CARE EDUCATION/TRAINING PROGRAM

## 2023-09-30 PROCEDURE — 85025 COMPLETE CBC W/AUTO DIFF WBC: CPT

## 2023-09-30 PROCEDURE — 2700000000 HC OXYGEN THERAPY PER DAY

## 2023-09-30 PROCEDURE — 6360000002 HC RX W HCPCS: Performed by: NURSE PRACTITIONER

## 2023-09-30 PROCEDURE — 6360000002 HC RX W HCPCS: Performed by: STUDENT IN AN ORGANIZED HEALTH CARE EDUCATION/TRAINING PROGRAM

## 2023-09-30 PROCEDURE — 94640 AIRWAY INHALATION TREATMENT: CPT

## 2023-09-30 PROCEDURE — 2580000003 HC RX 258: Performed by: FAMILY MEDICINE

## 2023-09-30 PROCEDURE — 2580000003 HC RX 258: Performed by: INTERNAL MEDICINE

## 2023-09-30 PROCEDURE — 6370000000 HC RX 637 (ALT 250 FOR IP): Performed by: STUDENT IN AN ORGANIZED HEALTH CARE EDUCATION/TRAINING PROGRAM

## 2023-09-30 PROCEDURE — 2060000000 HC ICU INTERMEDIATE R&B

## 2023-09-30 PROCEDURE — A4216 STERILE WATER/SALINE, 10 ML: HCPCS | Performed by: FAMILY MEDICINE

## 2023-09-30 RX ORDER — ALBUTEROL SULFATE 2.5 MG/3ML
2.5 SOLUTION RESPIRATORY (INHALATION) EVERY 12 HOURS
Status: DISCONTINUED | OUTPATIENT
Start: 2023-09-30 | End: 2023-10-05 | Stop reason: HOSPADM

## 2023-09-30 RX ADMIN — MEMANTINE 10 MG: 10 TABLET ORAL at 08:07

## 2023-09-30 RX ADMIN — METOPROLOL TARTRATE 50 MG: 50 TABLET ORAL at 08:07

## 2023-09-30 RX ADMIN — GUAIFENESIN 400 MG: 200 SOLUTION ORAL at 23:14

## 2023-09-30 RX ADMIN — APIXABAN 5 MG: 5 TABLET, FILM COATED ORAL at 08:06

## 2023-09-30 RX ADMIN — PETROLATUM: 420 OINTMENT TOPICAL at 08:07

## 2023-09-30 RX ADMIN — ATORVASTATIN CALCIUM 10 MG: 10 TABLET, FILM COATED ORAL at 08:07

## 2023-09-30 RX ADMIN — POTASSIUM BICARBONATE 40 MEQ: 782 TABLET, EFFERVESCENT ORAL at 11:54

## 2023-09-30 RX ADMIN — POLYETHYLENE GLYCOL 3350 17 G: 17 POWDER, FOR SOLUTION ORAL at 08:06

## 2023-09-30 RX ADMIN — ALBUTEROL SULFATE 2.5 MG: 2.5 SOLUTION RESPIRATORY (INHALATION) at 19:46

## 2023-09-30 RX ADMIN — APIXABAN 5 MG: 5 TABLET, FILM COATED ORAL at 19:48

## 2023-09-30 RX ADMIN — SODIUM CHLORIDE, PRESERVATIVE FREE 10 ML: 5 INJECTION INTRAVENOUS at 08:07

## 2023-09-30 RX ADMIN — METOPROLOL TARTRATE 50 MG: 50 TABLET ORAL at 19:48

## 2023-09-30 RX ADMIN — GUAIFENESIN 400 MG: 200 SOLUTION ORAL at 11:19

## 2023-09-30 RX ADMIN — SODIUM CHLORIDE, PRESERVATIVE FREE 40 MG: 5 INJECTION INTRAVENOUS at 19:48

## 2023-09-30 RX ADMIN — MIRTAZAPINE 30 MG: 15 TABLET, FILM COATED ORAL at 19:48

## 2023-09-30 RX ADMIN — SODIUM CHLORIDE, PRESERVATIVE FREE 40 MG: 5 INJECTION INTRAVENOUS at 08:07

## 2023-09-30 RX ADMIN — VALPROIC ACID 250 MG: 250 SOLUTION ORAL at 19:47

## 2023-09-30 RX ADMIN — THIAMINE HYDROCHLORIDE 100 MG: 100 INJECTION, SOLUTION INTRAMUSCULAR; INTRAVENOUS at 08:07

## 2023-09-30 RX ADMIN — VALPROIC ACID 250 MG: 250 SOLUTION ORAL at 08:06

## 2023-09-30 RX ADMIN — LINEZOLID 600 MG: 600 TABLET, FILM COATED ORAL at 19:48

## 2023-09-30 RX ADMIN — PETROLATUM: 420 OINTMENT TOPICAL at 19:50

## 2023-09-30 RX ADMIN — LINEZOLID 600 MG: 600 TABLET, FILM COATED ORAL at 08:06

## 2023-09-30 RX ADMIN — VALPROIC ACID 250 MG: 250 SOLUTION ORAL at 16:06

## 2023-09-30 RX ADMIN — MEMANTINE 10 MG: 10 TABLET ORAL at 19:47

## 2023-09-30 RX ADMIN — ASPIRIN 81 MG CHEWABLE TABLET 81 MG: 81 TABLET CHEWABLE at 08:07

## 2023-09-30 RX ADMIN — SODIUM CHLORIDE, PRESERVATIVE FREE 10 ML: 5 INJECTION INTRAVENOUS at 19:47

## 2023-09-30 RX ADMIN — ERTAPENEM SODIUM 1000 MG: 1 INJECTION INTRAMUSCULAR; INTRAVENOUS at 16:08

## 2023-09-30 NOTE — PROGRESS NOTES
Department of Internal Medicine  Infectious Diseases   Progress  Note      C/C :  Empyema /  leukocytosis     Pt is awake , no distress  Denies fever, chills, SOB   Afebrile       Current Facility-Administered Medications   Medication Dose Route Frequency Provider Last Rate Last Admin    albuterol (PROVENTIL) (2.5 MG/3ML) 0.083% nebulizer solution 2.5 mg  2.5 mg Nebulization Q12H Beth Tariq, APRN - CNP        ertapenem Guillermo Lemos) 1,000 mg in sodium chloride 0.9 % 50 mL IVPB (mini-bag)  1,000 mg IntraVENous Q24H Dorita Phoenix, MD   Stopped at 09/29/23 1826    linezolid (ZYVOX) tablet 600 mg  600 mg Oral 2 times per day Caden King MD   600 mg at 09/30/23 0806    0.9 % sodium chloride infusion   IntraVENous PRN Ellyn Blank MD        metoprolol tartrate (LOPRESSOR) tablet 50 mg  50 mg Oral BID Tabatha Acevedo MD   50 mg at 09/30/23 0807    polyethylene glycol (GLYCOLAX) packet 17 g  17 g Oral Daily Bala Mart MD   17 g at 09/30/23 0806    guaiFENesin (ROBITUSSIN) 100 MG/5ML liquid 400 mg  400 mg Oral Q12H Bala Mart MD   400 mg at 09/30/23 1119    thiamine (B-1) injection 100 mg  100 mg IntraVENous Daily Bala Mart MD   100 mg at 09/30/23 6369    white petrolatum ointment   Topical BID Megan Dobbins MD   Given at 09/30/23 4633    And    white petrolatum ointment   Topical TID PRN Megan Dobbins MD        collagenase ointment   Topical Daily Megan Dobbins MD   Given at 09/29/23 1107    valproic acid (DEPAKENE) 250 MG/5ML oral solution 250 mg  250 mg Oral TID Ntay Garvey MD   250 mg at 09/30/23 0806    pantoprazole (PROTONIX) 40 mg in sodium chloride (PF) 0.9 % 10 mL injection  40 mg IntraVENous Q12H Bala Mart MD   40 mg at 09/30/23 0807    glucose chewable tablet 16 g  4 tablet Oral PRN Bala Mart MD        dextrose bolus 10% 125 mL  125 mL IntraVENous PRN Bala Mart MD        Or    dextrose bolus 10% 250 mL  250 mL IntraVENous PRN Bala Mart MD PANEL RUBEN Final    meropenem Sensitive <=0.25 BACTERIAL SUSCEPTIBILITY PANEL RUBEN Final    piperacillin-tazobactam Sensitive <=4 BACTERIAL SUSCEPTIBILITY PANEL RUBEN Final    trimethoprim-sulfamethoxazole Resistant >=320 BACTERIAL SUSCEPTIBILITY PANEL RUBEN Final          Resulting Agency [facility]        Susceptibility    Klebsiella pneumoniae (1)    Antibiotic Interpretation Microscan Method Status    ceFAZolin Resistant >=64 BACTERIAL SUSCEPTIBILITY PANEL RUBEN Final    cefepime Resistant >=32 BACTERIAL SUSCEPTIBILITY PANEL RUBEN Final    cefotaxime Resistant >=64 BACTERIAL SUSCEPTIBILITY PANEL RUBEN Final    cefOXitin Sensitive <=4 BACTERIAL SUSCEPTIBILITY PANEL RUBEN Final    cefTAZidime Intermediate 16 BACTERIAL SUSCEPTIBILITY PANEL RUBEN Final    ciprofloxacin Sensitive 1 BACTERIAL SUSCEPTIBILITY PANEL RUBEN Final    levofloxacin Sensitive 1 BACTERIAL SUSCEPTIBILITY PANEL RUBEN Final    meropenem Sensitive <=0.25 BACTERIAL SUSCEPTIBILITY PANEL RUBEN Final    piperacillin-tazobactam Sensitive <=4 BACTERIAL SUSCEPTIBILITY PANEL RUBEN Final    trimethoprim-sulfamethoxazole Resistant >=320 BACTERIAL SUSCEPTIBILITY PANEL RUBEN Final               Radiology :      Chest x ray -  Partial interval clearing of right basilar atelectasis and pleural effusion. IMPRESSION:     Right pleural effusion-( exudative ) empyema s/p pig tail drainage ( 9/20)   Leukocytosis - 13   K     RECOMMENDATIONS:       1. Invanz 1 gram IV q 24 hrs ( for once a day administration )   2. Oral zyvox 600 mg po q 12 hrs   3.  Monitor CBC with diff     9/30/2023  Samir Torres MD

## 2023-09-30 NOTE — PLAN OF CARE
Problem: Discharge Planning  Goal: Discharge to home or other facility with appropriate resources  9/30/2023 0310 by Emily Damico RN  Outcome: Progressing     Problem: Safety - Adult  Goal: Free from fall injury  9/30/2023 0310 by Emily Damico RN  Outcome: Progressing     Problem: Skin/Tissue Integrity  Goal: Absence of new skin breakdown  Description: 1. Monitor for areas of redness and/or skin breakdown  2. Assess vascular access sites hourly  3. Every 4-6 hours minimum:  Change oxygen saturation probe site  4. Every 4-6 hours:  If on nasal continuous positive airway pressure, respiratory therapy assess nares and determine need for appliance change or resting period.   9/30/2023 0310 by Emily Damico RN  Outcome: Progressing     Problem: Pain  Goal: Verbalizes/displays adequate comfort level or baseline comfort level  9/30/2023 0310 by Emily Damico RN  Outcome: Progressing

## 2023-10-01 LAB
ANION GAP SERPL CALCULATED.3IONS-SCNC: 11 MMOL/L (ref 7–16)
BASOPHILS # BLD: 0.02 K/UL (ref 0–0.2)
BASOPHILS NFR BLD: 0 % (ref 0–2)
BUN SERPL-MCNC: 19 MG/DL (ref 6–23)
CALCIUM SERPL-MCNC: 8.5 MG/DL (ref 8.6–10.2)
CHLORIDE SERPL-SCNC: 106 MMOL/L (ref 98–107)
CO2 SERPL-SCNC: 26 MMOL/L (ref 22–29)
CREAT SERPL-MCNC: 0.4 MG/DL (ref 0.5–1)
EOSINOPHIL # BLD: 0.26 K/UL (ref 0.05–0.5)
EOSINOPHILS RELATIVE PERCENT: 2 % (ref 0–6)
ERYTHROCYTE [DISTWIDTH] IN BLOOD BY AUTOMATED COUNT: 20.7 % (ref 11.5–15)
GFR SERPL CREATININE-BSD FRML MDRD: >60 ML/MIN/1.73M2
GLUCOSE BLD-MCNC: 109 MG/DL (ref 74–99)
GLUCOSE BLD-MCNC: 113 MG/DL (ref 74–99)
GLUCOSE SERPL-MCNC: 99 MG/DL (ref 74–99)
HCT VFR BLD AUTO: 27.2 % (ref 34–48)
HGB BLD-MCNC: 8.2 G/DL (ref 11.5–15.5)
IMM GRANULOCYTES # BLD AUTO: 0.15 K/UL (ref 0–0.58)
IMM GRANULOCYTES NFR BLD: 1 % (ref 0–5)
LYMPHOCYTES NFR BLD: 2.16 K/UL (ref 1.5–4)
LYMPHOCYTES RELATIVE PERCENT: 19 % (ref 20–42)
MCH RBC QN AUTO: 28.5 PG (ref 26–35)
MCHC RBC AUTO-ENTMCNC: 30.1 G/DL (ref 32–34.5)
MCV RBC AUTO: 94.4 FL (ref 80–99.9)
MONOCYTES NFR BLD: 0.8 K/UL (ref 0.1–0.95)
MONOCYTES NFR BLD: 7 % (ref 2–12)
NEUTROPHILS NFR BLD: 71 % (ref 43–80)
NEUTS SEG NFR BLD: 8.16 K/UL (ref 1.8–7.3)
PLATELET # BLD AUTO: 449 K/UL (ref 130–450)
PMV BLD AUTO: 9.9 FL (ref 7–12)
POTASSIUM SERPL-SCNC: 3.9 MMOL/L (ref 3.5–5)
RBC # BLD AUTO: 2.88 M/UL (ref 3.5–5.5)
RBC # BLD: ABNORMAL 10*6/UL
SODIUM SERPL-SCNC: 143 MMOL/L (ref 132–146)
WBC OTHER # BLD: 11.6 K/UL (ref 4.5–11.5)

## 2023-10-01 PROCEDURE — 2580000003 HC RX 258: Performed by: STUDENT IN AN ORGANIZED HEALTH CARE EDUCATION/TRAINING PROGRAM

## 2023-10-01 PROCEDURE — A4216 STERILE WATER/SALINE, 10 ML: HCPCS | Performed by: FAMILY MEDICINE

## 2023-10-01 PROCEDURE — 2500000003 HC RX 250 WO HCPCS: Performed by: STUDENT IN AN ORGANIZED HEALTH CARE EDUCATION/TRAINING PROGRAM

## 2023-10-01 PROCEDURE — 80048 BASIC METABOLIC PNL TOTAL CA: CPT

## 2023-10-01 PROCEDURE — 2580000003 HC RX 258: Performed by: FAMILY MEDICINE

## 2023-10-01 PROCEDURE — 6370000000 HC RX 637 (ALT 250 FOR IP): Performed by: CHIROPRACTOR

## 2023-10-01 PROCEDURE — C9113 INJ PANTOPRAZOLE SODIUM, VIA: HCPCS | Performed by: STUDENT IN AN ORGANIZED HEALTH CARE EDUCATION/TRAINING PROGRAM

## 2023-10-01 PROCEDURE — 2700000000 HC OXYGEN THERAPY PER DAY

## 2023-10-01 PROCEDURE — 2060000000 HC ICU INTERMEDIATE R&B

## 2023-10-01 PROCEDURE — 6370000000 HC RX 637 (ALT 250 FOR IP): Performed by: FAMILY MEDICINE

## 2023-10-01 PROCEDURE — A4216 STERILE WATER/SALINE, 10 ML: HCPCS | Performed by: STUDENT IN AN ORGANIZED HEALTH CARE EDUCATION/TRAINING PROGRAM

## 2023-10-01 PROCEDURE — 94667 MNPJ CHEST WALL 1ST: CPT

## 2023-10-01 PROCEDURE — 2580000003 HC RX 258: Performed by: INTERNAL MEDICINE

## 2023-10-01 PROCEDURE — 85025 COMPLETE CBC W/AUTO DIFF WBC: CPT

## 2023-10-01 PROCEDURE — 6370000000 HC RX 637 (ALT 250 FOR IP): Performed by: INTERNAL MEDICINE

## 2023-10-01 PROCEDURE — 6360000002 HC RX W HCPCS: Performed by: STUDENT IN AN ORGANIZED HEALTH CARE EDUCATION/TRAINING PROGRAM

## 2023-10-01 PROCEDURE — 82962 GLUCOSE BLOOD TEST: CPT

## 2023-10-01 PROCEDURE — 6360000002 HC RX W HCPCS: Performed by: NURSE PRACTITIONER

## 2023-10-01 PROCEDURE — 6360000002 HC RX W HCPCS: Performed by: INTERNAL MEDICINE

## 2023-10-01 PROCEDURE — 94640 AIRWAY INHALATION TREATMENT: CPT

## 2023-10-01 PROCEDURE — 36415 COLL VENOUS BLD VENIPUNCTURE: CPT

## 2023-10-01 RX ADMIN — SODIUM CHLORIDE, PRESERVATIVE FREE 40 MG: 5 INJECTION INTRAVENOUS at 20:29

## 2023-10-01 RX ADMIN — SODIUM CHLORIDE, PRESERVATIVE FREE 10 ML: 5 INJECTION INTRAVENOUS at 20:30

## 2023-10-01 RX ADMIN — VALPROIC ACID 250 MG: 250 SOLUTION ORAL at 08:02

## 2023-10-01 RX ADMIN — SODIUM CHLORIDE, PRESERVATIVE FREE 40 MG: 5 INJECTION INTRAVENOUS at 08:02

## 2023-10-01 RX ADMIN — ALBUTEROL SULFATE 2.5 MG: 2.5 SOLUTION RESPIRATORY (INHALATION) at 07:18

## 2023-10-01 RX ADMIN — SODIUM CHLORIDE, PRESERVATIVE FREE 10 ML: 5 INJECTION INTRAVENOUS at 08:02

## 2023-10-01 RX ADMIN — MIRTAZAPINE 30 MG: 15 TABLET, FILM COATED ORAL at 20:29

## 2023-10-01 RX ADMIN — APIXABAN 5 MG: 5 TABLET, FILM COATED ORAL at 20:29

## 2023-10-01 RX ADMIN — COLLAGENASE SANTYL: 250 OINTMENT TOPICAL at 08:03

## 2023-10-01 RX ADMIN — VALPROIC ACID 250 MG: 250 SOLUTION ORAL at 15:14

## 2023-10-01 RX ADMIN — ALBUTEROL SULFATE 2.5 MG: 2.5 SOLUTION RESPIRATORY (INHALATION) at 19:15

## 2023-10-01 RX ADMIN — PETROLATUM: 420 OINTMENT TOPICAL at 08:03

## 2023-10-01 RX ADMIN — METOPROLOL TARTRATE 50 MG: 50 TABLET ORAL at 20:29

## 2023-10-01 RX ADMIN — GUAIFENESIN 400 MG: 200 SOLUTION ORAL at 10:25

## 2023-10-01 RX ADMIN — METOPROLOL TARTRATE 50 MG: 50 TABLET ORAL at 08:03

## 2023-10-01 RX ADMIN — LINEZOLID 600 MG: 600 TABLET, FILM COATED ORAL at 08:03

## 2023-10-01 RX ADMIN — LINEZOLID 600 MG: 600 TABLET, FILM COATED ORAL at 20:29

## 2023-10-01 RX ADMIN — MEMANTINE 10 MG: 10 TABLET ORAL at 08:02

## 2023-10-01 RX ADMIN — ASPIRIN 81 MG CHEWABLE TABLET 81 MG: 81 TABLET CHEWABLE at 08:02

## 2023-10-01 RX ADMIN — PETROLATUM: 420 OINTMENT TOPICAL at 20:30

## 2023-10-01 RX ADMIN — VALPROIC ACID 250 MG: 250 SOLUTION ORAL at 20:29

## 2023-10-01 RX ADMIN — THIAMINE HYDROCHLORIDE 100 MG: 100 INJECTION, SOLUTION INTRAMUSCULAR; INTRAVENOUS at 08:02

## 2023-10-01 RX ADMIN — ERTAPENEM SODIUM 1000 MG: 1 INJECTION INTRAMUSCULAR; INTRAVENOUS at 16:04

## 2023-10-01 RX ADMIN — APIXABAN 5 MG: 5 TABLET, FILM COATED ORAL at 08:02

## 2023-10-01 RX ADMIN — ATORVASTATIN CALCIUM 10 MG: 10 TABLET, FILM COATED ORAL at 08:02

## 2023-10-01 RX ADMIN — MEMANTINE 10 MG: 10 TABLET ORAL at 20:29

## 2023-10-01 NOTE — PROGRESS NOTES
Department of Internal Medicine  Infectious Diseases   Progress  Note      C/C :  Empyema /  leukocytosis     Pt is awake , no distress  Denies fever , denies sob   Afebrile       Current Facility-Administered Medications   Medication Dose Route Frequency Provider Last Rate Last Admin    albuterol (PROVENTIL) (2.5 MG/3ML) 0.083% nebulizer solution 2.5 mg  2.5 mg Nebulization Q12H [place] Merl, APRN - CNP   2.5 mg at 10/01/23 0718    ertapenem (INVanz) 1,000 mg in sodium chloride 0.9 % 50 mL IVPB (mini-bag)  1,000 mg IntraVENous Q24H Tim King  mL/hr at 10/01/23 1604 1,000 mg at 10/01/23 1604    linezolid (ZYVOX) tablet 600 mg  600 mg Oral 2 times per day Tom Shakira King MD   600 mg at 10/01/23 0803    0.9 % sodium chloride infusion   IntraVENous PRN Socrates Khoury MD        metoprolol tartrate (LOPRESSOR) tablet 50 mg  50 mg Oral BID Dot Puga MD   50 mg at 10/01/23 0803    polyethylene glycol (GLYCOLAX) packet 17 g  17 g Oral Daily Katty Hutchinson MD   17 g at 09/30/23 0806    guaiFENesin (ROBITUSSIN) 100 MG/5ML liquid 400 mg  400 mg Oral Q12H Katty Hutchinson MD   400 mg at 10/01/23 1025    thiamine (B-1) injection 100 mg  100 mg IntraVENous Daily Katty Hutchinson MD   100 mg at 10/01/23 0802    white petrolatum ointment   Topical BID Maggie Felty, MD   Given at 10/01/23 0478    And    white petrolatum ointment   Topical TID PRN Maggie Felty, MD        collagenase ointment   Topical Daily Maggie Felty, MD   Given at 10/01/23 0803    valproic acid (DEPAKENE) 250 MG/5ML oral solution 250 mg  250 mg Oral TID Oren Montenegro MD   250 mg at 10/01/23 1514    pantoprazole (PROTONIX) 40 mg in sodium chloride (PF) 0.9 % 10 mL injection  40 mg IntraVENous Q12H Katty Hutchinson MD   40 mg at 10/01/23 0802    glucose chewable tablet 16 g  4 tablet Oral PRN Katty Hutchinson MD        dextrose bolus 10% 125 mL  125 mL IntraVENous PRN Katty Hutchinson MD        Or    dextrose bolus 10% 250 mL PANEL RUBEN Final    levofloxacin Sensitive 1 BACTERIAL SUSCEPTIBILITY PANEL RUBEN Final    meropenem Sensitive <=0.25 BACTERIAL SUSCEPTIBILITY PANEL RUBEN Final    piperacillin-tazobactam Sensitive <=4 BACTERIAL SUSCEPTIBILITY PANEL RUBEN Final    trimethoprim-sulfamethoxazole Resistant >=320 BACTERIAL SUSCEPTIBILITY PANEL RUBEN Final          [facility]        Susceptibility    Klebsiella pneumoniae (1)    Antibiotic Interpretation Microscan Method Status    ceFAZolin Resistant >=64 BACTERIAL SUSCEPTIBILITY PANEL RUBEN Final    cefepime Resistant >=32 BACTERIAL SUSCEPTIBILITY PANEL RUBEN Final    cefotaxime Resistant >=64 BACTERIAL SUSCEPTIBILITY PANEL RUBEN Final    cefOXitin Sensitive <=4 BACTERIAL SUSCEPTIBILITY PANEL RUBEN Final    cefTAZidime Intermediate 16 BACTERIAL SUSCEPTIBILITY PANEL RUBEN Final    ciprofloxacin Sensitive 1 BACTERIAL SUSCEPTIBILITY PANEL RUBEN Final    levofloxacin Sensitive 1 BACTERIAL SUSCEPTIBILITY PANEL RUBEN Final    meropenem Sensitive <=0.25 BACTERIAL SUSCEPTIBILITY PANEL RUBEN Final    piperacillin-tazobactam Sensitive <=4 BACTERIAL SUSCEPTIBILITY PANEL RUBEN Final    trimethoprim-sulfamethoxazole Resistant >=320 BACTERIAL SUSCEPTIBILITY PANEL RUBEN Final               Radiology :      Chest x ray -  Partial interval clearing of right basilar atelectasis and pleural effusion. IMPRESSION:     Right pleural effusion-( exudative ) empyema s/p pig tail drainage ( 9/20)   Leukocytosis - improved     RECOMMENDATIONS:       1. Invanz 1 gram IV q 24 hrs   2. Oral zyvox 600 mg po q 12 hrs   3.  Swallow eval     10/1/2023  Karlee Mayfield MD

## 2023-10-01 NOTE — PROGRESS NOTES
2. Extremely large right pleural effusion with partially loculated anterior hydropneumothorax component, possibly related to superimposed empyema at the anterior right base and extending to the anterior inferior right pulmonary hilum. 3. Complete collapse of the right lung with mild leftward shift of the cardio mediastinum secondary to #2 above. RECOMMENDATIONS: Careful clinical correlation and follow up recommended. CT HEAD WO CONTRAST  Result Date: 9/18/20231. No evidence of acute intracranial hemorrhage or mass effect. 2.  Brain parenchymal volume loss with presumed sequela of chronic small vessel ischemic disease and prior ischemia. XR CHEST PORTABLE Result Date: 9/18/2023  Large right pleural effusion resulting in mild mediastinal deviation to the left. Summary of Events:   80 yo F with PMH of stroke, dementia, atrial fibrillation, PE    Admissions:  7/2-7/7/23 @ [facility] for hypoxia. CTA + PE s/p heparin gtt, transitioned to Eliquis    9/18 [facility] ED AMS, hypoxia. Imaging showed right effusion. Niece refused surgery (VATS). Transferred to ICU  9/20 s/p right pigtail; s/p Dornase alpha and tPA   9/21 s/p Dornase alpha and tPA   9/28: transferred to floors  9/29: 2L NC, 100%.  R pigtail removed; chest x-ray  with clearing right atelectasis and effusion  9/30 on 2lnc   10/1 on 1lnc     Assessment/Plan:  Acute hypoxic respiratory failure, improving   On 1L NC  Wean as able to maintain pulse ox > 90%  Right VRD E. coli Klebsiella empyema 9/24 fluid culture   s/p pigtail 9/20- 9/29/23   S/p Dornase alpha and tPA  On invanz and zyvox per ID  Klebsiella pneumonia 9/26 sputum  Albuterol and cpt  Encourage coughing, deep breathing, activity  Dysphagia  Corpak in place   Will have ST re see patient   Family does not want mother to have PEG   H/o PE  On apixaban   H/o afib  On apixaban   Anemia  Comorbidities: Dementia (on Depakote, Remeron and Aricept), HLD    Electronically signed by DOMINIQUE Hutton - MEREDITH on 10/1/2023 at 9:22 AM

## 2023-10-01 NOTE — PLAN OF CARE
Problem: Discharge Planning  Goal: Discharge to home or other facility with appropriate resources  Outcome: Progressing     Problem: Safety - Adult  Goal: Free from fall injury  Outcome: Progressing     Problem: Skin/Tissue Integrity  Goal: Absence of new skin breakdown  Description: 1. Monitor for areas of redness and/or skin breakdown  2. Assess vascular access sites hourly  3. Every 4-6 hours minimum:  Change oxygen saturation probe site  4. Every 4-6 hours:  If on nasal continuous positive airway pressure, respiratory therapy assess nares and determine need for appliance change or resting period.   Outcome: Progressing     Problem: Pain  Goal: Verbalizes/displays adequate comfort level or baseline comfort level  Outcome: Progressing     Problem: Skin/Tissue Integrity - Adult  Goal: Skin integrity remains intact  Outcome: Progressing     Problem: Skin/Tissue Integrity - Adult  Goal: Incisions, wounds, or drain sites healing without S/S of infection  Outcome: Progressing     Problem: ABCDS Injury Assessment  Goal: Absence of physical injury  Outcome: Progressing

## 2023-10-02 LAB
ANION GAP SERPL CALCULATED.3IONS-SCNC: 14 MMOL/L (ref 7–16)
BASOPHILS # BLD: 0 K/UL (ref 0–0.2)
BASOPHILS NFR BLD: 0 % (ref 0–2)
BUN SERPL-MCNC: 18 MG/DL (ref 6–23)
CALCIUM SERPL-MCNC: 8.8 MG/DL (ref 8.6–10.2)
CHLORIDE SERPL-SCNC: 104 MMOL/L (ref 98–107)
CO2 SERPL-SCNC: 24 MMOL/L (ref 22–29)
CREAT SERPL-MCNC: 0.4 MG/DL (ref 0.5–1)
EOSINOPHIL # BLD: 0.54 K/UL (ref 0.05–0.5)
EOSINOPHILS RELATIVE PERCENT: 4 % (ref 0–6)
ERYTHROCYTE [DISTWIDTH] IN BLOOD BY AUTOMATED COUNT: 21.4 % (ref 11.5–15)
GFR SERPL CREATININE-BSD FRML MDRD: >60 ML/MIN/1.73M2
GLUCOSE BLD-MCNC: 109 MG/DL (ref 74–99)
GLUCOSE BLD-MCNC: 111 MG/DL (ref 74–99)
GLUCOSE BLD-MCNC: 113 MG/DL (ref 74–99)
GLUCOSE BLD-MCNC: 125 MG/DL (ref 74–99)
GLUCOSE BLD-MCNC: 98 MG/DL (ref 74–99)
GLUCOSE SERPL-MCNC: 90 MG/DL (ref 74–99)
HCT VFR BLD AUTO: 36.7 % (ref 34–48)
HGB BLD-MCNC: 10.4 G/DL (ref 11.5–15.5)
LYMPHOCYTES NFR BLD: 1.74 K/UL (ref 1.5–4)
LYMPHOCYTES RELATIVE PERCENT: 14 % (ref 20–42)
MCH RBC QN AUTO: 28.3 PG (ref 26–35)
MCHC RBC AUTO-ENTMCNC: 28.3 G/DL (ref 32–34.5)
MCV RBC AUTO: 99.7 FL (ref 80–99.9)
MONOCYTES NFR BLD: 1.09 K/UL (ref 0.1–0.95)
MONOCYTES NFR BLD: 9 % (ref 2–12)
MYELOCYTES ABSOLUTE COUNT: 0.11 K/UL
MYELOCYTES: 1 %
NEUTROPHILS NFR BLD: 72 % (ref 43–80)
NEUTS SEG NFR BLD: 9.02 K/UL (ref 1.8–7.3)
NUCLEATED RED BLOOD CELLS: 1 PER 100 WBC
PLATELET # BLD AUTO: 433 K/UL (ref 130–450)
PMV BLD AUTO: 10.5 FL (ref 7–12)
POTASSIUM SERPL-SCNC: 4.4 MMOL/L (ref 3.5–5)
RBC # BLD AUTO: 3.68 M/UL (ref 3.5–5.5)
RBC # BLD: ABNORMAL 10*6/UL
SODIUM SERPL-SCNC: 142 MMOL/L (ref 132–146)
WBC OTHER # BLD: 12.5 K/UL (ref 4.5–11.5)

## 2023-10-02 PROCEDURE — 6370000000 HC RX 637 (ALT 250 FOR IP): Performed by: INTERNAL MEDICINE

## 2023-10-02 PROCEDURE — 82962 GLUCOSE BLOOD TEST: CPT

## 2023-10-02 PROCEDURE — 6360000002 HC RX W HCPCS: Performed by: INTERNAL MEDICINE

## 2023-10-02 PROCEDURE — 2580000003 HC RX 258: Performed by: FAMILY MEDICINE

## 2023-10-02 PROCEDURE — 6370000000 HC RX 637 (ALT 250 FOR IP): Performed by: CHIROPRACTOR

## 2023-10-02 PROCEDURE — 6360000002 HC RX W HCPCS: Performed by: NURSE PRACTITIONER

## 2023-10-02 PROCEDURE — A4216 STERILE WATER/SALINE, 10 ML: HCPCS | Performed by: STUDENT IN AN ORGANIZED HEALTH CARE EDUCATION/TRAINING PROGRAM

## 2023-10-02 PROCEDURE — 2060000000 HC ICU INTERMEDIATE R&B

## 2023-10-02 PROCEDURE — 6370000000 HC RX 637 (ALT 250 FOR IP): Performed by: FAMILY MEDICINE

## 2023-10-02 PROCEDURE — C9113 INJ PANTOPRAZOLE SODIUM, VIA: HCPCS | Performed by: STUDENT IN AN ORGANIZED HEALTH CARE EDUCATION/TRAINING PROGRAM

## 2023-10-02 PROCEDURE — 6360000002 HC RX W HCPCS: Performed by: STUDENT IN AN ORGANIZED HEALTH CARE EDUCATION/TRAINING PROGRAM

## 2023-10-02 PROCEDURE — 92610 EVALUATE SWALLOWING FUNCTION: CPT

## 2023-10-02 PROCEDURE — 92526 ORAL FUNCTION THERAPY: CPT

## 2023-10-02 PROCEDURE — 80048 BASIC METABOLIC PNL TOTAL CA: CPT

## 2023-10-02 PROCEDURE — 85025 COMPLETE CBC W/AUTO DIFF WBC: CPT

## 2023-10-02 PROCEDURE — 2580000003 HC RX 258: Performed by: INTERNAL MEDICINE

## 2023-10-02 PROCEDURE — 94640 AIRWAY INHALATION TREATMENT: CPT

## 2023-10-02 PROCEDURE — 2580000003 HC RX 258: Performed by: STUDENT IN AN ORGANIZED HEALTH CARE EDUCATION/TRAINING PROGRAM

## 2023-10-02 PROCEDURE — 6370000000 HC RX 637 (ALT 250 FOR IP): Performed by: STUDENT IN AN ORGANIZED HEALTH CARE EDUCATION/TRAINING PROGRAM

## 2023-10-02 PROCEDURE — 2500000003 HC RX 250 WO HCPCS: Performed by: STUDENT IN AN ORGANIZED HEALTH CARE EDUCATION/TRAINING PROGRAM

## 2023-10-02 PROCEDURE — 36415 COLL VENOUS BLD VENIPUNCTURE: CPT

## 2023-10-02 RX ORDER — ACETAMINOPHEN 160 MG/5ML
650 LIQUID ORAL EVERY 6 HOURS PRN
Status: DISCONTINUED | OUTPATIENT
Start: 2023-10-02 | End: 2023-10-05 | Stop reason: HOSPADM

## 2023-10-02 RX ORDER — ACETAMINOPHEN 650 MG/1
650 SUPPOSITORY RECTAL EVERY 4 HOURS PRN
Status: DISCONTINUED | OUTPATIENT
Start: 2023-10-02 | End: 2023-10-05 | Stop reason: HOSPADM

## 2023-10-02 RX ADMIN — SODIUM CHLORIDE, PRESERVATIVE FREE 10 ML: 5 INJECTION INTRAVENOUS at 20:30

## 2023-10-02 RX ADMIN — ERTAPENEM SODIUM 1000 MG: 1 INJECTION INTRAMUSCULAR; INTRAVENOUS at 15:38

## 2023-10-02 RX ADMIN — ALBUTEROL SULFATE 2.5 MG: 2.5 SOLUTION RESPIRATORY (INHALATION) at 07:38

## 2023-10-02 RX ADMIN — ATORVASTATIN CALCIUM 10 MG: 10 TABLET, FILM COATED ORAL at 08:18

## 2023-10-02 RX ADMIN — ASPIRIN 81 MG CHEWABLE TABLET 81 MG: 81 TABLET CHEWABLE at 08:18

## 2023-10-02 RX ADMIN — POLYETHYLENE GLYCOL 3350 17 G: 17 POWDER, FOR SOLUTION ORAL at 08:21

## 2023-10-02 RX ADMIN — VALPROIC ACID 250 MG: 250 SOLUTION ORAL at 08:17

## 2023-10-02 RX ADMIN — VALPROIC ACID 250 MG: 250 SOLUTION ORAL at 20:30

## 2023-10-02 RX ADMIN — PETROLATUM: 420 OINTMENT TOPICAL at 08:16

## 2023-10-02 RX ADMIN — SODIUM CHLORIDE, PRESERVATIVE FREE 40 MG: 5 INJECTION INTRAVENOUS at 08:21

## 2023-10-02 RX ADMIN — APIXABAN 5 MG: 5 TABLET, FILM COATED ORAL at 20:30

## 2023-10-02 RX ADMIN — SODIUM CHLORIDE, PRESERVATIVE FREE 40 MG: 5 INJECTION INTRAVENOUS at 20:31

## 2023-10-02 RX ADMIN — LINEZOLID 600 MG: 600 TABLET, FILM COATED ORAL at 20:30

## 2023-10-02 RX ADMIN — PETROLATUM: 420 OINTMENT TOPICAL at 20:30

## 2023-10-02 RX ADMIN — SODIUM CHLORIDE, PRESERVATIVE FREE 10 ML: 5 INJECTION INTRAVENOUS at 08:17

## 2023-10-02 RX ADMIN — LINEZOLID 600 MG: 600 TABLET, FILM COATED ORAL at 08:18

## 2023-10-02 RX ADMIN — THIAMINE HYDROCHLORIDE 100 MG: 100 INJECTION, SOLUTION INTRAMUSCULAR; INTRAVENOUS at 08:17

## 2023-10-02 RX ADMIN — GUAIFENESIN 400 MG: 200 SOLUTION ORAL at 00:30

## 2023-10-02 RX ADMIN — MIRTAZAPINE 30 MG: 15 TABLET, FILM COATED ORAL at 20:30

## 2023-10-02 RX ADMIN — GUAIFENESIN 400 MG: 200 SOLUTION ORAL at 11:33

## 2023-10-02 RX ADMIN — APIXABAN 5 MG: 5 TABLET, FILM COATED ORAL at 08:24

## 2023-10-02 RX ADMIN — VALPROIC ACID 250 MG: 250 SOLUTION ORAL at 15:36

## 2023-10-02 RX ADMIN — MEMANTINE 10 MG: 10 TABLET ORAL at 08:17

## 2023-10-02 RX ADMIN — METOPROLOL TARTRATE 50 MG: 50 TABLET ORAL at 20:30

## 2023-10-02 RX ADMIN — METOPROLOL TARTRATE 50 MG: 50 TABLET ORAL at 08:21

## 2023-10-02 RX ADMIN — COLLAGENASE SANTYL: 250 OINTMENT TOPICAL at 08:16

## 2023-10-02 RX ADMIN — ALBUTEROL SULFATE 2.5 MG: 2.5 SOLUTION RESPIRATORY (INHALATION) at 17:41

## 2023-10-02 RX ADMIN — GUAIFENESIN 400 MG: 200 SOLUTION ORAL at 23:27

## 2023-10-02 RX ADMIN — MEMANTINE 10 MG: 10 TABLET ORAL at 20:30

## 2023-10-02 NOTE — PROGRESS NOTES
Department of Internal Medicine  Infectious Diseases   Progress  Note      C/C :  Empyema /  leukocytosis     Pt is awake , no distress  Denies fever , denies sob   Afebrile       Current Facility-Administered Medications   Medication Dose Route Frequency Provider Last Rate Last Admin    acetaminophen (TYLENOL) 160 MG/5ML solution 650 mg  650 mg Oral Q6H PRN Prashant Godoy MD        And    acetaminophen (TYLENOL) suppository 650 mg  650 mg Rectal Q4H PRN Prashant Godoy MD        albuterol (PROVENTIL) (2.5 MG/3ML) 0.083% nebulizer solution 2.5 mg  2.5 mg Nebulization Q12H DOMINIQUE Barfield - CNP   2.5 mg at 10/02/23 0738    ertapenem (INVanz) 1,000 mg in sodium chloride 0.9 % 50 mL IVPB (mini-bag)  1,000 mg IntraVENous Q24H Tim King MD   Stopped at 10/01/23 1635    linezolid (ZYVOX) tablet 600 mg  600 mg Oral 2 times per day Inetta Jairo King MD   600 mg at 10/02/23 0818    0.9 % sodium chloride infusion   IntraVENous PRN Salmabritt Lynn MD        metoprolol tartrate (LOPRESSOR) tablet 50 mg  50 mg Oral BID Tabatha Acevedo MD   50 mg at 10/02/23 0821    polyethylene glycol (GLYCOLAX) packet 17 g  17 g Oral Daily Barbara Brown MD   17 g at 10/02/23 0821    guaiFENesin (ROBITUSSIN) 100 MG/5ML liquid 400 mg  400 mg Oral Q12H Barbara Brown MD   400 mg at 10/02/23 1133    thiamine (B-1) injection 100 mg  100 mg IntraVENous Daily Barbara Brown MD   100 mg at 10/02/23 0817    white petrolatum ointment   Topical BID Jakob Dong MD   Given at 10/02/23 2921    And    white petrolatum ointment   Topical TID PRN Jakob Dong MD        collagenase ointment   Topical Daily Jakob Dong MD   Given at 10/02/23 0816    valproic acid (DEPAKENE) 250 MG/5ML oral solution 250 mg  250 mg Oral TID Bernard Pires MD   250 mg at 10/02/23 0817    pantoprazole (PROTONIX) 40 mg in sodium chloride (PF) 0.9 % 10 mL injection  40 mg IntraVENous Q12H Barbara Brown MD   40 mg at 10/02/23 0821    glucose

## 2023-10-02 NOTE — CARE COORDINATION
10/2/23 Update CM Note. Pt admitted 9/18/23 for Pleural effusion on right. Chest tube removed. IV Zosyn and PO Zyvox per ID. Corpak  in place, ST to see pt. Planning for return to [facility] where pt is along term bed hold. Destination/[place] updated. Ambulance form/ envelope previously placed on chart .   Derek Schwab BSN RN-BC  [phone]

## 2023-10-02 NOTE — PROGRESS NOTES
SPEECH/LANGUAGE PATHOLOGY  CLINICAL ASSESSMENT OF SWALLOWING FUNCTION   and PLAN OF CARE    PATIENT NAME:  Nelida Gutierrez  (female)     MRN:  [id number]    :  1940  (79 y.o.)  STATUS:  Inpatient: Room 7402/7402-A    TODAY'S DATE:  10/2/2023  REFERRING PROVIDER:   DOMINIQUE Mcintyre  SPECIFIC PROVIDER ORDER: SLP eval and treat Date of order:  10/1/23  REASON FOR REFERRAL: \"re evaluate for dysphagia, corpak in place\" per order    EVALUATING THERAPIST: Birda Bosworth, SLP                 RESULTS:    DYSPHAGIA DIAGNOSIS:   Clinical indicators of moderate-severe oral phase dysphagia  and suspected pharyngeal phase dysphagia       DIET RECOMMENDATIONS:  NPO with ongoing PO analysis by SLP only to determine if PO diet can be initiated         FEEDING RECOMMENDATIONS:     Assistance level:  Not applicable      Compensatory strategies recommended: Not applicable      Discussed recommendations with nursing and/or faxed report to referring provider: Yes; discussed rec for NPO at this time and ongoing analysis by SLP w/ pt's nurse. SPEECH THERAPY  PLAN OF CARE   The dysphagia POC is established based on physician order, dysphagia diagnosis and results of clinical assessment     Skilled SLP intervention for dysphagia management on acute care up to 5 x per week until goals met, pt plateaus in function and/or discharged from hospital    Conditions Requiring Skilled Therapeutic Intervention for dysphagia:    Patient is performing below functional baseline d/t  current acute condition, respiratory compromise, multiple medications, and/or increased dependency upon caregivers.   Impaired initiation of the swallow needed verbal cues  Coughing during PO intake    History of dysphagia/altered consistency    patient unable to follow 1 step directions which places them at Higher risk of aspiration Harris Alex., CHAYA Astudillo., & ROBY Teague. 2009.)    Specific dysphagia interventions to include:     ongoing evaluation of swallow function to determine when PO diet can be safely initiated    Specific instructions for next treatment:  ongoing skilled PO analysis to determine if PO diet can be initiated   Patient Treatment Goals:    Short Term Goals:  Pt will participate in ongoing evaluation of swallow function to determine when PO diet can be safely initiated    Long Term Goals:   Pt will maintain adequate nutrition/hydration via PO intake of the least restrictive oral diet with implementation of safe swallow/ compensatory strategies and decrease signs/symptoms of aspiration to less than 1 x/day. Pt will improve oropharyngeal swallow function to ensure airway protection during PO intake to maintain adequate nutrition/hydration and decrease signs/symptoms of aspiration to less than 1 x/day. Patient/family Goal:    Did not state. Will further assess during treatment. Plan of care discussed with Patient   The Patient did not demonstrate complete understanding of the diagnosis, prognosis and plan of care     Rehabilitation Potential/Prognosis: fair                    ADMITTING DIAGNOSIS: Hydropneumothorax [J94.8]  Empyema of lung (HCC) [J86.9]  Septicemia ([address]) [A41.9]  Empyema ([address]) [J86.9]  Pleural effusion on right [J90]    VISIT DIAGNOSIS:   Visit Diagnoses         Codes    Hydropneumothorax     J94.8    Septicemia ([address])     A41.9             PATIENT REPORT/COMPLAINT: patient not able to accurately report  patient currently NPO pending results of this evaluation  RN cleared patient for participation in assessment     yes; RN reported pt presented with coughing outside of PO intake this date but has not received anything by mouth. RN reports core pack placed d/t dysphagia. PRIOR LEVEL OF SWALLOW FUNCTION:    PAST HISTORY OF DYSPHAGIA?: yes; most recently at [facility] during this admission. Please reference pt's chart for further info.      Home diet: Diet information not available     Current Diet Order:  Diet evaluation  Evaluation of Education:  Needs further instruction and No evidence of learning    This plan may be re-evaluated and revised as warranted. Evaluation Time includes thorough review of current medical information, gathering information on past medical history/social history and prior level of function, completion of standardized testing/informal observation of tasks, assessment of data and education on plan of care and goals. [x]The admitting diagnosis and active problem list, have been reviewed prior to initiation of this evaluation. ACTIVE PROBLEM LIST:   Patient Active Problem List   Diagnosis    Essential hypertension    Acute colitis    Nausea and vomiting    Depression    Dementia (HCC)    Cholelithiases    HTN (hypertension)    Leukocytosis    Shingles    Biliary colic    Sepsis ([address])    New onset atrial fibrillation (HCC)    Pleural effusion on right    Empyema of lung (HCC)    Pleural effusion         CPT code:  25800  bedside swallow eval    Tx note (63715): SLP educated pt re: CSE results and current recs of NPO and ongoing analysis by SLP at this time with rationale for such. SLP reviewed pt's dysphagia hx and current medical factors w/ pt; no questions voiced. SLP provided moderate repetition of stimuli to improve following directions, multi modal cues to improve swallow fx with minimal effect this session. Materials discarded and pt left in room w/ callbell w/I reach following session.

## 2023-10-02 NOTE — PROGRESS NOTES
Hospitalist Progress Note      PCP: AMRIK PEACOCK III,     Date of Admission: 9/18/2023        Hospital Course:  SOB< FOUND TO BE HYPOXIC  extremely large right pleural effusion with partially loculated hydropneumothorax with possible superimposed empyema a complete collapse of the right lung with a leftward shift. Patient required pressure support in which case patient was excepted for closer monitoring and further work-up and treatment in the intensive care unit. ** HAS COREPACK   ON ZYVOX AND ZOSYN           Subjective: ASLEEP     Somnolent but indicates she is doing okay  nG-tube in place      Medications:  Reviewed    Infusion Medications    sodium chloride      dextrose      sodium chloride       Scheduled Medications    albuterol  2.5 mg Nebulization Q12H    ertapenem (INVanz) IVPB  1,000 mg IntraVENous Q24H    linezolid  600 mg Oral 2 times per day    metoprolol tartrate  50 mg Oral BID    polyethylene glycol  17 g Oral Daily    guaiFENesin  400 mg Oral Q12H    thiamine  100 mg IntraVENous Daily    white petrolatum   Topical BID    collagenase   Topical Daily    valproic acid  250 mg Oral TID    pantoprazole (PROTONIX) 40 mg in sodium chloride (PF) 0.9 % 10 mL injection  40 mg IntraVENous Q12H    insulin lispro  0-4 Units SubCUTAneous Q6H    apixaban  5 mg Oral BID    aspirin  81 mg Oral Daily    atorvastatin  10 mg Oral Daily    [Held by provider] donepezil  10 mg Oral Nightly    memantine  10 mg Oral BID    mirtazapine  30 mg Oral Nightly    sodium chloride flush  5-40 mL IntraVENous 2 times per day     PRN Meds: acetaminophen **AND** acetaminophen, sodium chloride, white petrolatum **AND** white petrolatum, glucose, dextrose bolus **OR** dextrose bolus, glucagon (rDNA), dextrose, sodium chloride flush, sodium chloride      Intake/Output Summary (Last 24 hours) at 10/2/2023 1831  Last data filed at 10/2/2023 1130  Gross per 24 hour   Intake 0 ml   Output --   Net 0 ml       Exam:    /78 at 12,000  Remains on IV antibiotic therapy at discretion of ID  Can likely wean off oxygen  Okay for discharge from standpoint if okay with others-chest x-ray ordered by pulmonology     ADULT TUBE FEEDING; Nasoenteric; Peptide Based High Protein; Continuous; 10; Yes; 10; Q 4 hours; 55; 100; Q 1 hour; Protein; BID  Code Status: DNR-CCA     PT/OT Eval Status    Electronically signed by Tatyana Ervin MD on 10/2/2023 at 6:31 PM

## 2023-10-03 ENCOUNTER — APPOINTMENT (OUTPATIENT)
Dept: GENERAL RADIOLOGY | Age: 83
End: 2023-10-03
Payer: MEDICARE

## 2023-10-03 LAB
ANION GAP SERPL CALCULATED.3IONS-SCNC: 10 MMOL/L (ref 7–16)
BASOPHILS # BLD: 0 K/UL (ref 0–0.2)
BASOPHILS NFR BLD: 0 % (ref 0–2)
BUN SERPL-MCNC: 19 MG/DL (ref 6–23)
CALCIUM SERPL-MCNC: 8.5 MG/DL (ref 8.6–10.2)
CHLORIDE SERPL-SCNC: 108 MMOL/L (ref 98–107)
CO2 SERPL-SCNC: 23 MMOL/L (ref 22–29)
CREAT SERPL-MCNC: 0.4 MG/DL (ref 0.5–1)
EOSINOPHIL # BLD: 0.46 K/UL (ref 0.05–0.5)
EOSINOPHILS RELATIVE PERCENT: 4 % (ref 0–6)
ERYTHROCYTE [DISTWIDTH] IN BLOOD BY AUTOMATED COUNT: 21.2 % (ref 11.5–15)
GFR SERPL CREATININE-BSD FRML MDRD: >60 ML/MIN/1.73M2
GLUCOSE BLD-MCNC: 102 MG/DL (ref 74–99)
GLUCOSE BLD-MCNC: 110 MG/DL (ref 74–99)
GLUCOSE BLD-MCNC: 110 MG/DL (ref 74–99)
GLUCOSE BLD-MCNC: 112 MG/DL (ref 74–99)
GLUCOSE SERPL-MCNC: 110 MG/DL (ref 74–99)
HCT VFR BLD AUTO: 28.8 % (ref 34–48)
HGB BLD-MCNC: 8.6 G/DL (ref 11.5–15.5)
LYMPHOCYTES NFR BLD: 1.47 K/UL (ref 1.5–4)
LYMPHOCYTES RELATIVE PERCENT: 14 % (ref 20–42)
MCH RBC QN AUTO: 28.3 PG (ref 26–35)
MCHC RBC AUTO-ENTMCNC: 29.9 G/DL (ref 32–34.5)
MCV RBC AUTO: 94.7 FL (ref 80–99.9)
MONOCYTES NFR BLD: 0.64 K/UL (ref 0.1–0.95)
MONOCYTES NFR BLD: 6 % (ref 2–12)
NEUTROPHILS NFR BLD: 75 % (ref 43–80)
NEUTS SEG NFR BLD: 7.92 K/UL (ref 1.8–7.3)
NUCLEATED RED BLOOD CELLS: 1 PER 100 WBC
PLATELET # BLD AUTO: 433 K/UL (ref 130–450)
PMV BLD AUTO: 9.5 FL (ref 7–12)
POTASSIUM SERPL-SCNC: 3.9 MMOL/L (ref 3.5–5)
RBC # BLD AUTO: 3.04 M/UL (ref 3.5–5.5)
RBC # BLD: ABNORMAL 10*6/UL
SODIUM SERPL-SCNC: 141 MMOL/L (ref 132–146)
WBC OTHER # BLD: 10.5 K/UL (ref 4.5–11.5)

## 2023-10-03 PROCEDURE — 36415 COLL VENOUS BLD VENIPUNCTURE: CPT

## 2023-10-03 PROCEDURE — 2580000003 HC RX 258

## 2023-10-03 PROCEDURE — 2580000003 HC RX 258: Performed by: INTERNAL MEDICINE

## 2023-10-03 PROCEDURE — 2500000003 HC RX 250 WO HCPCS: Performed by: STUDENT IN AN ORGANIZED HEALTH CARE EDUCATION/TRAINING PROGRAM

## 2023-10-03 PROCEDURE — 2580000003 HC RX 258: Performed by: FAMILY MEDICINE

## 2023-10-03 PROCEDURE — 85025 COMPLETE CBC W/AUTO DIFF WBC: CPT

## 2023-10-03 PROCEDURE — 80048 BASIC METABOLIC PNL TOTAL CA: CPT

## 2023-10-03 PROCEDURE — 97535 SELF CARE MNGMENT TRAINING: CPT

## 2023-10-03 PROCEDURE — 6360000002 HC RX W HCPCS: Performed by: STUDENT IN AN ORGANIZED HEALTH CARE EDUCATION/TRAINING PROGRAM

## 2023-10-03 PROCEDURE — 36410 VNPNXR 3YR/> PHY/QHP DX/THER: CPT

## 2023-10-03 PROCEDURE — 94640 AIRWAY INHALATION TREATMENT: CPT

## 2023-10-03 PROCEDURE — 97530 THERAPEUTIC ACTIVITIES: CPT

## 2023-10-03 PROCEDURE — 6360000002 HC RX W HCPCS: Performed by: INTERNAL MEDICINE

## 2023-10-03 PROCEDURE — 6370000000 HC RX 637 (ALT 250 FOR IP): Performed by: FAMILY MEDICINE

## 2023-10-03 PROCEDURE — 6370000000 HC RX 637 (ALT 250 FOR IP): Performed by: INTERNAL MEDICINE

## 2023-10-03 PROCEDURE — C9113 INJ PANTOPRAZOLE SODIUM, VIA: HCPCS | Performed by: STUDENT IN AN ORGANIZED HEALTH CARE EDUCATION/TRAINING PROGRAM

## 2023-10-03 PROCEDURE — 6370000000 HC RX 637 (ALT 250 FOR IP): Performed by: CHIROPRACTOR

## 2023-10-03 PROCEDURE — 6360000002 HC RX W HCPCS: Performed by: NURSE PRACTITIONER

## 2023-10-03 PROCEDURE — 05HB33Z INSERTION OF INFUSION DEVICE INTO RIGHT BASILIC VEIN, PERCUTANEOUS APPROACH: ICD-10-PCS | Performed by: INTERNAL MEDICINE

## 2023-10-03 PROCEDURE — 2580000003 HC RX 258: Performed by: STUDENT IN AN ORGANIZED HEALTH CARE EDUCATION/TRAINING PROGRAM

## 2023-10-03 PROCEDURE — 6370000000 HC RX 637 (ALT 250 FOR IP): Performed by: STUDENT IN AN ORGANIZED HEALTH CARE EDUCATION/TRAINING PROGRAM

## 2023-10-03 PROCEDURE — 76937 US GUIDE VASCULAR ACCESS: CPT

## 2023-10-03 PROCEDURE — 2060000000 HC ICU INTERMEDIATE R&B

## 2023-10-03 PROCEDURE — A4216 STERILE WATER/SALINE, 10 ML: HCPCS | Performed by: STUDENT IN AN ORGANIZED HEALTH CARE EDUCATION/TRAINING PROGRAM

## 2023-10-03 PROCEDURE — 99222 1ST HOSP IP/OBS MODERATE 55: CPT

## 2023-10-03 PROCEDURE — 82962 GLUCOSE BLOOD TEST: CPT

## 2023-10-03 PROCEDURE — 71045 X-RAY EXAM CHEST 1 VIEW: CPT

## 2023-10-03 PROCEDURE — C1751 CATH, INF, PER/CENT/MIDLINE: HCPCS

## 2023-10-03 RX ORDER — LINEZOLID 100 MG/5ML
600 GRANULE, FOR SUSPENSION ORAL EVERY 12 HOURS SCHEDULED
Status: DISCONTINUED | OUTPATIENT
Start: 2023-10-03 | End: 2023-10-05 | Stop reason: HOSPADM

## 2023-10-03 RX ORDER — ALBUTEROL SULFATE 2.5 MG/3ML
2.5 SOLUTION RESPIRATORY (INHALATION) EVERY 12 HOURS
Qty: 120 EACH | Refills: 3 | Status: SHIPPED | OUTPATIENT
Start: 2023-10-03

## 2023-10-03 RX ORDER — LINEZOLID 100 MG/5ML
600 GRANULE, FOR SUSPENSION ORAL EVERY 12 HOURS SCHEDULED
Qty: 840 ML | Refills: 0 | Status: SHIPPED | OUTPATIENT
Start: 2023-10-03 | End: 2023-10-17

## 2023-10-03 RX ORDER — SODIUM CHLORIDE 0.9 % (FLUSH) 0.9 %
5-40 SYRINGE (ML) INJECTION PRN
Status: DISCONTINUED | OUTPATIENT
Start: 2023-10-03 | End: 2023-10-05 | Stop reason: HOSPADM

## 2023-10-03 RX ORDER — SODIUM CHLORIDE 0.9 % (FLUSH) 0.9 %
5-40 SYRINGE (ML) INJECTION EVERY 12 HOURS SCHEDULED
Status: DISCONTINUED | OUTPATIENT
Start: 2023-10-03 | End: 2023-10-05 | Stop reason: HOSPADM

## 2023-10-03 RX ORDER — GUAIFENESIN 200 MG/10ML
400 LIQUID ORAL EVERY 12 HOURS
Qty: 400 ML | Refills: 0 | Status: SHIPPED | OUTPATIENT
Start: 2023-10-03 | End: 2023-10-13

## 2023-10-03 RX ORDER — METOPROLOL TARTRATE 50 MG/1
50 TABLET, FILM COATED ORAL 2 TIMES DAILY
Qty: 60 TABLET | Refills: 3 | Status: SHIPPED | OUTPATIENT
Start: 2023-10-03

## 2023-10-03 RX ORDER — SODIUM CHLORIDE 9 MG/ML
INJECTION, SOLUTION INTRAVENOUS PRN
Status: DISCONTINUED | OUTPATIENT
Start: 2023-10-03 | End: 2023-10-05 | Stop reason: HOSPADM

## 2023-10-03 RX ORDER — HEPARIN 100 UNIT/ML
1 SYRINGE INTRAVENOUS PRN
Status: DISCONTINUED | OUTPATIENT
Start: 2023-10-03 | End: 2023-10-05 | Stop reason: HOSPADM

## 2023-10-03 RX ORDER — SODIUM CHLORIDE 9 MG/ML
INJECTION, SOLUTION INTRAVENOUS CONTINUOUS
Status: DISCONTINUED | OUTPATIENT
Start: 2023-10-04 | End: 2023-10-05 | Stop reason: HOSPADM

## 2023-10-03 RX ORDER — HEPARIN 100 UNIT/ML
1 SYRINGE INTRAVENOUS EVERY 12 HOURS SCHEDULED
Status: DISCONTINUED | OUTPATIENT
Start: 2023-10-03 | End: 2023-10-05 | Stop reason: HOSPADM

## 2023-10-03 RX ORDER — VALPROIC ACID 250 MG/5ML
250 SOLUTION ORAL 3 TIMES DAILY
Qty: 300 ML | Refills: 0 | Status: SHIPPED | OUTPATIENT
Start: 2023-10-03

## 2023-10-03 RX ADMIN — SODIUM CHLORIDE, PRESERVATIVE FREE 10 ML: 5 INJECTION INTRAVENOUS at 21:43

## 2023-10-03 RX ADMIN — LINEZOLID 600 MG: 100 SUSPENSION ORAL at 21:42

## 2023-10-03 RX ADMIN — SODIUM CHLORIDE: 9 INJECTION, SOLUTION INTRAVENOUS at 23:58

## 2023-10-03 RX ADMIN — GUAIFENESIN 400 MG: 200 SOLUTION ORAL at 23:04

## 2023-10-03 RX ADMIN — GUAIFENESIN 400 MG: 200 SOLUTION ORAL at 12:00

## 2023-10-03 RX ADMIN — MEMANTINE 10 MG: 10 TABLET ORAL at 10:05

## 2023-10-03 RX ADMIN — SODIUM CHLORIDE, PRESERVATIVE FREE 40 MG: 5 INJECTION INTRAVENOUS at 10:05

## 2023-10-03 RX ADMIN — ALBUTEROL SULFATE 2.5 MG: 2.5 SOLUTION RESPIRATORY (INHALATION) at 07:54

## 2023-10-03 RX ADMIN — POLYETHYLENE GLYCOL 3350 17 G: 17 POWDER, FOR SOLUTION ORAL at 10:06

## 2023-10-03 RX ADMIN — MIRTAZAPINE 30 MG: 15 TABLET, FILM COATED ORAL at 21:40

## 2023-10-03 RX ADMIN — PETROLATUM: 420 OINTMENT TOPICAL at 10:17

## 2023-10-03 RX ADMIN — SODIUM CHLORIDE, PRESERVATIVE FREE 10 ML: 5 INJECTION INTRAVENOUS at 10:06

## 2023-10-03 RX ADMIN — MEMANTINE 10 MG: 10 TABLET ORAL at 21:41

## 2023-10-03 RX ADMIN — PETROLATUM: 420 OINTMENT TOPICAL at 21:44

## 2023-10-03 RX ADMIN — VALPROIC ACID 250 MG: 250 SOLUTION ORAL at 21:41

## 2023-10-03 RX ADMIN — ALBUTEROL SULFATE 2.5 MG: 2.5 SOLUTION RESPIRATORY (INHALATION) at 19:46

## 2023-10-03 RX ADMIN — VALPROIC ACID 250 MG: 250 SOLUTION ORAL at 16:01

## 2023-10-03 RX ADMIN — ASPIRIN 81 MG CHEWABLE TABLET 81 MG: 81 TABLET CHEWABLE at 10:05

## 2023-10-03 RX ADMIN — LINEZOLID 600 MG: 100 SUSPENSION ORAL at 12:00

## 2023-10-03 RX ADMIN — SODIUM CHLORIDE, PRESERVATIVE FREE 40 MG: 5 INJECTION INTRAVENOUS at 21:42

## 2023-10-03 RX ADMIN — COLLAGENASE SANTYL: 250 OINTMENT TOPICAL at 10:16

## 2023-10-03 RX ADMIN — ATORVASTATIN CALCIUM 10 MG: 10 TABLET, FILM COATED ORAL at 10:05

## 2023-10-03 RX ADMIN — VALPROIC ACID 250 MG: 250 SOLUTION ORAL at 10:06

## 2023-10-03 RX ADMIN — METOPROLOL TARTRATE 50 MG: 50 TABLET ORAL at 21:41

## 2023-10-03 RX ADMIN — ERTAPENEM SODIUM 1000 MG: 1 INJECTION INTRAMUSCULAR; INTRAVENOUS at 16:08

## 2023-10-03 RX ADMIN — THIAMINE HYDROCHLORIDE 100 MG: 100 INJECTION, SOLUTION INTRAMUSCULAR; INTRAVENOUS at 10:06

## 2023-10-03 RX ADMIN — METOPROLOL TARTRATE 50 MG: 50 TABLET ORAL at 10:05

## 2023-10-03 RX ADMIN — HEPARIN 100 UNITS: 100 SYRINGE at 21:41

## 2023-10-03 RX ADMIN — APIXABAN 5 MG: 5 TABLET, FILM COATED ORAL at 10:05

## 2023-10-03 ASSESSMENT — PAIN SCALES - GENERAL
PAINLEVEL_OUTOF10: 0

## 2023-10-03 NOTE — PROGRESS NOTES
OCCUPATIONAL THERAPY TREATMENT NOTE    [facility]  OT BEDSIDE TREATMENT NOTE      Date:10/3/2023  Patient Name: Aleksander Chew  MRN: [id number]  : 1940  Room: [address] [place] [address]A     Per OT Eval:  Evaluating OT: WAYNE Brower,  OTR/L; [id number]     Referring Provider: DOMINIQUE Chapman CNP   Specific Provider Orders/Date: OT eval and treat (23)        Diagnosis: Hydropneumothorax [J94.8]  Empyema of lung ([address]) [J86.9]  Septicemia ([address]) [A41.9]  Empyema ([address]) [J86.9]  Pleural effusion on right [J90]      Reason for admission: Pt admitted with pleural effusion, hypotension. Surgery/Procedures: None this admission      Pertinent Medical History:    Past Medical History   History reviewed. No pertinent past medical history. *Precautions:  Fall Risk, Contact isolation, R ashley, R neglect, R inattention, cognition, incontinent, hx dementia/CVA, , NGT     Assessment of current deficits   [x] Functional mobility          [x]ADLs           [x] Strength                  [x]Cognition   [x] Functional transfers        [x] IADLs         [x] Safety Awareness   [x]Endurance   [x] Fine Coordination           [x] ROM           [x] Vision/perception    []Sensation     [x]Gross Motor Coordination [x] Balance    [x] Delirium                  []Motor Control     [x] Communication     OT PLAN OF CARE   OT POC based on physician orders, patient diagnosis and results of clinical assessment.         Frequency/Duration: 1-3 days/wk for 1-2 weeks PRN    Specific OT Treatment Interventions to include:   * Instruction/training on adapted ADL techniques and AE recommendations to increase functional independence within precautions       * Training on energy conservation strategies, correct breathing pattern and techniques to improve independence/tolerance for self-care routine  * Functional transfer/mobility training/DME recommendations for increased independence, safety, and fall prevention  * supine:   Max A Supine to sit: Max A     Sit to supine:   Max A                     Min A      Functional Transfers Sit to stand:   NT     Stand to sit:   NT     Stand Pivot:   NT Sit to stand:   NT     Stand to sit:   NT     Stand Pivot:   NT                     Mod A      Functional Mobility NT NT                     Mod A         Balance Sitting:     Static: Min A    Dynamic:Mod A  Standing: NT Sitting:     Static: Max A    Dynamic: Max A  Standing: NT Sitting:     Static: Supervision    Dynamic: Min A  Standing: Mod A                   Endurance/Activity Tolerance    Fair- tolerance with light activity. Poor+ tolerance with light activity. WFL  For full ADL   Visual/  Perceptual Limited  R neglect- cues to attend to R side. Comments:  Cleared by RN to see pt. Upon arrival patient supine in bed and agreeable to OT session w/ family in room. At end of session, patient supine in bed with HOB >30 degrees, +alarms, call light and phone within reach, all lines and tubes intact. Overall patient demonstrated decreased independence, activity tolerance, and safety during completion of ADL/mobility tasks. Therapist facilitated ADL tasks & bed mobility to address safety awareness, implementation of fall prevention strategies, & safety awareness throughout daily activities. Pt would benefit from continued skilled OT to increase safety and independence with completion of ADL/IADL tasks for functional independence and quality of life. Treatment: Required re-orientation to year/month/place. Pt required mod vc's for proper technique/safety with hand placement/body mechanics/posture for bed mobility/ADLs. Pt required mod vc's for sequencing/initiation of ADLs/bed mobility w/ increased assist to maintain skin/joint integrity & proper body mechanics. Pt able to sit EOB ~5 mins to increase core strength/balance/activity tolerance for ease with ADLs.  Pt required increased time to complete ADLs/functional transfers due to management of multiple lines. Pt required rest breaks during session d/t deconditioning. Pt instructed on use of call light for assistance and fall prevention. Pt demo'ing poor understanding of education provided. Continue to educate. Pt has made slow progress towards set goals.    Continue with current plan of care      Treatment Time In:9:00a            Treatment Time Out: 9:38a                Treatment Charges: Mins Units   Ther Ex  86779     Manual Therapy 02507     Thera Activities 94795 15 1   ADL/Home Mgt 76983 23 2   Neuro Re-ed 80695     Group Therapy      Orthotic manage/training  71048     Non-Billable Time     Total Timed Treatment 38 3         Alexander Marshall OTR/L; [id number]

## 2023-10-03 NOTE — PROGRESS NOTES
General surgery consult called to [address] per perfect serv patient added to census. Resident [facility] notified per perfect serv and added to census.

## 2023-10-03 NOTE — CARE COORDINATION
10/3/23 Update CM Note. Pt admitted 9/18/23 for Pleural effusion on right. On IV Invanz. She is NPO with Corpak for tubefeed. SLP following. Need long term nutrition plan. Planning for return to [facility] where pt is along term bed hold. Destination/DARRION updated. Ambulance form/ envelope previously placed on chart . Brown MONTES RN-BC  [phone] 1450 Addendum: Discharge order noted. Call placed to Angelica to notify of discharge. Questions about PEG. States she was understanding PEG was  going to be placed prior to discharge. Discharge on hold.

## 2023-10-03 NOTE — PROGRESS NOTES
Physician Progress Note      PATIENT:               Zoran Lai  [facility] #:                  [id number]  :                       1940  ADMIT DATE:       2023 11:02 AM  [facility] DATE:  RESPONDING  PROVIDER #:        Quoc Chavez MD          QUERY TEXT:    Patient admitted with Sepsis with septic shock adn emypema with pleural fluid   culture positive for VRE, E. coli & Klebsiella pneumoniae. If possible,   please document in progress notes and discharge summary the source of sepsis:    The medical record reflects the following:  Risk Factors: empyema with pleural fluid culture positive for VRE, E. coli &   Klebsiella  Clinical Indicators: empyema with pleural fluid culture positive for VRE, E.   coli & Klebsiella; CTA chest: 2. Extremely large right pleural effusion with   partially loculated anterior hydropneumothorax component, possibly related to   superimposed empyema at the anterior right base and extending to the anterior   inferior right pulmonary hilum. 3. Complete collapse of the right lung with   mild leftward shift of the cardio mediastinum secondary to #2 above. Treatment: Pulmonology, Critical Care, Cardiothoracic & ID consults, right   sided chest tube, BC, Pleural fluid cx, IV Levophed, IV Vanco, IV Maxipime x 1   dose, IV Zyvox, IV Zosyn, labs and monitoring    Thank you,  Tiana Crandall   Clinical Documentation Improvement Specialist  W: [phone]  Options provided:  -- Sepsis due to VRE, E. coli & klebsiella pneumoniae positive empyema  -- Sepsis due to, please specify, Please specify sepsis specificity. -- Other - I will add my own diagnosis  -- Disagree - Not applicable / Not valid  -- Disagree - Clinically unable to determine / Unknown  -- Refer to Clinical Documentation Reviewer    PROVIDER RESPONSE TEXT:    Sepsis due to VRE, E. coli & Klebsiella pneumoniae positive empyema.     Query created by: Tiana Crandall on 2023 10:30 AM      Electronically signed by:  Quoc Chavez MD

## 2023-10-03 NOTE — CONSULTS
Palliative Care Department  [phone]  Palliative Care Initial Consult  Provider Krys Paul, DOMINIQUE - MEREDITH      PATIENT: Karyn Harris  : 1940  MRN: [id number]  ADMISSION DATE: 2023 11:02 AM  Referring Provider: Katie Gill MD    Palliative Medicine was consulted on hospital day 15 for assistance with Goals of care  HPI:     Marcell Goltz is a 79 y.o. y/o female with no significant medical history on record who presented to [facility]) on 2023 from nursing home with shortness of breath. Found to have large right pleural effusion with partial loculated hydropneumothorax with possible superimposed empyema a complete collapse of the right lung with a left work shift in the emergency room she was found to be tachycardic and hypotensive, requiring Levophed and Cardizem drip. Cardiothoracic evaluated patient for possible VATS, family/POA declined. s/p right lung pigtail placed  - . S/p Dornase alpha and tPA. ID following, patient on Invanz and Zyvox due to VRD E. coli Klebsiella empyema on fluid cultures. ASSESSMENT/PLAN:     Pertinent Hospital Diagnoses     Hypoxic respiratory failure  Right pleural effusion  Empyema  Pleuritic fluid culture positive for VRD E. coli Klebsiella empyema  Dysphagia    Palliative Care Encounter / Counseling Regarding Goals of Care  Please see detailed goals of care discussion as below  At this time, Karyn Harris, Does Not have capacity for medical decision-making.   Capacity is time limited and situation/question specific  During encounter Angelica was surrogate medical decision-maker  Outcome of goals of care meeting:  Family not wanting PEG tube, however okay for patient to return back to nursing home with Corpak, and be evaluated again there for swallowing  Code status established limited nodule    Code status Limited no to all heroic efforts  Advanced Directives: no POA or living will in [facility]  Surrogate/Legal NOK:  Yenifer Del Rio normoactive bowel sounds, soft, non-tender  Skin: warm, dry without rashes, lesions, bruising  Neuro: awake, alert, oriented to self    Objective data reviewed: labs, images, records, medication use, vitals, and chart    Time/Communication  Greater than 50% of time spent, total 55 minutes in counseling and coordination of care at the bedside regarding goals of care. Thank you for allowing Palliative Medicine to participate in the care of [facility]. Note: This report was completed using opinions.h voiced recognition software. Every effort has been made to ensure accuracy; however, inadvertent computerized transcription errors may be present.

## 2023-10-03 NOTE — PROGRESS NOTES
CHG single lumen power midline Placement 10/3/2023    Product number: [id number]   Lot Number: [id number]      Ultrasound: yes   Right Basilic vein:                Upper Arm Circumference: (CM) 25    Size:(FR)/GUAGE 4.5/15 cm    Exposed Length: (CM) 1    Internal Length: (CM) 14   Cut: (CM) 0   Vein Measurement: 0.58 cm              Lidocaine Given: yes lidocaine 1%    Lucille Curiel RN  10/3/2023  3:27 PM

## 2023-10-03 NOTE — PROGRESS NOTES
Spoke to CM who was advised by patient family that they wish to proceed with peg placement, spoke to [facility] [employer] NP who verified they wished to proceed with placement.  [address] MAGGIE notified     Risa Odom RN  10/03/23  3:16 PM

## 2023-10-03 NOTE — PROGRESS NOTES
Hospitalist Progress Note      PCP: AMRIK PEACOCK III,     Date of Admission: 9/18/2023        Hospital Course:  SOB< FOUND TO BE HYPOXIC  extremely large right pleural effusion with partially loculated hydropneumothorax with possible superimposed empyema a complete collapse of the right lung with a leftward shift. Patient required pressure support in which case patient was excepted for closer monitoring and further work-up and treatment in the intensive care unit. ** HAS COREPACK   ON ZYVOX AND ZOSYN           Subjective: ASLEEP     Somnolent but indicates she is doing okay  nG-tube in place      Medications:  Reviewed    Infusion Medications    sodium chloride      sodium chloride      dextrose      sodium chloride       Scheduled Medications    linezolid  600 mg Oral 2 times per day    lidocaine  5 mL IntraDERmal Once    sodium chloride flush  5-40 mL IntraVENous 2 times per day    heparin flush  1 mL IntraVENous 2 times per day    albuterol  2.5 mg Nebulization Q12H    ertapenem (INVanz) IVPB  1,000 mg IntraVENous Q24H    metoprolol tartrate  50 mg Oral BID    polyethylene glycol  17 g Oral Daily    guaiFENesin  400 mg Oral Q12H    thiamine  100 mg IntraVENous Daily    white petrolatum   Topical BID    collagenase   Topical Daily    valproic acid  250 mg Oral TID    pantoprazole (PROTONIX) 40 mg in sodium chloride (PF) 0.9 % 10 mL injection  40 mg IntraVENous Q12H    insulin lispro  0-4 Units SubCUTAneous Q6H    apixaban  5 mg Oral BID    aspirin  81 mg Oral Daily    atorvastatin  10 mg Oral Daily    [Held by provider] donepezil  10 mg Oral Nightly    memantine  10 mg Oral BID    mirtazapine  30 mg Oral Nightly    sodium chloride flush  5-40 mL IntraVENous 2 times per day     PRN Meds: sodium chloride flush, sodium chloride, heparin flush, acetaminophen **AND** acetaminophen, sodium chloride, white petrolatum **AND** white petrolatum, glucose, dextrose bolus **OR** dextrose bolus, glucagon (rDNA), liter O2  Corpak with tube feed  WBC 11.6  Glucose levels well controlled  Speech eval in am  Vital signs stable    10/2/2023  Patient with exudative right pleural effusion/empyema with pigtsil drain in place  White count still elevated at 12,000  Remains on IV antibiotic therapy at discretion of ID  Can likely wean off oxygen  Okay for discharge from standpoint if okay with others-chest x-ray ordered by pulmonology    10/3/2023  White count resolved-Case discussed with Dr. Muriel Jackson, continue antibiotics for 14 days with Invanz every 24 and oral Zyvox every 12 hours  Family does not wish to have PEG placement  As such okay for discharge from medicine standpoint to nursing facility with Corpak in place  If Klaudia Leigha is not able to be removed, patient will have to move forward with pleasure feeds/hospice-since family does not wish to place PEG tube     ADULT TUBE FEEDING; Nasoenteric; Peptide Based High Protein; Continuous; 10; Yes; 10; Q 4 hours; 55; 100; Q 1 hour; Protein; BID  Code Status: DNR-CCA     PT/OT Eval Status    Electronically signed by Roark Canavan, MD on 10/3/2023 at 1:42 PM

## 2023-10-03 NOTE — PROGRESS NOTES
Message received by charge nurse about patient's POA now considering PEG tube. Spoke with POA and she has spoke with  and decided to move forward with permanent tube feed. Charge nurse notified.

## 2023-10-03 NOTE — PROGRESS NOTES
Department of Internal Medicine  Infectious Diseases   Progress  Note      C/C :  Empyema /  leukocytosis     Pt is awake , no distress  Denies fever , denies sob   Afebrile       Current Facility-Administered Medications   Medication Dose Route Frequency Provider Last Rate Last Admin    linezolid (ZYVOX) 100 MG/5ML suspension 600 mg  600 mg Oral 2 times per day Ceferino Ayala MD   600 mg at 10/03/23 1200    acetaminophen (TYLENOL) 160 MG/5ML solution 650 mg  650 mg Oral Q6H PRN Danielle Green MD        And    acetaminophen (TYLENOL) suppository 650 mg  650 mg Rectal Q4H PRN Danielle Green MD        albuterol (PROVENTIL) (2.5 MG/3ML) 0.083% nebulizer solution 2.5 mg  2.5 mg Nebulization Q12H DOMINIQUE Irwin - CNP   2.5 mg at 10/03/23 0754    ertapenem (INVanz) 1,000 mg in sodium chloride 0.9 % 50 mL IVPB (mini-bag)  1,000 mg IntraVENous Q24H Tim King MD   Stopped at 10/02/23 1630    0.9 % sodium chloride infusion   IntraVENous PRN Malcom Fuentes MD        metoprolol tartrate (LOPRESSOR) tablet 50 mg  50 mg Oral BID Tabatha Acevedo MD   50 mg at 10/03/23 1005    polyethylene glycol (GLYCOLAX) packet 17 g  17 g Oral Daily Zhou Perry MD   17 g at 10/03/23 1006    guaiFENesin (ROBITUSSIN) 100 MG/5ML liquid 400 mg  400 mg Oral Q12H Zhou Perry MD   400 mg at 10/03/23 1200    thiamine (B-1) injection 100 mg  100 mg IntraVENous Daily Zhou Perry MD   100 mg at 10/03/23 1006    white petrolatum ointment   Topical BID Mike Olea MD   Given at 10/03/23 1017    And    white petrolatum ointment   Topical TID PRN Mike Olea MD        collagenase ointment   Topical Daily Mike Olea MD   Given at 10/03/23 1016    valproic acid (DEPAKENE) 250 MG/5ML oral solution 250 mg  250 mg Oral TID Narcisa Hahn MD   250 mg at 10/03/23 1006    pantoprazole (PROTONIX) 40 mg in sodium chloride (PF) 0.9 % 10 mL injection  40 mg IntraVENous Q12H Zhou Perry MD   40 mg at 10/03/23 1005

## 2023-10-03 NOTE — CONSULTS
GENERAL SURGERY  CONSULT NOTE  10/3/2023    Physician Consulted: Dr. Caryn Steve  Reason for Consult: PEG  Referring Physician: Dr. Jb Grant is a 79 y.o. female who presents for evaluation of PEG placement. Patient admitted at the end of September for pleural effusion and empyema. Patient subsequently had pigtail catheter placed but was intubated in the ICU for acute respiratory failure. Patient has improved and has been extubated and transferred out of the ICU. Prior to admission patient was eating a dysphagia diet with nectar consistency liquids. Since admission patient has had poor swallowing and is recently failed to bedside swallow tests. Patient currently has Corpak in place for tube feeds. Palliative care spoke with patient and POA and they would like to move forward with PEG placement at this time. Patient has a history of A-fib on Eliquis, as well as dementia. She is overall a poor historian and was not oriented on my exam.      History reviewed. No pertinent past medical history. Past Surgical History:   Procedure Laterality Date    IR PERC CATH PLEURAL DRAIN W/IMAG  9/20/2023    IR PERC CATH PLEURAL DRAIN W/IMAG 9/20/2023 Jada Vallecillo MD [facility] PROCEDURES       Medications Prior to Admission:    Prior to Admission medications    Medication Sig Start Date End Date Taking?  Authorizing Provider   linezolid (ZYVOX) 100 MG/5ML suspension Take 30 mLs by mouth every 12 hours for 14 days 10/3/23 10/17/23 Yes Tim King MD   ertapenem (INVANZ) infusion Infuse 1,000 mg intravenously every 24 hours for 14 days For 2 weeks 10/3/23 10/17/23 Yes Tim King MD   metoprolol tartrate (LOPRESSOR) 50 MG tablet Take 1 tablet by mouth 2 times daily 10/3/23  Yes Adarsh Santillan MD   valproic acid (DEPAKENE) 250 MG/5ML SOLN oral solution Take 5 mLs by mouth in the morning, at noon, and at bedtime 10/3/23  Yes Adarsh Santillan MD   albuterol (PROVENTIL) (2.5 MG/3ML) 0.083% nebulizer solution Take TECHNOLOGIST PROVIDED HISTORY: Reason for exam:->r/o PE What reading provider will be dictating this exam?->CRC FINDINGS: Pulmonary Arteries: Pulmonary arteries are adequately opacified for evaluation. No evidence of intraluminal filling defect to suggest pulmonary embolism. Motion artifact limits evaluation of the subsegmental left basilar pulmonary arteries. Main pulmonary artery is normal in caliber. Mediastinum: No evidence of mediastinal lymphadenopathy. The heart and pericardium demonstrate no acute abnormality. There is no acute abnormality of the thoracic aorta. Supra-aortic trunks are widely patent. Lungs/pleura: Extremely large right pleural effusion with partially loculated anterior hydropneumothorax component, possibly related to superimposed empyema at the anterior right base and extending to the anterior inferior right pulmonary hilum. Complete collapse of the right lung. Mild leftward shift of the cardio mediastinum. Upper Abdomen: Cholelithiasis. Otherwise, limited images of the upper abdomen are unremarkable. Soft Tissues/Bones: No acute bone or soft tissue abnormality. 1. No evidence of pulmonary embolism. 2. Extremely large right pleural effusion with partially loculated anterior hydropneumothorax component, possibly related to superimposed empyema at the anterior right base and extending to the anterior inferior right pulmonary hilum. 3. Complete collapse of the right lung with mild leftward shift of the cardio mediastinum secondary to #2 above. RECOMMENDATIONS: Careful clinical correlation and follow up recommended.      CT HEAD WO CONTRAST    Result Date: 9/18/2023  EXAMINATION: CT OF THE HEAD WITHOUT CONTRAST  9/18/2023 2:41 pm TECHNIQUE: CT of the head was performed without the administration of intravenous contrast. Automated exposure control, iterative reconstruction, and/or weight based adjustment of the mA/kV was utilized to reduce the radiation dose to as low as reasonably

## 2023-10-04 ENCOUNTER — ANESTHESIA EVENT (OUTPATIENT)
Dept: ENDOSCOPY | Age: 83
End: 2023-10-04
Payer: MEDICARE

## 2023-10-04 ENCOUNTER — ANESTHESIA (OUTPATIENT)
Dept: ENDOSCOPY | Age: 83
End: 2023-10-04
Payer: MEDICARE

## 2023-10-04 LAB
GLUCOSE BLD-MCNC: 108 MG/DL (ref 74–99)
GLUCOSE BLD-MCNC: 76 MG/DL (ref 74–99)
GLUCOSE BLD-MCNC: 77 MG/DL (ref 74–99)
GLUCOSE BLD-MCNC: 81 MG/DL (ref 74–99)

## 2023-10-04 PROCEDURE — 6360000002 HC RX W HCPCS: Performed by: STUDENT IN AN ORGANIZED HEALTH CARE EDUCATION/TRAINING PROGRAM

## 2023-10-04 PROCEDURE — 3700000001 HC ADD 15 MINUTES (ANESTHESIA): Performed by: SURGERY

## 2023-10-04 PROCEDURE — 3609013300 HC EGD TUBE PLACEMENT: Performed by: SURGERY

## 2023-10-04 PROCEDURE — 43246 EGD PLACE GASTROSTOMY TUBE: CPT | Performed by: SURGERY

## 2023-10-04 PROCEDURE — 2060000000 HC ICU INTERMEDIATE R&B

## 2023-10-04 PROCEDURE — 6360000002 HC RX W HCPCS: Performed by: INTERNAL MEDICINE

## 2023-10-04 PROCEDURE — 6370000000 HC RX 637 (ALT 250 FOR IP): Performed by: INTERNAL MEDICINE

## 2023-10-04 PROCEDURE — 2580000003 HC RX 258: Performed by: STUDENT IN AN ORGANIZED HEALTH CARE EDUCATION/TRAINING PROGRAM

## 2023-10-04 PROCEDURE — C9113 INJ PANTOPRAZOLE SODIUM, VIA: HCPCS | Performed by: STUDENT IN AN ORGANIZED HEALTH CARE EDUCATION/TRAINING PROGRAM

## 2023-10-04 PROCEDURE — 2580000003 HC RX 258

## 2023-10-04 PROCEDURE — 6370000000 HC RX 637 (ALT 250 FOR IP): Performed by: STUDENT IN AN ORGANIZED HEALTH CARE EDUCATION/TRAINING PROGRAM

## 2023-10-04 PROCEDURE — 2709999900 HC NON-CHARGEABLE SUPPLY: Performed by: SURGERY

## 2023-10-04 PROCEDURE — 7100000000 HC PACU RECOVERY - FIRST 15 MIN: Performed by: SURGERY

## 2023-10-04 PROCEDURE — 7100000001 HC PACU RECOVERY - ADDTL 15 MIN: Performed by: SURGERY

## 2023-10-04 PROCEDURE — 6360000002 HC RX W HCPCS

## 2023-10-04 PROCEDURE — A4216 STERILE WATER/SALINE, 10 ML: HCPCS | Performed by: STUDENT IN AN ORGANIZED HEALTH CARE EDUCATION/TRAINING PROGRAM

## 2023-10-04 PROCEDURE — 0DH63UZ INSERTION OF FEEDING DEVICE INTO STOMACH, PERCUTANEOUS APPROACH: ICD-10-PCS | Performed by: SURGERY

## 2023-10-04 PROCEDURE — 3700000000 HC ANESTHESIA ATTENDED CARE: Performed by: SURGERY

## 2023-10-04 PROCEDURE — 2500000003 HC RX 250 WO HCPCS: Performed by: STUDENT IN AN ORGANIZED HEALTH CARE EDUCATION/TRAINING PROGRAM

## 2023-10-04 PROCEDURE — 6360000002 HC RX W HCPCS: Performed by: NURSE PRACTITIONER

## 2023-10-04 PROCEDURE — 2580000003 HC RX 258: Performed by: FAMILY MEDICINE

## 2023-10-04 PROCEDURE — 6370000000 HC RX 637 (ALT 250 FOR IP): Performed by: CHIROPRACTOR

## 2023-10-04 PROCEDURE — 3E0G76Z INTRODUCTION OF NUTRITIONAL SUBSTANCE INTO UPPER GI, VIA NATURAL OR ARTIFICIAL OPENING: ICD-10-PCS | Performed by: SURGERY

## 2023-10-04 PROCEDURE — 82962 GLUCOSE BLOOD TEST: CPT

## 2023-10-04 PROCEDURE — 94640 AIRWAY INHALATION TREATMENT: CPT

## 2023-10-04 PROCEDURE — 2580000003 HC RX 258: Performed by: INTERNAL MEDICINE

## 2023-10-04 PROCEDURE — 6370000000 HC RX 637 (ALT 250 FOR IP): Performed by: FAMILY MEDICINE

## 2023-10-04 RX ORDER — LIDOCAINE HYDROCHLORIDE 20 MG/ML
INJECTION, SOLUTION INTRAVENOUS PRN
Status: DISCONTINUED | OUTPATIENT
Start: 2023-10-04 | End: 2023-10-04 | Stop reason: SDUPTHER

## 2023-10-04 RX ORDER — PROPOFOL 10 MG/ML
INJECTION, EMULSION INTRAVENOUS PRN
Status: DISCONTINUED | OUTPATIENT
Start: 2023-10-04 | End: 2023-10-04 | Stop reason: SDUPTHER

## 2023-10-04 RX ORDER — FENTANYL CITRATE 50 UG/ML
INJECTION, SOLUTION INTRAMUSCULAR; INTRAVENOUS PRN
Status: DISCONTINUED | OUTPATIENT
Start: 2023-10-04 | End: 2023-10-04 | Stop reason: SDUPTHER

## 2023-10-04 RX ORDER — CEFAZOLIN SODIUM 1 G/3ML
INJECTION, POWDER, FOR SOLUTION INTRAMUSCULAR; INTRAVENOUS PRN
Status: DISCONTINUED | OUTPATIENT
Start: 2023-10-04 | End: 2023-10-04 | Stop reason: SDUPTHER

## 2023-10-04 RX ADMIN — SODIUM CHLORIDE, PRESERVATIVE FREE 10 ML: 5 INJECTION INTRAVENOUS at 08:15

## 2023-10-04 RX ADMIN — GUAIFENESIN 400 MG: 200 SOLUTION ORAL at 23:34

## 2023-10-04 RX ADMIN — PETROLATUM: 420 OINTMENT TOPICAL at 08:14

## 2023-10-04 RX ADMIN — HEPARIN 100 UNITS: 100 SYRINGE at 20:40

## 2023-10-04 RX ADMIN — PROPOFOL 50 MG: 10 INJECTION, EMULSION INTRAVENOUS at 14:41

## 2023-10-04 RX ADMIN — LIDOCAINE HYDROCHLORIDE 50 MG: 20 INJECTION, SOLUTION INTRAVENOUS at 14:41

## 2023-10-04 RX ADMIN — PETROLATUM: 420 OINTMENT TOPICAL at 20:41

## 2023-10-04 RX ADMIN — SODIUM CHLORIDE, PRESERVATIVE FREE 10 ML: 5 INJECTION INTRAVENOUS at 20:40

## 2023-10-04 RX ADMIN — LINEZOLID 600 MG: 100 SUSPENSION ORAL at 20:39

## 2023-10-04 RX ADMIN — LINEZOLID 600 MG: 100 SUSPENSION ORAL at 08:13

## 2023-10-04 RX ADMIN — METOPROLOL TARTRATE 50 MG: 50 TABLET ORAL at 08:12

## 2023-10-04 RX ADMIN — FENTANYL CITRATE 50 MCG: 50 INJECTION, SOLUTION INTRAMUSCULAR; INTRAVENOUS at 14:41

## 2023-10-04 RX ADMIN — MIRTAZAPINE 30 MG: 15 TABLET, FILM COATED ORAL at 20:39

## 2023-10-04 RX ADMIN — VALPROIC ACID 250 MG: 250 SOLUTION ORAL at 16:32

## 2023-10-04 RX ADMIN — POLYETHYLENE GLYCOL 3350 17 G: 17 POWDER, FOR SOLUTION ORAL at 08:14

## 2023-10-04 RX ADMIN — COLLAGENASE SANTYL: 250 OINTMENT TOPICAL at 08:15

## 2023-10-04 RX ADMIN — ALBUTEROL SULFATE 2.5 MG: 2.5 SOLUTION RESPIRATORY (INHALATION) at 06:17

## 2023-10-04 RX ADMIN — GUAIFENESIN 400 MG: 200 SOLUTION ORAL at 11:46

## 2023-10-04 RX ADMIN — ATORVASTATIN CALCIUM 10 MG: 10 TABLET, FILM COATED ORAL at 08:12

## 2023-10-04 RX ADMIN — ALBUTEROL SULFATE 2.5 MG: 2.5 SOLUTION RESPIRATORY (INHALATION) at 18:06

## 2023-10-04 RX ADMIN — SODIUM CHLORIDE: 9 INJECTION, SOLUTION INTRAVENOUS at 14:53

## 2023-10-04 RX ADMIN — THIAMINE HYDROCHLORIDE 100 MG: 100 INJECTION, SOLUTION INTRAMUSCULAR; INTRAVENOUS at 08:13

## 2023-10-04 RX ADMIN — HEPARIN 100 UNITS: 100 SYRINGE at 08:14

## 2023-10-04 RX ADMIN — VALPROIC ACID 250 MG: 250 SOLUTION ORAL at 08:13

## 2023-10-04 RX ADMIN — SODIUM CHLORIDE, PRESERVATIVE FREE 40 MG: 5 INJECTION INTRAVENOUS at 08:13

## 2023-10-04 RX ADMIN — VALPROIC ACID 250 MG: 250 SOLUTION ORAL at 20:39

## 2023-10-04 RX ADMIN — SODIUM CHLORIDE, PRESERVATIVE FREE 40 MG: 5 INJECTION INTRAVENOUS at 20:39

## 2023-10-04 RX ADMIN — ERTAPENEM SODIUM 1000 MG: 1 INJECTION INTRAMUSCULAR; INTRAVENOUS at 16:36

## 2023-10-04 RX ADMIN — MEMANTINE 10 MG: 10 TABLET ORAL at 20:39

## 2023-10-04 RX ADMIN — MEMANTINE 10 MG: 10 TABLET ORAL at 08:12

## 2023-10-04 RX ADMIN — SODIUM CHLORIDE: 9 INJECTION, SOLUTION INTRAVENOUS at 11:46

## 2023-10-04 RX ADMIN — CEFAZOLIN 2 G: 1 INJECTION, POWDER, FOR SOLUTION INTRAMUSCULAR; INTRAVENOUS at 14:46

## 2023-10-04 RX ADMIN — METOPROLOL TARTRATE 50 MG: 50 TABLET ORAL at 20:39

## 2023-10-04 ASSESSMENT — ENCOUNTER SYMPTOMS: SHORTNESS OF BREATH: 1

## 2023-10-04 ASSESSMENT — PAIN SCALES - WONG BAKER
WONGBAKER_NUMERICALRESPONSE: 0

## 2023-10-04 ASSESSMENT — PAIN SCALES - GENERAL: PAINLEVEL_OUTOF10: 0

## 2023-10-04 NOTE — CARE COORDINATION
10/4/23 Update CM Note. Pt admitted 9/18/23 for Pleural effusion on right. Scheduled for PEG today. Planning for return to [facility] when medically stable. Pt is along term bed hold. Destination/DARRION updated. Ambulance form/ envelope previously placed on chart .   Star HALLN RN-BC  [phone]

## 2023-10-04 NOTE — PROGRESS NOTES
Speech Language Pathology  NAME:  Fadi Zuleta  :  1940  DATE: 10/4/2023  ROOM:  7402/7402-A    Pt unavailable at 10:30 AM for Dysphagia therapy services due to:    Patient NPO for procedure not able to address dysphagia goals due to this. Per chart review and nursing staff, pt NPO for PEG placement this date. Will re-attempt as appropriate. Thank you.

## 2023-10-04 NOTE — PROGRESS NOTES
Comprehensive Nutrition Assessment    Type and Reason for Visit:  Reassess    Nutrition Recommendations/Plan:   Continue NPO. Re-Start EN once med appropriate and monitor. (Previous EN orders not recommended d/t exceeds estimated protein needs at this time as well as specialty formula not indicated currently). TF Recommendations:  Diabetic (Glucerna 1.5) @ 45ml/hr (Goal) Continuous x 24hrs/d= 1080ml TV, 1620kcal, 89gm Pro, 820ml free water. Flush per critical care mgmt; however if needed, recommend 150ml flush q 6h= 1420ml total water (TF+Flush). Malnutrition Assessment:  Malnutrition Status:  Insufficient data (09/21/23 1301)    Context:  Acute Illness     Findings of the 6 clinical characteristics of malnutrition:  Energy Intake:  Mild decrease in energy intake (Comment) (since adm)  Weight Loss:  Unable to assess     Body Fat Loss:  Unable to assess     Muscle Mass Loss:  Unable to assess    Fluid Accumulation:  Unable to assess     Strength:  Not Performed    Nutrition Assessment:    Pt adm from SNF w/ SOB/hypoxia x ~1d pta. PMHx Dementia, Colitis, DM. Adm w/ AHRF, Septic Shock, Rt Pleural Effusion, partially loculated hydroPTX, suspected Empyema w/ complete Rt Lung collapse, s/p Rt Pigtail Insert and Corpak 9/20. Noted Mod-Sev Oral/suspected pharyngeal Dysphagia w/ SLP rec for NPO s/p BSE 10/2. Continues tolerating EN at goal thus far, held today for noted planned PEG placement. Remains at risk d/t ongoing NPO status w/ need for EN support 2/2 Dementia/Dysphagia; also w/ increased needs for healing of multiple wounds. Will provide updated EN rec's PRN to meet current estimated needs and monitor. Nutrition Related Findings:     AMSx3, upper dentures, Abd/BS WDL, +diarrhea, +Corpak clamped, no pressors, no edema, +I/O's, labs reviewed Wound Type: Multiple, Deep Tissue Injury, Pressure Injury, Stage III, Partial Thickness, Open Wounds       Current Nutrition Intake & Therapies:

## 2023-10-04 NOTE — OP NOTE
Operative Note      Patient: Rusty Frederick  YOB: 1940  MRN: [id number]    Date of Procedure: 10/4/2023    Pre-Op Diagnosis Codes: * Dysphagia, unspecified type [R13.10]    Post-Op Diagnosis: Same       Procedure(s):  EGD ESOPHAGOGASTRODUODENOSCOPY PEG TUBE INSERTION    Surgeon(s):  Braulio Stapleton MD    Assistant:   * No surgical staff found *    Anesthesia: Monitor Anesthesia Care    Estimated Blood Loss (mL): Minimal    Complications: None    Specimens:   * No specimens in log *    Implants:  * No implants in log *      Drains:   Gastrostomy/Enterostomy/Jejunostomy Tube Percutaneous Endoscopic Gastrostomy (PEG) LUQ 1 20 fr (Active)   Drainage Appearance None 10/04/23 1453   Site Description Clean, dry & intact 10/04/23 1453   J Port Status Clamped 10/04/23 1453   G Port Status Clamped 10/04/23 1453   Surrounding Skin Clean, dry & intact 10/04/23 1453   Dressing Status Clean, dry & intact 10/04/23 1453   Dressing Type Dry dressing 10/04/23 1453       [REMOVED] Chest Tube Right 1 (Removed)   $ Chest tube insertion $ Yes 09/28/23 2200   Chest Tube Airleak No 09/29/23 0900   Status Gravity 09/28/23 2200   Suction To water seal 09/28/23 2200   Y Connector Used No 09/29/23 0900   Drainage Description Serous 09/29/23 0900   Dressing Status Clean, dry & intact 09/29/23 0900   Site Assessment Clean, dry & intact 09/29/23 0900   Surrounding Skin Clean, dry & intact 09/29/23 0900   Output (ml) 0 ml 09/29/23 0550       [REMOVED] Chest Tube Right 1 (Removed)       [REMOVED] NG/OG/NJ/NE Tube Nasoenteric feeding tube 12 fr Right nostril (Removed)   Surrounding Skin Clean, dry & intact 10/04/23 0800   Securement device Bridle 10/04/23 0800   Status Clamped 10/04/23 0800   Placement Verified X-Ray (Initial); External Catheter Length 10/03/23 1930   NG/OG/NJ/NE External Measurement (cm) 72 cm 10/04/23 0800   Drainage Appearance None 10/03/23 0800   Tube Feeding High Protein 10/03/23 1930   Tube feeding/verify rate was seen in good position without evidence of bleeding. The gastroscope was retrieved. The PEG tube was cut to size and fit with a bumper and a feeding apparatus. PEG noted to be 3 cm at the skin. The patient tolerated the procedure well and went to the recovery room in a good condition. Dr. Robert Hess was present for the procedure.       Electronically signed by Zay Rodriguez DO on 10/4/2023 at 2:56 PM

## 2023-10-04 NOTE — ANESTHESIA PRE PROCEDURE
Department of Anesthesiology  Preprocedure Note       Name:  Sid Farley   Age:  79 y.o.  :  1940                                          MRN:  [id number]         Date:  10/4/2023      Surgeon: Sheron Craft):  Ravin Esposito MD    Procedure: Procedure(s):  EGD ESOPHAGOGASTRODUODENOSCOPY PEG TUBE INSERTION    Medications prior to admission:   Prior to Admission medications    Medication Sig Start Date End Date Taking? Authorizing Provider   linezolid (ZYVOX) 100 MG/5ML suspension Take 30 mLs by mouth every 12 hours for 14 days 10/3/23 10/17/23 Yes Sissy King MD   ertapenem (INVANZ) infusion Infuse 1,000 mg intravenously every 24 hours for 14 days For 2 weeks 10/3/23 10/17/23 Yes Sissy King MD   metoprolol tartrate (LOPRESSOR) 50 MG tablet Take 1 tablet by mouth 2 times daily 10/3/23  Yes Rosana Vargas MD   valproic acid (DEPAKENE) 250 MG/5ML SOLN oral solution Take 5 mLs by mouth in the morning, at noon, and at bedtime 10/3/23  Yes Rosana Vargas MD   albuterol (PROVENTIL) (2.5 MG/3ML) 0.083% nebulizer solution Take 3 mLs by nebulization in the morning and 3 mLs in the evening. 10/3/23  Yes Rosana Vargas MD   guaiFENesin (ROBITUSSIN) 100 MG/5ML liquid Take 20 mLs by mouth in the morning and 20 mLs in the evening. Do all this for 10 days. 10/3/23 10/13/23 Yes Rosana Vargas MD   Respiratory Therapy Supplies (FULL KIT NEBULIZER SET) MISC Use as directed with nebulized medication.  10/3/23  Yes Rosana Vargas MD   acetaminophen (TYLENOL) 325 MG tablet Take 2 tablets by mouth every 6 hours as needed for Pain or Fever   Yes ProviderElin MD   ferrous sulfate (IRON 325) 325 (65 Fe) MG tablet Take 1 tablet by mouth daily (with breakfast)   Yes Elin Paz MD   folic acid (FOLVITE) 1 MG tablet Take 1 tablet by mouth daily   Yes Elin Paz MD   memantine (NAMENDA) 10 MG tablet Take 1 tablet by mouth 2 times daily   Yes ProviderElin MD   vitamin C (ASCORBIC ACID) 500 MG tablet

## 2023-10-04 NOTE — PROGRESS NOTES
Plan is for PEG tube placement today. Discussed risk/benefits with POA over the phone on 10/3 who agreed to proceed. Continue to hold tube feeds for procedure.     Electronically signed by Martin Garcia DO on 10/4/2023 at 7:50 AM

## 2023-10-05 VITALS
WEIGHT: 143.2 LBS | DIASTOLIC BLOOD PRESSURE: 57 MMHG | SYSTOLIC BLOOD PRESSURE: 127 MMHG | OXYGEN SATURATION: 97 % | HEART RATE: 75 BPM | RESPIRATION RATE: 22 BRPM | BODY MASS INDEX: 25.37 KG/M2 | TEMPERATURE: 98.4 F | HEIGHT: 63 IN

## 2023-10-05 LAB
ANION GAP SERPL CALCULATED.3IONS-SCNC: 12 MMOL/L (ref 7–16)
BASOPHILS # BLD: 0.15 K/UL (ref 0–0.2)
BASOPHILS NFR BLD: 2 % (ref 0–2)
BUN SERPL-MCNC: 22 MG/DL (ref 6–23)
CALCIUM SERPL-MCNC: 8 MG/DL (ref 8.6–10.2)
CHLORIDE SERPL-SCNC: 112 MMOL/L (ref 98–107)
CO2 SERPL-SCNC: 21 MMOL/L (ref 22–29)
CREAT SERPL-MCNC: 0.4 MG/DL (ref 0.5–1)
EOSINOPHIL # BLD: 0.08 K/UL (ref 0.05–0.5)
EOSINOPHILS RELATIVE PERCENT: 1 % (ref 0–6)
ERYTHROCYTE [DISTWIDTH] IN BLOOD BY AUTOMATED COUNT: 21 % (ref 11.5–15)
GFR SERPL CREATININE-BSD FRML MDRD: >60 ML/MIN/1.73M2
GLUCOSE BLD-MCNC: 128 MG/DL (ref 74–99)
GLUCOSE BLD-MCNC: 142 MG/DL (ref 74–99)
GLUCOSE SERPL-MCNC: 118 MG/DL (ref 74–99)
HCT VFR BLD AUTO: 25 % (ref 34–48)
HGB BLD-MCNC: 7.4 G/DL (ref 11.5–15.5)
LYMPHOCYTES NFR BLD: 1.89 K/UL (ref 1.5–4)
LYMPHOCYTES RELATIVE PERCENT: 22 % (ref 20–42)
MCH RBC QN AUTO: 28.5 PG (ref 26–35)
MCHC RBC AUTO-ENTMCNC: 29.6 G/DL (ref 32–34.5)
MCV RBC AUTO: 96.2 FL (ref 80–99.9)
MICROORGANISM SPEC CULT: NORMAL
MICROORGANISM/AGENT SPEC: NORMAL
MONOCYTES NFR BLD: 0.38 K/UL (ref 0.1–0.95)
MONOCYTES NFR BLD: 4 % (ref 2–12)
NEUTROPHILS NFR BLD: 71 % (ref 43–80)
NEUTS SEG NFR BLD: 6.11 K/UL (ref 1.8–7.3)
PLATELET # BLD AUTO: 406 K/UL (ref 130–450)
PMV BLD AUTO: 9.6 FL (ref 7–12)
POTASSIUM SERPL-SCNC: 3.6 MMOL/L (ref 3.5–5)
RBC # BLD AUTO: 2.6 M/UL (ref 3.5–5.5)
RBC # BLD: ABNORMAL 10*6/UL
SODIUM SERPL-SCNC: 145 MMOL/L (ref 132–146)
SPECIMEN DESCRIPTION: NORMAL
WBC OTHER # BLD: 8.6 K/UL (ref 4.5–11.5)

## 2023-10-05 PROCEDURE — 6370000000 HC RX 637 (ALT 250 FOR IP): Performed by: INTERNAL MEDICINE

## 2023-10-05 PROCEDURE — 2580000003 HC RX 258: Performed by: INTERNAL MEDICINE

## 2023-10-05 PROCEDURE — 6360000002 HC RX W HCPCS: Performed by: INTERNAL MEDICINE

## 2023-10-05 PROCEDURE — 6370000000 HC RX 637 (ALT 250 FOR IP): Performed by: CHIROPRACTOR

## 2023-10-05 PROCEDURE — 82962 GLUCOSE BLOOD TEST: CPT

## 2023-10-05 PROCEDURE — 2580000003 HC RX 258: Performed by: FAMILY MEDICINE

## 2023-10-05 PROCEDURE — 6360000002 HC RX W HCPCS: Performed by: STUDENT IN AN ORGANIZED HEALTH CARE EDUCATION/TRAINING PROGRAM

## 2023-10-05 PROCEDURE — 6370000000 HC RX 637 (ALT 250 FOR IP): Performed by: FAMILY MEDICINE

## 2023-10-05 PROCEDURE — 2500000003 HC RX 250 WO HCPCS: Performed by: INTERNAL MEDICINE

## 2023-10-05 PROCEDURE — C9113 INJ PANTOPRAZOLE SODIUM, VIA: HCPCS | Performed by: STUDENT IN AN ORGANIZED HEALTH CARE EDUCATION/TRAINING PROGRAM

## 2023-10-05 PROCEDURE — 2580000003 HC RX 258

## 2023-10-05 PROCEDURE — 36415 COLL VENOUS BLD VENIPUNCTURE: CPT

## 2023-10-05 PROCEDURE — 85025 COMPLETE CBC W/AUTO DIFF WBC: CPT

## 2023-10-05 PROCEDURE — 2580000003 HC RX 258: Performed by: STUDENT IN AN ORGANIZED HEALTH CARE EDUCATION/TRAINING PROGRAM

## 2023-10-05 PROCEDURE — A4216 STERILE WATER/SALINE, 10 ML: HCPCS | Performed by: STUDENT IN AN ORGANIZED HEALTH CARE EDUCATION/TRAINING PROGRAM

## 2023-10-05 PROCEDURE — 80048 BASIC METABOLIC PNL TOTAL CA: CPT

## 2023-10-05 RX ADMIN — SODIUM CHLORIDE, PRESERVATIVE FREE 10 ML: 5 INJECTION INTRAVENOUS at 08:36

## 2023-10-05 RX ADMIN — SODIUM CHLORIDE, PRESERVATIVE FREE 40 MG: 5 INJECTION INTRAVENOUS at 08:34

## 2023-10-05 RX ADMIN — COLLAGENASE SANTYL: 250 OINTMENT TOPICAL at 08:34

## 2023-10-05 RX ADMIN — LINEZOLID 600 MG: 100 SUSPENSION ORAL at 08:36

## 2023-10-05 RX ADMIN — SODIUM CHLORIDE: 9 INJECTION, SOLUTION INTRAVENOUS at 10:52

## 2023-10-05 RX ADMIN — HEPARIN 100 UNITS: 100 SYRINGE at 08:35

## 2023-10-05 RX ADMIN — METOPROLOL TARTRATE 50 MG: 50 TABLET ORAL at 08:33

## 2023-10-05 RX ADMIN — THIAMINE HYDROCHLORIDE 100 MG: 100 INJECTION, SOLUTION INTRAMUSCULAR; INTRAVENOUS at 08:33

## 2023-10-05 RX ADMIN — MEMANTINE 10 MG: 10 TABLET ORAL at 08:33

## 2023-10-05 RX ADMIN — PETROLATUM: 420 OINTMENT TOPICAL at 08:35

## 2023-10-05 RX ADMIN — ASPIRIN 81 MG CHEWABLE TABLET 81 MG: 81 TABLET CHEWABLE at 08:33

## 2023-10-05 RX ADMIN — ATORVASTATIN CALCIUM 10 MG: 10 TABLET, FILM COATED ORAL at 08:33

## 2023-10-05 RX ADMIN — GUAIFENESIN 400 MG: 200 SOLUTION ORAL at 12:58

## 2023-10-05 RX ADMIN — VALPROIC ACID 250 MG: 250 SOLUTION ORAL at 08:33

## 2023-10-05 NOTE — PROGRESS NOTES
SURGERY NOTE    S/P EGD and PEG placement  PEG 3cm at skin  Bumper rotates easily  Abdomen soft, non-distended, non-tender  OK for medications and TF via PEG  Abdominal binder for 4 weeks  No further surgical intervention indicated at this time  Follow-up as needed      Meseret Maldonado DO  Resident, PGY-2  10/5/2023  12:24 PM

## 2023-10-05 NOTE — DISCHARGE SUMMARY
[facility]   Discharge summary   Patient ID:  Alee Brown  [id number]  79 y.o.  1940    Admit date: 9/18/2023    Discharge date and time: 10/5/2023    Admission Diagnoses:   Patient Active Problem List   Diagnosis    Essential hypertension    Acute colitis    Nausea and vomiting    Depression    Dementia ([address])    Cholelithiases    HTN (hypertension)    Leukocytosis    Shingles    Biliary colic    Sepsis ([address])    New onset atrial fibrillation (HCC)    Pleural effusion on right    Empyema of lung (HCC)    Pleural effusion       Discharge Diagnoses: ***    Consults: {consultation:25340}    Procedures: ***    Hospital Course: The patient is a 79 [facility]. [facility] with significant past medical history of *** who presents with ***    Recent Labs     10/03/23  0635 10/05/23  0543   WBC 10.5 8.6   HGB 8.6* 7.4*   HCT 28.8* 25.0*    406       Recent Labs     10/03/23  0635 10/05/23  0543    145   K 3.9 3.6   * 112*   CO2 23 21*   BUN 19 22   CREATININE 0.4* 0.4*   CALCIUM 8.5* 8.0*       CTA PULMONARY W CONTRAST    Result Date: 9/19/2023  EXAMINATION: CTA OF THE CHEST 9/19/2023 2:24 am TECHNIQUE: CTA of the chest was performed after the administration of intravenous contrast.  Multiplanar reformatted images are provided for review. MIP images are provided for review. Automated exposure control, iterative reconstruction, and/or weight based adjustment of the mA/kV was utilized to reduce the radiation dose to as low as reasonably achievable. COMPARISON: Correlation with radiographs of September 18, 2023 and July 4, 2023 HISTORY: ORDERING SYSTEM PROVIDED HISTORY: r/o PE TECHNOLOGIST PROVIDED HISTORY: Reason for exam:->r/o PE What reading provider will be dictating this exam?->CRC FINDINGS: Pulmonary Arteries: Pulmonary arteries are adequately opacified for evaluation. No evidence of intraluminal filling defect to suggest pulmonary embolism.   Motion artifact demonstrate no acute abnormality. There is no acute abnormality of the thoracic aorta. Supra-aortic trunks are widely patent. Lungs/pleura: Extremely large right pleural effusion with partially loculated anterior hydropneumothorax component, possibly related to superimposed empyema at the anterior right base and extending to the anterior inferior right pulmonary hilum. Complete collapse of the right lung. Mild leftward shift of the cardio mediastinum. Upper Abdomen: Cholelithiasis. Otherwise, limited images of the upper abdomen are unremarkable. Soft Tissues/Bones: No acute bone or soft tissue abnormality. 1. No evidence of pulmonary embolism. 2. Extremely large right pleural effusion with partially loculated anterior hydropneumothorax component, possibly related to superimposed empyema at the anterior right base and extending to the anterior inferior right pulmonary hilum. 3. Complete collapse of the right lung with mild leftward shift of the cardio mediastinum secondary to #2 above. RECOMMENDATIONS: Careful clinical correlation and follow up recommended. CT HEAD WO CONTRAST    Result Date: 9/18/2023  EXAMINATION: CT OF THE HEAD WITHOUT CONTRAST  9/18/2023 2:41 pm TECHNIQUE: CT of the head was performed without the administration of intravenous contrast. Automated exposure control, iterative reconstruction, and/or weight based adjustment of the mA/kV was utilized to reduce the radiation dose to as low as reasonably achievable. COMPARISON: 07/02/2023 HISTORY: ORDERING SYSTEM PROVIDED HISTORY: [facility] TECHNOLOGIST PROVIDED HISTORY: Reason for exam:->AMS Has a \"code stroke\" or \"stroke alert\" been called? ->No Decision Support Exception - unselect if not a suspected or confirmed emergency medical condition->Emergency Medical Condition (MA) What reading provider will be dictating this exam?->CRC FINDINGS: BRAIN/VENTRICLES: Age related changes of brain volume loss and nonspecific white matter hypodensity most

## 2023-10-05 NOTE — ANESTHESIA POSTPROCEDURE EVALUATION
Department of Anesthesiology  Postprocedure Note    Patient: Sanjeev Knight  MRN: [id number]  YOB: 1940  Date of evaluation: 10/4/2023      Procedure Summary     Date: 10/04/23 Room / Location: [address] [place] [address] / [facility]    Anesthesia Start: 1440 Anesthesia Stop: 2298    Procedure: EGD ESOPHAGOGASTRODUODENOSCOPY PEG TUBE INSERTION Diagnosis:       Dysphagia, unspecified type      (Dysphagia, unspecified type [R13.10])    Surgeons: Christinia Kawasaki, MD Responsible Provider: Namrata José MD    Anesthesia Type: MAC ASA Status: 3          Anesthesia Type: No value filed.     Stephie Phase I: Stephie Score: 9    Stephie Phase II:        Anesthesia Post Evaluation    Patient location during evaluation: PACU  Patient participation: complete - patient participated  Level of consciousness: awake and alert  Airway patency: patent  Nausea & Vomiting: no nausea and no vomiting  Complications: no  Cardiovascular status: blood pressure returned to baseline  Respiratory status: acceptable  Hydration status: euvolemic  Pain management: adequate

## 2023-10-05 NOTE — CARE COORDINATION
10/5/23 Update CM Note. Pt admitted 9/18/23 for Pleural effusion on right. PEG placed yesterday. TF at goal rate. Plan for IV Invanz/oral Zyvox x 2 weeks-midline in place. Planning for return to [facility] when medically stable. Ambulance form/ envelope on chart . Sharona Rosas BSN RN-BC  [phone] 1130 Addendum: Discharge order noted. PAS scheduled for 330p pickup for return to facility. Facility liaison notified. Message left for niece Claudean Rafter.

## 2023-10-05 NOTE — PROGRESS NOTES
Department of Internal Medicine  Infectious Diseases   Progress  Note      C/C :  Empyema /  leukocytosis     Pt is awake , no distress  No fever         Current Facility-Administered Medications   Medication Dose Route Frequency Provider Last Rate Last Admin    linezolid (ZYVOX) 100 MG/5ML suspension 600 mg  600 mg Oral 2 times per day Zully Cowan DO   600 mg at 10/05/23 0836    lidocaine 1 % injection 5 mL  5 mL IntraDERmal Once Zully Moran DO        sodium chloride flush 0.9 % injection 5-40 mL  5-40 mL IntraVENous 2 times per day Zully Cowan DO   10 mL at 10/05/23 0836    sodium chloride flush 0.9 % injection 5-40 mL  5-40 mL IntraVENous PRN Zully Cowan DO        0.9 % sodium chloride infusion   IntraVENous PRN Zully Cowan DO        heparin (PF) 100 UNIT/ML injection 100 Units  1 mL IntraVENous 2 times per day Dirk ESCOBAR DO   100 Units at 10/05/23 0835    heparin (PF) 100 UNIT/ML injection 100 Units  1 mL IntraCATHeter PRN Zully Cowan DO        0.9 % sodium chloride infusion   IntraVENous Continuous Zully Moran  mL/hr at 10/05/23 1052 New Bag at 10/05/23 1052    ceFAZolin (ANCEF) 2,000 mg in sterile water 20 mL IV syringe  2,000 mg IntraVENous See Admin Instructions Dirk ESCOBAR DO        acetaminophen (TYLENOL) 160 MG/5ML solution 650 mg  650 mg Oral Q6H PRN Zully Moran DO        And    acetaminophen (TYLENOL) suppository 650 mg  650 mg Rectal Q4H PRN Zully Cowan DO        albuterol (PROVENTIL) (2.5 MG/3ML) 0.083% nebulizer solution 2.5 mg  2.5 mg Nebulization Q12H Zully Moran DO   2.5 mg at 10/04/23 1806    ertapenem (INVanz) 1,000 mg in sodium chloride 0.9 % 50 mL IVPB (mini-bag)  1,000 mg IntraVENous Q24H Zully Moran DO   Stopped at 10/04/23 1717    0.9 % sodium chloride infusion   IntraVENous PRN Autumn Parsons MD        metoprolol tartrate (LOPRESSOR) tablet 50 mg  50 mg Oral BID Autumn Parsons MD   50 mg at 10/05/23 7872 10/05/2023 05:43 AM    HCT 25.0 10/05/2023 05:43 AM     10/05/2023 05:43 AM    MCV 96.2 10/05/2023 05:43 AM    MCH 28.5 10/05/2023 05:43 AM    MCHC 29.6 10/05/2023 05:43 AM    RDW 21.0 10/05/2023 05:43 AM    NRBC 1 10/03/2023 06:35 AM    SEGSPCT 54 03/14/2011 11:00 AM    METASPCT 1 09/29/2023 06:30 AM    METASPCT 1.7 07/07/2023 07:35 AM    LYMPHOPCT 22 10/05/2023 05:43 AM    MONOPCT 4 10/05/2023 05:43 AM    MYELOPCT 1 10/02/2023 05:21 AM    MYELOPCT 1.7 07/07/2023 07:35 AM    BASOPCT 2 10/05/2023 05:43 AM    MONOSABS 0.38 10/05/2023 05:43 AM    LYMPHSABS 1.89 10/05/2023 05:43 AM    EOSABS 0.08 10/05/2023 05:43 AM    BASOSABS 0.15 10/05/2023 05:43 AM       CMP     Lab Results   Component Value Date/Time     10/05/2023 05:43 AM    K 3.6 10/05/2023 05:43 AM    K 3.9 10/05/2021 03:36 PM     10/05/2023 05:43 AM    CO2 21 10/05/2023 05:43 AM    BUN 22 10/05/2023 05:43 AM    CREATININE 0.4 10/05/2023 05:43 AM    GFRAA >60 10/05/2021 03:36 PM    LABGLOM >60 10/05/2023 05:43 AM    GLUCOSE 118 10/05/2023 05:43 AM    PROT 5.0 09/24/2023 04:13 AM    LABALBU 2.4 09/24/2023 04:13 AM    CALCIUM 8.0 10/05/2023 05:43 AM    BILITOT 0.4 09/24/2023 04:13 AM    ALKPHOS 125 09/24/2023 04:13 AM    AST 33 09/24/2023 04:13 AM    ALT 12 09/24/2023 04:13 AM     Hepatic Function Panel:    Lab Results   Component Value Date/Time    ALKPHOS 125 09/24/2023 04:13 AM    ALT 12 09/24/2023 04:13 AM    AST 33 09/24/2023 04:13 AM    PROT 5.0 09/24/2023 04:13 AM    BILITOT 0.4 09/24/2023 04:13 AM    BILIDIR <0.2 09/24/2023 04:13 AM    IBILI Can not be calculated 09/24/2023 04:13 AM    LABALBU 2.4 09/24/2023 04:13 AM     PT/INR:    Lab Results   Component Value Date/Time    PROTIME 15.3 09/21/2023 02:44 PM    INR 1.4 09/21/2023 02:44 PM       TSH:    Lab Results   Component Value Date/Time    TSH 5.07 09/19/2023 10:58 AM       U/A:    Lab Results   Component Value Date/Time    COLORU Yellow 07/01/2023 11:48 PM    PHUR 8.0 07/01/2023 BACTERIAL SUSCEPTIBILITY PANEL RUBEN Final    levofloxacin Sensitive 1 BACTERIAL SUSCEPTIBILITY PANEL RUBEN Final    meropenem Sensitive <=0.25 BACTERIAL SUSCEPTIBILITY PANEL RUBEN Final    piperacillin-tazobactam Sensitive <=4 BACTERIAL SUSCEPTIBILITY PANEL RUBEN Final    trimethoprim-sulfamethoxazole Resistant >=320 BACTERIAL SUSCEPTIBILITY PANEL RUBEN Final               Radiology :      Chest x ray -  Partial interval clearing of right basilar atelectasis and pleural effusion. IMPRESSION:     Right pleural effusion-( exudative ) empyema s/p pig tail drainage ( 9/20) -and removal   Leukocytosis - resolved     RECOMMENDATIONS:       1. Invanz 1 gram IV q 24 hrs /  Oral zyvox 600 mg po q 12 hrs ~ next 2 weeks ) midline placed )   2. Monitor labs   3.  ID follow up in 1-2 weeks         10/5/2023  Karlee Mayfield MD

## 2023-10-08 LAB
MICROORGANISM SPEC CULT: NORMAL
MICROORGANISM/AGENT SPEC: NORMAL
SPECIMEN DESCRIPTION: NORMAL

## 2023-10-12 LAB
MICROORGANISM SPEC CULT: NORMAL
MICROORGANISM/AGENT SPEC: NORMAL
SPECIMEN DESCRIPTION: NORMAL

## 2023-10-15 LAB
MICROORGANISM SPEC CULT: NORMAL
MICROORGANISM/AGENT SPEC: NORMAL
SPECIMEN DESCRIPTION: NORMAL

## 2023-10-19 LAB
MICROORGANISM SPEC CULT: NORMAL
MICROORGANISM SPEC CULT: NORMAL
MICROORGANISM/AGENT SPEC: NORMAL
MICROORGANISM/AGENT SPEC: NORMAL
SPECIMEN DESCRIPTION: NORMAL
SPECIMEN DESCRIPTION: NORMAL

## 2023-10-26 LAB
MICROORGANISM SPEC CULT: NORMAL
MICROORGANISM/AGENT SPEC: NORMAL
SPECIMEN DESCRIPTION: NORMAL

## 2023-11-02 LAB
MICROORGANISM SPEC CULT: NORMAL
MICROORGANISM/AGENT SPEC: NORMAL
SPECIMEN DESCRIPTION: NORMAL

## 2023-12-20 ENCOUNTER — HOSPITAL ENCOUNTER (INPATIENT)
Age: 83
LOS: 3 days | Discharge: NURSING, REHAB OR LONG-TERM CARE | DRG: 604 | End: 2023-12-23
Attending: STUDENT IN AN ORGANIZED HEALTH CARE EDUCATION/TRAINING PROGRAM | Admitting: INTERNAL MEDICINE
Payer: MEDICARE

## 2023-12-20 ENCOUNTER — APPOINTMENT (OUTPATIENT)
Dept: GENERAL RADIOLOGY | Age: 83
DRG: 604 | End: 2023-12-20
Payer: MEDICARE

## 2023-12-20 DIAGNOSIS — T14.8XXA WOUND INFECTION: Primary | ICD-10-CM

## 2023-12-20 DIAGNOSIS — L08.9 WOUND INFECTION: Primary | ICD-10-CM

## 2023-12-20 PROBLEM — S81.802A LEG WOUND, LEFT, INITIAL ENCOUNTER: Status: ACTIVE | Noted: 2023-12-20

## 2023-12-20 LAB
ALBUMIN SERPL-MCNC: 3.5 G/DL (ref 3.5–5.2)
ALP SERPL-CCNC: 127 U/L (ref 35–104)
ALT SERPL-CCNC: 10 U/L (ref 0–32)
ANION GAP SERPL CALCULATED.3IONS-SCNC: 13 MMOL/L (ref 7–16)
AST SERPL-CCNC: 43 U/L (ref 0–31)
BASOPHILS # BLD: 0.07 K/UL (ref 0–0.2)
BASOPHILS NFR BLD: 1 % (ref 0–2)
BILIRUB SERPL-MCNC: 0.2 MG/DL (ref 0–1.2)
BILIRUB UR QL STRIP: NEGATIVE
BUN SERPL-MCNC: 29 MG/DL (ref 6–23)
CALCIUM SERPL-MCNC: 9.2 MG/DL (ref 8.6–10.2)
CHLORIDE SERPL-SCNC: 101 MMOL/L (ref 98–107)
CLARITY UR: CLEAR
CO2 SERPL-SCNC: 23 MMOL/L (ref 22–29)
COLOR UR: YELLOW
COMMENT: NORMAL
CREAT SERPL-MCNC: 0.6 MG/DL (ref 0.5–1)
EKG ATRIAL RATE: 62 BPM
EKG P AXIS: 10 DEGREES
EKG P-R INTERVAL: 170 MS
EKG Q-T INTERVAL: 414 MS
EKG QRS DURATION: 96 MS
EKG QTC CALCULATION (BAZETT): 420 MS
EKG R AXIS: -24 DEGREES
EKG T AXIS: -18 DEGREES
EKG VENTRICULAR RATE: 62 BPM
EOSINOPHIL # BLD: 0.36 K/UL (ref 0.05–0.5)
EOSINOPHILS RELATIVE PERCENT: 5 % (ref 0–6)
ERYTHROCYTE [DISTWIDTH] IN BLOOD BY AUTOMATED COUNT: 17.2 % (ref 11.5–15)
GFR SERPL CREATININE-BSD FRML MDRD: >60 ML/MIN/1.73M2
GLUCOSE SERPL-MCNC: 100 MG/DL (ref 74–99)
GLUCOSE UR STRIP-MCNC: NEGATIVE MG/DL
HCT VFR BLD AUTO: 34.2 % (ref 34–48)
HGB BLD-MCNC: 10.6 G/DL (ref 11.5–15.5)
HGB UR QL STRIP.AUTO: NEGATIVE
IMM GRANULOCYTES # BLD AUTO: <0.03 K/UL (ref 0–0.58)
IMM GRANULOCYTES NFR BLD: 0 % (ref 0–5)
KETONES UR STRIP-MCNC: NEGATIVE MG/DL
LACTATE BLDV-SCNC: 1.1 MMOL/L (ref 0.5–2.2)
LEUKOCYTE ESTERASE UR QL STRIP: NEGATIVE
LYMPHOCYTES NFR BLD: 1.72 K/UL (ref 1.5–4)
LYMPHOCYTES RELATIVE PERCENT: 23 % (ref 20–42)
MCH RBC QN AUTO: 29.8 PG (ref 26–35)
MCHC RBC AUTO-ENTMCNC: 31 G/DL (ref 32–34.5)
MCV RBC AUTO: 96.1 FL (ref 80–99.9)
MONOCYTES NFR BLD: 1.48 K/UL (ref 0.1–0.95)
MONOCYTES NFR BLD: 20 % (ref 2–12)
NEUTROPHILS NFR BLD: 52 % (ref 43–80)
NEUTS SEG NFR BLD: 3.9 K/UL (ref 1.8–7.3)
NITRITE UR QL STRIP: NEGATIVE
PH UR STRIP: 7 [PH] (ref 5–9)
PLATELET # BLD AUTO: 336 K/UL (ref 130–450)
PMV BLD AUTO: 9.9 FL (ref 7–12)
POTASSIUM SERPL-SCNC: 4.6 MMOL/L (ref 3.5–5)
POTASSIUM SERPL-SCNC: 5.5 MMOL/L (ref 3.5–5)
PROT SERPL-MCNC: 7.3 G/DL (ref 6.4–8.3)
PROT UR STRIP-MCNC: NEGATIVE MG/DL
RBC # BLD AUTO: 3.56 M/UL (ref 3.5–5.5)
SODIUM SERPL-SCNC: 137 MMOL/L (ref 132–146)
SP GR UR STRIP: 1.01 (ref 1–1.03)
UROBILINOGEN UR STRIP-ACNC: 0.2 EU/DL (ref 0–1)
WBC OTHER # BLD: 7.6 K/UL (ref 4.5–11.5)

## 2023-12-20 PROCEDURE — 6360000002 HC RX W HCPCS: Performed by: STUDENT IN AN ORGANIZED HEALTH CARE EDUCATION/TRAINING PROGRAM

## 2023-12-20 PROCEDURE — 87040 BLOOD CULTURE FOR BACTERIA: CPT

## 2023-12-20 PROCEDURE — 99285 EMERGENCY DEPT VISIT HI MDM: CPT

## 2023-12-20 PROCEDURE — 96365 THER/PROPH/DIAG IV INF INIT: CPT

## 2023-12-20 PROCEDURE — 96367 TX/PROPH/DG ADDL SEQ IV INF: CPT

## 2023-12-20 PROCEDURE — 85025 COMPLETE CBC W/AUTO DIFF WBC: CPT

## 2023-12-20 PROCEDURE — 71045 X-RAY EXAM CHEST 1 VIEW: CPT

## 2023-12-20 PROCEDURE — 2580000003 HC RX 258: Performed by: STUDENT IN AN ORGANIZED HEALTH CARE EDUCATION/TRAINING PROGRAM

## 2023-12-20 PROCEDURE — 6360000002 HC RX W HCPCS: Performed by: FAMILY MEDICINE

## 2023-12-20 PROCEDURE — 94640 AIRWAY INHALATION TREATMENT: CPT

## 2023-12-20 PROCEDURE — 2060000000 HC ICU INTERMEDIATE R&B

## 2023-12-20 PROCEDURE — 81003 URINALYSIS AUTO W/O SCOPE: CPT

## 2023-12-20 PROCEDURE — 84132 ASSAY OF SERUM POTASSIUM: CPT

## 2023-12-20 PROCEDURE — 83605 ASSAY OF LACTIC ACID: CPT

## 2023-12-20 PROCEDURE — 87205 SMEAR GRAM STAIN: CPT

## 2023-12-20 PROCEDURE — 73552 X-RAY EXAM OF FEMUR 2/>: CPT

## 2023-12-20 PROCEDURE — 93010 ELECTROCARDIOGRAM REPORT: CPT | Performed by: INTERNAL MEDICINE

## 2023-12-20 PROCEDURE — 87077 CULTURE AEROBIC IDENTIFY: CPT

## 2023-12-20 PROCEDURE — 93005 ELECTROCARDIOGRAM TRACING: CPT

## 2023-12-20 PROCEDURE — 87154 CUL TYP ID BLD PTHGN 6+ TRGT: CPT

## 2023-12-20 PROCEDURE — 87070 CULTURE OTHR SPECIMN AEROBIC: CPT

## 2023-12-20 PROCEDURE — 80053 COMPREHEN METABOLIC PANEL: CPT

## 2023-12-20 RX ORDER — VALPROIC ACID 250 MG/5ML
250 SOLUTION ORAL 3 TIMES DAILY
Status: DISCONTINUED | OUTPATIENT
Start: 2023-12-20 | End: 2023-12-23 | Stop reason: HOSPADM

## 2023-12-20 RX ORDER — ACETAMINOPHEN 325 MG/1
650 TABLET ORAL EVERY 6 HOURS PRN
Status: DISCONTINUED | OUTPATIENT
Start: 2023-12-20 | End: 2023-12-20 | Stop reason: ALTCHOICE

## 2023-12-20 RX ORDER — FERROUS SULFATE 325(65) MG
325 TABLET ORAL
Status: DISCONTINUED | OUTPATIENT
Start: 2023-12-21 | End: 2023-12-23 | Stop reason: HOSPADM

## 2023-12-20 RX ORDER — DIVALPROEX SODIUM 250 MG/1
250 TABLET, DELAYED RELEASE ORAL 3 TIMES DAILY
Status: DISCONTINUED | OUTPATIENT
Start: 2023-12-20 | End: 2023-12-20

## 2023-12-20 RX ORDER — SODIUM CHLORIDE 0.9 % (FLUSH) 0.9 %
5-40 SYRINGE (ML) INJECTION EVERY 12 HOURS SCHEDULED
Status: DISCONTINUED | OUTPATIENT
Start: 2023-12-20 | End: 2023-12-23 | Stop reason: HOSPADM

## 2023-12-20 RX ORDER — POLYETHYLENE GLYCOL 3350 17 G/17G
17 POWDER, FOR SOLUTION ORAL DAILY PRN
Status: DISCONTINUED | OUTPATIENT
Start: 2023-12-20 | End: 2023-12-23 | Stop reason: HOSPADM

## 2023-12-20 RX ORDER — ACETAMINOPHEN 325 MG/1
650 TABLET ORAL EVERY 6 HOURS PRN
Status: DISCONTINUED | OUTPATIENT
Start: 2023-12-20 | End: 2023-12-23 | Stop reason: HOSPADM

## 2023-12-20 RX ORDER — ATORVASTATIN CALCIUM 10 MG/1
10 TABLET, FILM COATED ORAL
Status: DISCONTINUED | OUTPATIENT
Start: 2023-12-20 | End: 2023-12-23 | Stop reason: HOSPADM

## 2023-12-20 RX ORDER — DOXYCYCLINE 25 MG/5ML
100 POWDER, FOR SUSPENSION ORAL 2 TIMES DAILY
Status: ON HOLD | COMMUNITY
Start: 2023-12-07 | End: 2023-12-22 | Stop reason: HOSPADM

## 2023-12-20 RX ORDER — SODIUM CHLORIDE 0.9 % (FLUSH) 0.9 %
10 SYRINGE (ML) INJECTION PRN
Status: DISCONTINUED | OUTPATIENT
Start: 2023-12-20 | End: 2023-12-23 | Stop reason: HOSPADM

## 2023-12-20 RX ORDER — POTASSIUM CHLORIDE 20 MEQ/1
40 TABLET, EXTENDED RELEASE ORAL PRN
Status: DISCONTINUED | OUTPATIENT
Start: 2023-12-20 | End: 2023-12-21

## 2023-12-20 RX ORDER — MAGNESIUM SULFATE IN WATER 40 MG/ML
2000 INJECTION, SOLUTION INTRAVENOUS PRN
Status: DISCONTINUED | OUTPATIENT
Start: 2023-12-20 | End: 2023-12-21

## 2023-12-20 RX ORDER — MEMANTINE HYDROCHLORIDE 10 MG/1
10 TABLET ORAL 2 TIMES DAILY
Status: DISCONTINUED | OUTPATIENT
Start: 2023-12-20 | End: 2023-12-23 | Stop reason: HOSPADM

## 2023-12-20 RX ORDER — ONDANSETRON 4 MG/1
4 TABLET, ORALLY DISINTEGRATING ORAL EVERY 8 HOURS PRN
Status: DISCONTINUED | OUTPATIENT
Start: 2023-12-20 | End: 2023-12-23 | Stop reason: HOSPADM

## 2023-12-20 RX ORDER — ACETAMINOPHEN 650 MG/1
650 SUPPOSITORY RECTAL EVERY 6 HOURS PRN
Status: DISCONTINUED | OUTPATIENT
Start: 2023-12-20 | End: 2023-12-23 | Stop reason: HOSPADM

## 2023-12-20 RX ORDER — FOLIC ACID 1 MG/1
1 TABLET ORAL DAILY
Status: DISCONTINUED | OUTPATIENT
Start: 2023-12-20 | End: 2023-12-23 | Stop reason: HOSPADM

## 2023-12-20 RX ORDER — SODIUM CHLORIDE 9 MG/ML
INJECTION, SOLUTION INTRAVENOUS PRN
Status: DISCONTINUED | OUTPATIENT
Start: 2023-12-20 | End: 2023-12-23 | Stop reason: HOSPADM

## 2023-12-20 RX ORDER — METOPROLOL SUCCINATE 25 MG/1
50 TABLET, EXTENDED RELEASE ORAL DAILY
Status: DISCONTINUED | OUTPATIENT
Start: 2023-12-20 | End: 2023-12-21 | Stop reason: SDUPTHER

## 2023-12-20 RX ORDER — ALBUTEROL SULFATE 2.5 MG/3ML
2.5 SOLUTION RESPIRATORY (INHALATION) EVERY 12 HOURS
Status: DISCONTINUED | OUTPATIENT
Start: 2023-12-20 | End: 2023-12-23 | Stop reason: HOSPADM

## 2023-12-20 RX ORDER — POTASSIUM CHLORIDE 7.45 MG/ML
10 INJECTION INTRAVENOUS PRN
Status: DISCONTINUED | OUTPATIENT
Start: 2023-12-20 | End: 2023-12-21

## 2023-12-20 RX ORDER — ONDANSETRON 2 MG/ML
4 INJECTION INTRAMUSCULAR; INTRAVENOUS EVERY 6 HOURS PRN
Status: DISCONTINUED | OUTPATIENT
Start: 2023-12-20 | End: 2023-12-23 | Stop reason: HOSPADM

## 2023-12-20 RX ORDER — MORPHINE SULFATE 2 MG/ML
2 INJECTION, SOLUTION INTRAMUSCULAR; INTRAVENOUS EVERY 4 HOURS PRN
Status: DISCONTINUED | OUTPATIENT
Start: 2023-12-20 | End: 2023-12-23 | Stop reason: HOSPADM

## 2023-12-20 RX ORDER — DOCUSATE SODIUM 100 MG/1
100 CAPSULE, LIQUID FILLED ORAL 2 TIMES DAILY
Status: DISCONTINUED | OUTPATIENT
Start: 2023-12-20 | End: 2023-12-23 | Stop reason: HOSPADM

## 2023-12-20 RX ORDER — MIRTAZAPINE 15 MG/1
30 TABLET, FILM COATED ORAL NIGHTLY
Status: DISCONTINUED | OUTPATIENT
Start: 2023-12-20 | End: 2023-12-23 | Stop reason: HOSPADM

## 2023-12-20 RX ADMIN — ALBUTEROL SULFATE 2.5 MG: 2.5 SOLUTION RESPIRATORY (INHALATION) at 21:40

## 2023-12-20 RX ADMIN — CEFEPIME: 1 INJECTION, POWDER, FOR SOLUTION INTRAMUSCULAR; INTRAVENOUS at 11:42

## 2023-12-20 RX ADMIN — VANCOMYCIN HYDROCHLORIDE 1000 MG: 1 INJECTION, POWDER, LYOPHILIZED, FOR SOLUTION INTRAVENOUS at 12:41

## 2023-12-21 ENCOUNTER — APPOINTMENT (OUTPATIENT)
Dept: GENERAL RADIOLOGY | Age: 83
DRG: 604 | End: 2023-12-21
Payer: MEDICARE

## 2023-12-21 LAB
ALBUMIN SERPL-MCNC: 3.3 G/DL (ref 3.5–5.2)
ALP SERPL-CCNC: 101 U/L (ref 35–104)
ALT SERPL-CCNC: 8 U/L (ref 0–32)
ANION GAP SERPL CALCULATED.3IONS-SCNC: 13 MMOL/L (ref 7–16)
AST SERPL-CCNC: 19 U/L (ref 0–31)
BASOPHILS # BLD: 0.07 K/UL (ref 0–0.2)
BASOPHILS NFR BLD: 1 % (ref 0–2)
BILIRUB SERPL-MCNC: 0.2 MG/DL (ref 0–1.2)
BUN SERPL-MCNC: 24 MG/DL (ref 6–23)
CALCIUM SERPL-MCNC: 9.2 MG/DL (ref 8.6–10.2)
CHLORIDE SERPL-SCNC: 109 MMOL/L (ref 98–107)
CO2 SERPL-SCNC: 22 MMOL/L (ref 22–29)
CREAT SERPL-MCNC: 0.6 MG/DL (ref 0.5–1)
EOSINOPHIL # BLD: 0.39 K/UL (ref 0.05–0.5)
EOSINOPHILS RELATIVE PERCENT: 6 % (ref 0–6)
ERYTHROCYTE [DISTWIDTH] IN BLOOD BY AUTOMATED COUNT: 17.1 % (ref 11.5–15)
GFR SERPL CREATININE-BSD FRML MDRD: >60 ML/MIN/1.73M2
GLUCOSE SERPL-MCNC: 78 MG/DL (ref 74–99)
HCT VFR BLD AUTO: 34.3 % (ref 34–48)
HGB BLD-MCNC: 10.4 G/DL (ref 11.5–15.5)
IMM GRANULOCYTES # BLD AUTO: <0.03 K/UL (ref 0–0.58)
IMM GRANULOCYTES NFR BLD: 0 % (ref 0–5)
LYMPHOCYTES NFR BLD: 1.81 K/UL (ref 1.5–4)
LYMPHOCYTES RELATIVE PERCENT: 29 % (ref 20–42)
MCH RBC QN AUTO: 29.6 PG (ref 26–35)
MCHC RBC AUTO-ENTMCNC: 30.3 G/DL (ref 32–34.5)
MCV RBC AUTO: 97.7 FL (ref 80–99.9)
MONOCYTES NFR BLD: 0.82 K/UL (ref 0.1–0.95)
MONOCYTES NFR BLD: 13 % (ref 2–12)
NEUTROPHILS NFR BLD: 50 % (ref 43–80)
NEUTS SEG NFR BLD: 3.06 K/UL (ref 1.8–7.3)
PLATELET # BLD AUTO: 311 K/UL (ref 130–450)
PMV BLD AUTO: 9.7 FL (ref 7–12)
POTASSIUM SERPL-SCNC: 4 MMOL/L (ref 3.5–5)
PROT SERPL-MCNC: 7.1 G/DL (ref 6.4–8.3)
RBC # BLD AUTO: 3.51 M/UL (ref 3.5–5.5)
SODIUM SERPL-SCNC: 144 MMOL/L (ref 132–146)
WBC OTHER # BLD: 6.2 K/UL (ref 4.5–11.5)

## 2023-12-21 PROCEDURE — 2060000000 HC ICU INTERMEDIATE R&B

## 2023-12-21 PROCEDURE — 74018 RADEX ABDOMEN 1 VIEW: CPT

## 2023-12-21 PROCEDURE — 94640 AIRWAY INHALATION TREATMENT: CPT

## 2023-12-21 PROCEDURE — 6370000000 HC RX 637 (ALT 250 FOR IP): Performed by: INTERNAL MEDICINE

## 2023-12-21 PROCEDURE — 85025 COMPLETE CBC W/AUTO DIFF WBC: CPT

## 2023-12-21 PROCEDURE — 6360000004 HC RX CONTRAST MEDICATION: Performed by: INTERNAL MEDICINE

## 2023-12-21 PROCEDURE — 6360000002 HC RX W HCPCS: Performed by: FAMILY MEDICINE

## 2023-12-21 PROCEDURE — 6370000000 HC RX 637 (ALT 250 FOR IP): Performed by: FAMILY MEDICINE

## 2023-12-21 PROCEDURE — 2580000003 HC RX 258: Performed by: FAMILY MEDICINE

## 2023-12-21 PROCEDURE — 80053 COMPREHEN METABOLIC PANEL: CPT

## 2023-12-21 RX ORDER — METOPROLOL TARTRATE 50 MG/1
50 TABLET, FILM COATED ORAL 2 TIMES DAILY
Status: DISCONTINUED | OUTPATIENT
Start: 2023-12-21 | End: 2023-12-23 | Stop reason: HOSPADM

## 2023-12-21 RX ORDER — CLOTRIMAZOLE 10 MG/1
10 LOZENGE ORAL; TOPICAL
Status: DISCONTINUED | OUTPATIENT
Start: 2023-12-21 | End: 2023-12-23 | Stop reason: HOSPADM

## 2023-12-21 RX ADMIN — DIATRIZOATE MEGLUMINE AND DIATRIZOATE SODIUM 30 ML: 600; 100 SOLUTION ORAL; RECTAL at 19:34

## 2023-12-21 RX ADMIN — ATORVASTATIN CALCIUM 10 MG: 10 TABLET, FILM COATED ORAL at 21:38

## 2023-12-21 RX ADMIN — MEMANTINE 10 MG: 10 TABLET ORAL at 21:38

## 2023-12-21 RX ADMIN — SODIUM CHLORIDE, PRESERVATIVE FREE 10 ML: 5 INJECTION INTRAVENOUS at 01:35

## 2023-12-21 RX ADMIN — ALBUTEROL SULFATE 2.5 MG: 2.5 SOLUTION RESPIRATORY (INHALATION) at 20:13

## 2023-12-21 RX ADMIN — MIRTAZAPINE 30 MG: 15 TABLET, FILM COATED ORAL at 21:38

## 2023-12-21 RX ADMIN — METOPROLOL TARTRATE 50 MG: 50 TABLET ORAL at 21:38

## 2023-12-21 RX ADMIN — VALPROIC ACID 250 MG: 250 SOLUTION ORAL at 01:34

## 2023-12-21 RX ADMIN — MIRTAZAPINE 30 MG: 15 TABLET, FILM COATED ORAL at 01:33

## 2023-12-21 RX ADMIN — ALBUTEROL SULFATE 2.5 MG: 2.5 SOLUTION RESPIRATORY (INHALATION) at 12:04

## 2023-12-21 RX ADMIN — FOLIC ACID 1 MG: 1 TABLET ORAL at 01:33

## 2023-12-21 RX ADMIN — MEMANTINE 10 MG: 10 TABLET ORAL at 01:33

## 2023-12-21 RX ADMIN — VALPROIC ACID 250 MG: 250 SOLUTION ORAL at 21:38

## 2023-12-21 RX ADMIN — ATORVASTATIN CALCIUM 10 MG: 10 TABLET, FILM COATED ORAL at 01:34

## 2023-12-21 NOTE — CARE COORDINATION
Social Work /Transition of Care:    Pt presented to the ED secondary to a wound check from [facility]. Pt has hx of dementia and has a PEG. Pt is admitted inpatient with left leg wound. Per liaison from [facility], pt is a bed hold and able to return upon discharge, no precert needed.

## 2023-12-21 NOTE — PROGRESS NOTES
4 Eyes Skin Assessment     NAME:  Yamila Sesay  YOB: 1940  MEDICAL RECORD NUMBER:  [id number]    The patient is being assessed for  Admission    I agree that at least one RN has performed a thorough Head to Toe Skin Assessment on the patient. ALL assessment sites listed below have been assessed. Areas assessed by both nurses:    Head, Face, Ears, Shoulders, Back, Chest, Arms, Elbows, Hands, Sacrum. Buttock, Coccyx, Ischium, Legs. Feet and Heels, and Under Medical Devices         Does the Patient have a Wound? Yes wound(s) were present on assessment.  LDA wound assessment was Initiated and completed by RN       Boy Prevention initiated by RN: Yes  Wound Care Orders initiated by RN: Yes    Pressure Injury (Stage 3,4, Unstageable, DTI, NWPT, and Complex wounds) if present, place Wound referral order by RN under : Yes    New Ostomies, if present place, Ostomy referral order under : No     Nurse 1 eSignature: Electronically signed by Adrian Valdez RN on 12/21/23 at 4:24 PM EST    **SHARE this note so that the co-signing nurse can place an eSignature**    Nurse 2 eSignature: Electronically signed by Suzanne Motley RN on 12/21/23 at 5:03 PM EST

## 2023-12-21 NOTE — ED NOTES
Received bedside shift report from YUSUF Calderon; assumed care of pt at this time; resting in bed; needs met; call bell in reach; will monitor

## 2023-12-21 NOTE — H&P
Hospital Medicine History & Physical      PCP: Sadiq Peace DO    Date of Admission: 12/20/2023    Date of Service: . DEC 21, 2023    Chief Complaint:   LLE WOUND      History Of Present Illness:     79 y.o. female presented with LLE WOUND THAT HAS BECOME WORSE, IT APPARENTLY MSSA POSITIVE   SHE IS A POOR HISTORIAN     Past Medical History:      No past medical history on file. Past Surgical History:          Procedure Laterality Date    IR PERC CATH PLEURAL DRAIN W/IMAG  9/20/2023    IR PERC CATH PLEURAL DRAIN W/IMAG 9/20/2023 Roslyn Brasher MD [facility] SPECIAL PROCEDURES    UPPER GASTROINTESTINAL ENDOSCOPY N/A 10/4/2023    EGD ESOPHAGOGASTRODUODENOSCOPY PEG TUBE INSERTION performed by Madan Lange MD at [address] [place] [address]       Medications Prior to Admission:      Prior to Admission medications    Medication Sig Start Date End Date Taking? Authorizing Provider   doxycycline Monohydrate (VIBRAMYCIN) 25 MG/5ML SUSR suspension Take 20 mLs by mouth 2 times daily 12/7/23 12/22/23 Yes Elin Paz MD   metoprolol tartrate (LOPRESSOR) 50 MG tablet Take 1 tablet by mouth 2 times daily 10/3/23   Milton Krueger MD   valproic acid (DEPAKENE) 250 MG/5ML SOLN oral solution Take 5 mLs by mouth in the morning, at noon, and at bedtime 10/3/23   Milton Krueger MD   albuterol (PROVENTIL) (2.5 MG/3ML) 0.083% nebulizer solution Take 3 mLs by nebulization in the morning and 3 mLs in the evening.  10/3/23   Milton Krueger MD   acetaminophen (TYLENOL) 325 MG tablet Take 2 tablets by mouth every 6 hours as needed for Pain or Fever    Elin Paz MD   ferrous sulfate (IRON 325) 325 (65 Fe) MG tablet Take 1 tablet by mouth daily (with breakfast)    Elin Paz MD   folic acid (FOLVITE) 1 MG tablet Take 1 tablet by mouth daily    Elin Paz MD   memantine (NAMENDA) 10 MG tablet effort. Clear to auscultation,   Cardiovascular:  RRR  Abdomen: Soft, non-tender, non-distended with normal bowel sounds. Musculoskeletal:  No clubbing, cyanosis or edema bilaterally. Skin: DSD INTACT LLE   Neurologic:   Cranial nerves: II-XII intact,   Psychiatric:  Alert and oriented x 1        Labs:     Recent Labs     12/20/23  1052 12/21/23  0553   WBC 7.6 6.2   HGB 10.6* 10.4*   HCT 34.2 34.3    311     Recent Labs     12/20/23  1052 12/20/23  1249 12/21/23  0553     --  144   K 5.5* 4.6 4.0     --  109*   CO2 23  --  22   BUN 29*  --  24*   CREATININE 0.6  --  0.6   CALCIUM 9.2  --  9.2     Recent Labs     12/20/23  1052 12/21/23  0553   AST 43* 19   ALT 10 8   BILITOT 0.2 0.2   ALKPHOS 127* 101     No results for input(s): \"INR\" in the last 72 hours. No results for input(s): \"CKTOTAL\", \"TROPONINI\" in the last 72 hours. Urinalysis:      Lab Results   Component Value Date/Time    NITRU NEGATIVE 12/20/2023 10:52 AM    WBCUA 5-10 07/01/2023 11:48 PM    BACTERIA MODERATE 07/01/2023 11:48 PM    RBCUA 0-1 07/01/2023 11:48 PM    BLOODU LARGE 07/01/2023 11:48 PM    SPECGRAV 1.010 12/20/2023 10:52 AM    GLUCOSEU NEGATIVE 12/20/2023 10:52 AM       Radiology:           XR FEMUR LEFT (MIN 2 VIEWS)   Final Result   1. No definite evidence of subcutaneous air in the soft tissues about the   left femur. No evidence of osseous destruction or erosion. 2.  No acute osseous findings about the left femur. Slightly decreased   osseous mineralization. 3.  Mild left hip osteoarthritis. Previous left knee arthroplasty. RECOMMENDATION:   In the setting of recent trauma, if there is persistent symptoms and physical   exam warrants a repeat radiograph in 10-14 days could be considered as occult   fractures may not be evident on initial imaging evaluation. XR CHEST PORTABLE   Final Result   No acute process.              ASSESSMENT:    Active Hospital Problems    Diagnosis Date Noted

## 2023-12-22 PROBLEM — E43 SEVERE PROTEIN-CALORIE MALNUTRITION (GOMEZ: LESS THAN 60% OF STANDARD WEIGHT) (HCC): Chronic | Status: ACTIVE | Noted: 2023-12-22

## 2023-12-22 PROBLEM — E43 SEVERE PROTEIN-CALORIE MALNUTRITION (HCC): Status: ACTIVE | Noted: 2023-12-22

## 2023-12-22 LAB
ANION GAP SERPL CALCULATED.3IONS-SCNC: 14 MMOL/L (ref 7–16)
BASOPHILS # BLD: 0.07 K/UL (ref 0–0.2)
BASOPHILS NFR BLD: 1 % (ref 0–2)
BUN SERPL-MCNC: 27 MG/DL (ref 6–23)
CALCIUM SERPL-MCNC: 9.4 MG/DL (ref 8.6–10.2)
CHLORIDE SERPL-SCNC: 109 MMOL/L (ref 98–107)
CO2 SERPL-SCNC: 23 MMOL/L (ref 22–29)
CREAT SERPL-MCNC: 0.6 MG/DL (ref 0.5–1)
EOSINOPHIL # BLD: 0.28 K/UL (ref 0.05–0.5)
EOSINOPHILS RELATIVE PERCENT: 5 % (ref 0–6)
ERYTHROCYTE [DISTWIDTH] IN BLOOD BY AUTOMATED COUNT: 17.1 % (ref 11.5–15)
GFR SERPL CREATININE-BSD FRML MDRD: >60 ML/MIN/1.73M2
GLUCOSE SERPL-MCNC: 63 MG/DL (ref 74–99)
HCT VFR BLD AUTO: 36 % (ref 34–48)
HGB BLD-MCNC: 11.1 G/DL (ref 11.5–15.5)
IMM GRANULOCYTES # BLD AUTO: <0.03 K/UL (ref 0–0.58)
IMM GRANULOCYTES NFR BLD: 0 % (ref 0–5)
LYMPHOCYTES NFR BLD: 1.86 K/UL (ref 1.5–4)
LYMPHOCYTES RELATIVE PERCENT: 33 % (ref 20–42)
MCH RBC QN AUTO: 29.9 PG (ref 26–35)
MCHC RBC AUTO-ENTMCNC: 30.8 G/DL (ref 32–34.5)
MCV RBC AUTO: 97 FL (ref 80–99.9)
MONOCYTES NFR BLD: 0.58 K/UL (ref 0.1–0.95)
MONOCYTES NFR BLD: 10 % (ref 2–12)
NEUTROPHILS NFR BLD: 51 % (ref 43–80)
NEUTS SEG NFR BLD: 2.89 K/UL (ref 1.8–7.3)
PLATELET # BLD AUTO: 391 K/UL (ref 130–450)
PMV BLD AUTO: 10 FL (ref 7–12)
POTASSIUM SERPL-SCNC: 4.4 MMOL/L (ref 3.5–5)
RBC # BLD AUTO: 3.71 M/UL (ref 3.5–5.5)
SODIUM SERPL-SCNC: 146 MMOL/L (ref 132–146)
WBC OTHER # BLD: 5.7 K/UL (ref 4.5–11.5)

## 2023-12-22 PROCEDURE — 97530 THERAPEUTIC ACTIVITIES: CPT

## 2023-12-22 PROCEDURE — 36415 COLL VENOUS BLD VENIPUNCTURE: CPT

## 2023-12-22 PROCEDURE — 85025 COMPLETE CBC W/AUTO DIFF WBC: CPT

## 2023-12-22 PROCEDURE — 97535 SELF CARE MNGMENT TRAINING: CPT

## 2023-12-22 PROCEDURE — 6370000000 HC RX 637 (ALT 250 FOR IP): Performed by: FAMILY MEDICINE

## 2023-12-22 PROCEDURE — 92610 EVALUATE SWALLOWING FUNCTION: CPT

## 2023-12-22 PROCEDURE — 6370000000 HC RX 637 (ALT 250 FOR IP): Performed by: INTERNAL MEDICINE

## 2023-12-22 PROCEDURE — 97165 OT EVAL LOW COMPLEX 30 MIN: CPT

## 2023-12-22 PROCEDURE — 97161 PT EVAL LOW COMPLEX 20 MIN: CPT

## 2023-12-22 PROCEDURE — 2060000000 HC ICU INTERMEDIATE R&B

## 2023-12-22 PROCEDURE — 2580000003 HC RX 258: Performed by: FAMILY MEDICINE

## 2023-12-22 PROCEDURE — 94640 AIRWAY INHALATION TREATMENT: CPT

## 2023-12-22 PROCEDURE — 80048 BASIC METABOLIC PNL TOTAL CA: CPT

## 2023-12-22 PROCEDURE — 6360000002 HC RX W HCPCS: Performed by: FAMILY MEDICINE

## 2023-12-22 RX ORDER — CLOTRIMAZOLE 10 MG/1
10 LOZENGE ORAL; TOPICAL
Qty: 50 TABLET | Refills: 0 | DISCHARGE
Start: 2023-12-22 | End: 2024-01-01

## 2023-12-22 RX ADMIN — ALBUTEROL SULFATE 2.5 MG: 2.5 SOLUTION RESPIRATORY (INHALATION) at 19:56

## 2023-12-22 RX ADMIN — METOPROLOL TARTRATE 50 MG: 50 TABLET ORAL at 11:19

## 2023-12-22 RX ADMIN — FERROUS SULFATE TAB 325 MG (65 MG ELEMENTAL FE) 325 MG: 325 (65 FE) TAB at 11:17

## 2023-12-22 RX ADMIN — MIRTAZAPINE 30 MG: 15 TABLET, FILM COATED ORAL at 20:36

## 2023-12-22 RX ADMIN — FOLIC ACID 1 MG: 1 TABLET ORAL at 11:18

## 2023-12-22 RX ADMIN — SODIUM CHLORIDE, PRESERVATIVE FREE 10 ML: 5 INJECTION INTRAVENOUS at 20:37

## 2023-12-22 RX ADMIN — ATORVASTATIN CALCIUM 10 MG: 10 TABLET, FILM COATED ORAL at 20:36

## 2023-12-22 RX ADMIN — VALPROIC ACID 250 MG: 250 SOLUTION ORAL at 20:36

## 2023-12-22 RX ADMIN — VALPROIC ACID 250 MG: 250 SOLUTION ORAL at 11:18

## 2023-12-22 RX ADMIN — MEMANTINE 10 MG: 10 TABLET ORAL at 11:17

## 2023-12-22 RX ADMIN — ALBUTEROL SULFATE 2.5 MG: 2.5 SOLUTION RESPIRATORY (INHALATION) at 05:46

## 2023-12-22 RX ADMIN — VALPROIC ACID 250 MG: 250 SOLUTION ORAL at 15:32

## 2023-12-22 RX ADMIN — SODIUM CHLORIDE, PRESERVATIVE FREE 10 ML: 5 INJECTION INTRAVENOUS at 11:17

## 2023-12-22 NOTE — PLAN OF CARE
Problem: Safety - Adult  Goal: Free from fall injury  Outcome: Progressing  Flowsheets (Taken 12/22/2023 1132)  Free From Fall Injury: Instruct family/caregiver on patient safety     Problem: Discharge Planning  Goal: Discharge to home or other facility with appropriate resources  Outcome: Progressing     Problem: Skin/Tissue Integrity  Goal: Absence of new skin breakdown  Description: 1. Monitor for areas of redness and/or skin breakdown  2. Assess vascular access sites hourly  3. Every 4-6 hours minimum:  Change oxygen saturation probe site  4. Every 4-6 hours:  If on nasal continuous positive airway pressure, respiratory therapy assess nares and determine need for appliance change or resting period. Outcome: Progressing     Problem: Confusion  Goal: Confusion, delirium, dementia, or psychosis is improved or at baseline  Description: INTERVENTIONS:  1. Assess for possible contributors to thought disturbance, including medications, impaired vision or hearing, underlying metabolic abnormalities, dehydration, psychiatric diagnoses, and notify attending LIP  2. [place] high risk fall precautions, as indicated  3. Provide frequent short contacts to provide reality reorientation, refocusing and direction  4. Decrease environmental stimuli, including noise as appropriate  5. Monitor and intervene to maintain adequate nutrition, hydration, elimination, sleep and activity  6. If unable to ensure safety without constant attention obtain sitter and review sitter guidelines with assigned personnel  7.  Initiate Psychosocial CNS and Spiritual Care consult, as indicated  Outcome: Progressing     Problem: Pain  Goal: Verbalizes/displays adequate comfort level or baseline comfort level  Outcome: Progressing     Problem: ABCDS Injury Assessment  Goal: Absence of physical injury  Outcome: Progressing

## 2023-12-22 NOTE — CARE COORDINATION
Care Coordination VRE isolation  The patient was admitted from [facility] and is a bed hold and can return once medically stable. The patient was admitted due to a Left lower extremity wound and is MSSA positive . Culture of the patients wound was done and  awaiting results. Id was consulted. The patient is npo with peg and tube feeding. Spoke to the patients primary contact the olu Harvey @ [phone]  and per him the plan for the patient to return to [facility] at discharge. Return envelope done. The patient was set up to be picked up at [facility] pm by [facility] to go to [facility]. However the patient has a + blood culture come back so Dr Tayla Rodrigues was called back again about this and feels is a false positive. The patient is now cleared or discharge and per [facility] they can only do 9 pm. Called [facility] and they are also book solid. Called The patients jordynderik Brockyayo Harvey @ [phone] and she is aware of the time. Jossie from [facility] notified of the time.

## 2023-12-22 NOTE — PROGRESS NOTES
[address]          ORAT:                                                   Patient Name: Radha Lea     MRN: [id number]     : 1940     Room: [address] [place] [address]       Evaluating OT: Aliyah Dinero OTD, OTR/L, [id number]      Referring Provider: DOMINIQUE Scott CNP     Specific Provider Orders/Date: OT eval and treat (23)    Diagnosis: Wound infection [T14. 8XXA, L08.9]  Leg wound, left, initial encounter [W95.900W]    Surgeries/Procedures: None this admission       Pt admitted d/t worsening LLE wound. Pertinent Medical History:       has no past medical history on file.          Precautions:  Fall Risk, Contact isolation (VRE), Hx R ashley     Assessment of current deficits    [x] Functional mobility  [x]ADLs  [x] Strength               [x]Cognition    [x] Functional transfers   [x] IADLs         [x] Safety Awareness   [x]Endurance    [x] Fine Coordination              [x] Balance      [] Vision/perception   [x]Sensation     [x]Gross Motor Coordination  [x] ROM  [] Delirium                   [x] Motor Control     OT PLAN OF CARE   OT POC based on physician orders, patient diagnosis and results of clinical assessment    Frequency/Duration= 1-3 days/wk for 2 weeks PRN   Specific OT Treatment Interventions to include:   * Instruction/training on adapted ADL techniques and AE recommendations to increase functional independence within precautions       * Training on energy conservation strategies, correct breathing pattern and techniques to improve independence/tolerance for self-care routine  * Functional transfer/mobility training/DME recommendations for increased independence, safety, and fall prevention  * Patient/Family education to increase follow through with safety techniques and functional independence  * Recommendation of environmental modifications and prior level of function, interpretation of standardized testing/informal observation of tasks, assessment of data and development of plan of care and goals.             Alver Cooler, OTD,  OTR/L; [id number]

## 2023-12-22 NOTE — PROGRESS NOTES
Physical Therapy  Physical Therapy Initial Assessment     Name: Destin Cordero  : 1940  MRN: [id number]      Date of Service: 2023    Evaluating PT:  Bhumika Yang PT, DPT [id number]    Room #:  6842/1207-Y  Diagnosis:  Wound infection [T14. 8XXA, L08.9]  Leg wound, left, initial encounter [B01.271C]  PMHx/PSHx:  No past medical history on file. Procedure/Surgery:  None  Precautions:  Falls, bed alarm, contact iso, cognition, Hx of Alzheimer's   Equipment Needs:  TBD    SUBJECTIVE:    Pt was admitted from SNF. Pt reported being bedbound and not active with PT PTA. OBJECTIVE:   Initial Evaluation  Date: 23 Treatment Short Term/ Long Term   Goals   AM-PAC 6 Clicks 014     Was pt agreeable to Eval/treatment? Yes     Does pt have pain?  No c/o pain     Bed Mobility  Rolling: NT  Supine to sit: MaxA x 2  Sit to supine: MaxA x 2  Scooting: NT  MaxA   Transfers Sit to stand: NT  Stand to sit: NT  Stand pivot: NT     Ambulation   NT     Stair negotiation: ascended and descended NT     ROM BUE:  Limited RUE   BLE:  WFL     Strength BUE:  weakness noted in BUEs (R worse than L)  BLE:  2+/5 grossly except R DF 0/5  Increase by 1/3 MMT grade   Balance Sitting EOB:  MaxA  Dynamic Standing:  NT  Sitting EOB:  Independent  Dynamic Standing:  NA     Pt is A & O x 2 self and place  Sensation:  no reported paresthesias  Edema:  none    Therapeutic Exercises:  B LAQs in available range x 5 reps, AAROM B hip flexion and ankle x 5 reps    Patient education  Pt educated on safety    Patient response to education:   Pt verbalized understanding Pt demonstrated skill Pt requires further education in this area   yes yes yes     ASSESSMENT:    Conditions Requiring Skilled Therapeutic Intervention:    [x]Decreased strength     [x]Decreased ROM  [x]Decreased functional mobility  [x]Decreased balance   [x]Decreased endurance   [x]Decreased posture  []Decreased sensation  []Decreased coordination   []Decreased vision  [x]Decreased safety awareness   []Increased pain       Comments:  Pt was in bed upon arrival, agreeable to initial evaluation. Reoriented pt to time and situation. Pt required increased assistance for all tasks due to deconditioning. Pt was asymptomatic with movement. ROM completed as noted above. Pt exhibited a posterior lean at EOB, requiring assistance to maintain balance. Pt appeared to be at baseline. Pt was left in bed with all needs met and call light in reach. Bed alarm on. Treatment:  Patient practiced and was instructed in the following treatment:    Bed mobility training - pt given verbal and tactile cues to facilitate proper sequencing and safety during  supine>sit as well as provided with physical assistance. Sitting EOB for >8 minutes for upright tolerance, postural awareness and BLE ROM  Skilled positioning - Pt placed in the chair position with pillows utilized to facilitate upright posture, joint and skin integrity, and interaction with environment. Pt's/ family goals   1. None stated    Prognosis is good for reaching above PT goals. Patient and or family understand(s) diagnosis, prognosis, and plan of care:   [] Yes [x] No due to cognition    PHYSICAL THERAPY PLAN OF CARE:    PT POC is established based on physician order and patient diagnosis     Referring provider/PT Order:  Davey Olguin Mai, APRN - CNP  /12/20/23 4505  PT eval and treat  Diagnosis:  Wound infection [T14. 8XXA, L08.9]  Leg wound, left, initial encounter [S86.142P]  Specific instructions for next treatment:  Progress activity     Current Treatment Recommendations:     [x] Strengthening to improve independence with functional mobility   [x] ROM to improve independence with functional mobility   [x] Balance Training to improve static/dynamic balance and to reduce fall risk  [x] Endurance Training to improve activity tolerance during functional mobility   [x] Transfer Training to improve safety and

## 2023-12-22 NOTE — CONSULTS
Department of Internal Medicine  Infectious Diseases   Consult Note      Reason for Consult:  left leg wound     Requesting Physician : Dr Klein Pop:      Pt is known to me . This is an 79 yrs old female with hx of dementia, a fib,  PE, HTN , recently treated for Empyema ( in 9/2023 ) from nursing home presented to the ER with left leg wound.  There has been no fever , no leukocytosis , WBC was 7.6 K   Wound cx - [facility] and [facility]       Past Medical History:    ]  A fib, dementia, seizure disorder , A fib , PE       Past Surgical History:    Past Surgical History:   Procedure Laterality Date    IR PERC CATH PLEURAL DRAIN W/IMAG  9/20/2023    IR PERC CATH PLEURAL DRAIN W/IMAG 9/20/2023 Lui Harper MD [facility]    UPPER GASTROINTESTINAL ENDOSCOPY N/A 10/4/2023    EGD ESOPHAGOGASTRODUODENOSCOPY PEG TUBE INSERTION performed by Barbara Umana MD at [facility]         Current Medications:      Current Facility-Administered Medications   Medication Dose Route Frequency Provider Last Rate Last Admin    metoprolol tartrate (LOPRESSOR) tablet 50 mg  50 mg Oral BID Ana Unicoi, DO   50 mg at 12/21/23 2138    clotrimazole (MYCELEX) kris 10 mg  10 mg Oral 5x Daily Ana Unicoi, DO        diatrizoate meglumine-sodium (GASTROGRAFIN) 66-10 % solution 30 mL  30 mL PEG Tube ONCE PRN Ana Unicoi, DO   30 mL at 12/21/23 1934    albuterol (PROVENTIL) (2.5 MG/3ML) 0.083% nebulizer solution 2.5 mg  2.5 mg Nebulization Q12H Jani Olguin APRN - CNP   2.5 mg at 12/22/23 0546    [Held by provider] apixaban (ELIQUIS) tablet 5 mg  5 mg Oral BID Jani Olguin, APRN - CNP        atorvastatin (LIPITOR) tablet 10 mg  10 mg Oral QHS Jani Olguin, APRN - CNP   10 mg at 12/21/23 2138    docusate sodium (COLACE) capsule 100 mg  100 mg Oral BID Jani Olguin APRN - CNP        ferrous sulfate (IRON 325) tablet 325 mg  325 mg Oral Daily with breakfast Learn, MCH 29.9 12/22/2023 05:44 AM    MCHC 30.8 12/22/2023 05:44 AM    RDW 17.1 12/22/2023 05:44 AM    NRBC 1 10/03/2023 06:35 AM    SEGSPCT 54 03/14/2011 11:00 AM    METASPCT 1 09/29/2023 06:30 AM    METASPCT 1.7 07/07/2023 07:35 AM    LYMPHOPCT 33 12/22/2023 05:44 AM    MONOPCT 10 12/22/2023 05:44 AM    MYELOPCT 1 10/02/2023 05:21 AM    MYELOPCT 1.7 07/07/2023 07:35 AM    BASOPCT 1 12/22/2023 05:44 AM    MONOSABS 0.58 12/22/2023 05:44 AM    LYMPHSABS 1.86 12/22/2023 05:44 AM    EOSABS 0.28 12/22/2023 05:44 AM    BASOSABS 0.07 12/22/2023 05:44 AM       CMP     Lab Results   Component Value Date/Time     12/22/2023 05:44 AM    K 4.4 12/22/2023 05:44 AM    K 3.9 10/05/2021 03:36 PM     12/22/2023 05:44 AM    CO2 23 12/22/2023 05:44 AM    BUN 27 12/22/2023 05:44 AM    CREATININE 0.6 12/22/2023 05:44 AM    GFRAA >60 10/05/2021 03:36 PM    LABGLOM >60 12/22/2023 05:44 AM    GLUCOSE 63 12/22/2023 05:44 AM    PROT 7.1 12/21/2023 05:53 AM    LABALBU 3.3 12/21/2023 05:53 AM    CALCIUM 9.4 12/22/2023 05:44 AM    BILITOT 0.2 12/21/2023 05:53 AM    ALKPHOS 101 12/21/2023 05:53 AM    AST 19 12/21/2023 05:53 AM    ALT 8 12/21/2023 05:53 AM         Hepatic Function Panel:    Lab Results   Component Value Date/Time    ALKPHOS 101 12/21/2023 05:53 AM    ALT 8 12/21/2023 05:53 AM    AST 19 12/21/2023 05:53 AM    PROT 7.1 12/21/2023 05:53 AM    BILITOT 0.2 12/21/2023 05:53 AM    BILIDIR <0.2 09/24/2023 04:13 AM    IBILI Can not be calculated 09/24/2023 04:13 AM    LABALBU 3.3 12/21/2023 05:53 AM       PT/INR:    Lab Results   Component Value Date/Time    PROTIME 15.3 09/21/2023 02:44 PM    INR 1.4 09/21/2023 02:44 PM       TSH:    Lab Results   Component Value Date/Time    TSH 5.07 09/19/2023 10:58 AM       U/A:    Lab Results   Component Value Date/Time    COLORU Yellow 12/20/2023 10:52 AM    PHUR 7.0 12/20/2023 10:52 AM    WBCUA 5-10 07/01/2023 11:48 PM    RBCUA 0-1 07/01/2023 11:48 PM    MUCUS Present 01/04/2023 09:05 PM

## 2023-12-22 NOTE — PLAN OF CARE
Problem: Safety - Adult  Goal: Free from fall injury  Outcome: Progressing     Problem: Skin/Tissue Integrity  Goal: Absence of new skin breakdown  Description: 1. Monitor for areas of redness and/or skin breakdown  2. Assess vascular access sites hourly  3. Every 4-6 hours minimum:  Change oxygen saturation probe site  4. Every 4-6 hours:  If on nasal continuous positive airway pressure, respiratory therapy assess nares and determine need for appliance change or resting period. Outcome: Progressing     Problem: Confusion  Goal: Confusion, delirium, dementia, or psychosis is improved or at baseline  Description: INTERVENTIONS:  1. Assess for possible contributors to thought disturbance, including medications, impaired vision or hearing, underlying metabolic abnormalities, dehydration, psychiatric diagnoses, and notify attending LIP  2. Temple high risk fall precautions, as indicated  3. Provide frequent short contacts to provide reality reorientation, refocusing and direction  4. Decrease environmental stimuli, including noise as appropriate  5. Monitor and intervene to maintain adequate nutrition, hydration, elimination, sleep and activity  6. If unable to ensure safety without constant attention obtain sitter and review sitter guidelines with assigned personnel  7.  Initiate Psychosocial CNS and Spiritual Care consult, as indicated  Outcome: Progressing     Problem: Pain  Goal: Verbalizes/displays adequate comfort level or baseline comfort level  Outcome: Progressing

## 2023-12-22 NOTE — CONSULTS
Comprehensive Nutrition Assessment    Type and Reason for Visit:  Initial, Consult    Nutrition Recommendations/Plan:   Continue NPO, Start Tube feeding  TF rec:  Standard w/ Fiber (Jevity 1.5) @ 35 ml/hr + 1 protein modular daily  Flush 120 ml water q 4 hrs   Will provide 840 ml TV, 1260 kcals, 54 gm pro (1364 kcals, 80 gm pro w/ mod), 638 ml free water, 1358 ml total fluids     Malnutrition Assessment:  Malnutrition Status:  Severe malnutrition (12/22/23 1234)    Context:  Chronic Illness     Findings of the 6 clinical characteristics of malnutrition:  Energy Intake:  75% or less estimated energy requirements for 1 month or longer  Weight Loss:  No significant weight loss     Body Fat Loss:  Severe body fat loss Triceps   Muscle Mass Loss:  Severe muscle mass loss Temples (temporalis), Clavicles (pectoralis & deltoids)  Fluid Accumulation:  No significant fluid accumulation     Strength:  Not Performed    Nutrition Assessment:    Pt admit from ECF 2/2 infected leg wound (MSSA positive). PMHx dementia, dysphagia s/p PEG placement 10/4/23, colitis, HTN, HLD. Pt meets criteria for severe malnutrition. Pt pending SLP eval. Will provide updated TF recs and continue to monitor. Nutrition Related Findings:    AMS, active BS, oral thrush, PEG, no edema, fluids WDL, boggy heels     Wound Type: Open Wounds       Current Nutrition Intake & Therapies:    Average Meal Intake: NPO     Diet NPO    Anthropometric Measures:  Height: 160 cm (5' 3\")  Ideal Body Weight (IBW): 115 lbs (52 kg)    Admission Body Weight: 64.9 kg (143 lb) (12/20 actual)  Current Body Weight: 64.9 kg (143 lb) (12/20 actual), 124.3 % IBW. Current BMI (kg/m2): 25.3  Usual Body Weight: 64.9 kg (143 lb) (9/25/23 actual per EMR)  % Weight Change (Calculated): 0  Weight Adjustment For: No Adjustment                 BMI Categories: Overweight (BMI 25.0-29. 9)    Estimated Daily Nutrient Needs:  Energy Requirements Based On: Formula  Weight Used for

## 2023-12-22 NOTE — PROGRESS NOTES
SPEECH/LANGUAGE PATHOLOGY  CLINICAL ASSESSMENT OF SWALLOWING FUNCTION   and PLAN OF CARE    PATIENT NAME:  Raymundo Mclaughlin  (female)     MRN:  [id number]    :  1940  (79 y.o.)  STATUS:  Inpatient: Room 7401/7401-A    TODAY'S DATE:  2023  REFERRING PROVIDER:     SPECIFIC PROVIDER ORDER: SLP swallowing-dysphagia evaluation and treatment Date of order:  23  REASON FOR REFERRAL:  dysphagia   EVALUATING THERAPIST: MIGUEL Lawrence                 RESULTS:    DYSPHAGIA DIAGNOSIS:   Clinical indicators of moderate-severe oropharyngeal phase dysphagia       DIET RECOMMENDATIONS:  NPO -- including medications. Nutrition, fluids and medication to be administered through current NG/PEG tube. FEEDING RECOMMENDATIONS:     Assistance level:  Not applicable      Compensatory strategies recommended: Not applicable      Discussed recommendations with nursing and/or faxed report to referring provider: No secondary to no diet/liquid change recommended     SPEECH THERAPY  PLAN OF CARE   The dysphagia POC is established based on physician order, dysphagia diagnosis and results of clinical assessment     Dysphagia therapy is not recommended     Conditions Requiring Skilled Therapeutic Intervention for dysphagia:    Not applicable    Specific dysphagia interventions to include:     not applicable    Specific instructions for next treatment:  not applicable   Patient Treatment Goals:    Short Term Goals:  Not applicable no therapy warranted     Long Term Goals:   Not applicable no therapy warranted      Patient/family Goal:    not applicable                    ADMITTING DIAGNOSIS: Wound infection [T14. 8XXA, L08.9]  Leg wound, left, initial encounter [S81.802A]    VISIT DIAGNOSIS:   Visit Diagnoses         Codes    Wound infection    -  Primary T14. 8XXA, L08.9             PATIENT REPORT/COMPLAINT: Pt NPO with oral feedings at facility    PRIOR LEVEL OF SWALLOW FUNCTION:    PAST HISTORY OF DYSPHAGIA?:

## 2023-12-23 VITALS
RESPIRATION RATE: 18 BRPM | DIASTOLIC BLOOD PRESSURE: 65 MMHG | HEIGHT: 63 IN | WEIGHT: 143 LBS | HEART RATE: 60 BPM | BODY MASS INDEX: 25.34 KG/M2 | TEMPERATURE: 98.2 F | SYSTOLIC BLOOD PRESSURE: 118 MMHG | OXYGEN SATURATION: 98 %

## 2023-12-23 LAB
ACB COMPLEX DNA BLD POS QL NAA+NON-PROBE: NOT DETECTED
B FRAGILIS DNA BLD POS QL NAA+NON-PROBE: NOT DETECTED
BIOFIRE TEST COMMENT: ABNORMAL
C ALBICANS DNA BLD POS QL NAA+NON-PROBE: NOT DETECTED
C AURIS DNA BLD POS QL NAA+NON-PROBE: NOT DETECTED
C GATTII+NEOFOR DNA BLD POS QL NAA+N-PRB: NOT DETECTED
C GLABRATA DNA BLD POS QL NAA+NON-PROBE: NOT DETECTED
C KRUSEI DNA BLD POS QL NAA+NON-PROBE: NOT DETECTED
C PARAP DNA BLD POS QL NAA+NON-PROBE: NOT DETECTED
C TROPICLS DNA BLD POS QL NAA+NON-PROBE: NOT DETECTED
E CLOAC COMP DNA BLD POS NAA+NON-PROBE: NOT DETECTED
E COLI DNA BLD POS QL NAA+NON-PROBE: NOT DETECTED
E FAECALIS DNA BLD POS QL NAA+NON-PROBE: NOT DETECTED
E FAECIUM DNA BLD POS QL NAA+NON-PROBE: NOT DETECTED
ENTEROBACTERALES DNA BLD POS NAA+N-PRB: NOT DETECTED
GP B STREP DNA BLD POS QL NAA+NON-PROBE: NOT DETECTED
HAEM INFLU DNA BLD POS QL NAA+NON-PROBE: NOT DETECTED
K OXYTOCA DNA BLD POS QL NAA+NON-PROBE: NOT DETECTED
KLEBSIELLA SP DNA BLD POS QL NAA+NON-PRB: NOT DETECTED
KLEBSIELLA SP DNA BLD POS QL NAA+NON-PRB: NOT DETECTED
L MONOCYTOG DNA BLD POS QL NAA+NON-PROBE: NOT DETECTED
MICROORGANISM SPEC CULT: ABNORMAL
MICROORGANISM/AGENT SPEC: ABNORMAL
N MEN DNA BLD POS QL NAA+NON-PROBE: NOT DETECTED
P AERUGINOSA DNA BLD POS NAA+NON-PROBE: NOT DETECTED
PROTEUS SP DNA BLD POS QL NAA+NON-PROBE: NOT DETECTED
S AUREUS DNA BLD POS QL NAA+NON-PROBE: NOT DETECTED
S AUREUS+CONS DNA BLD POS NAA+NON-PROBE: DETECTED
S EPIDERMIDIS DNA BLD POS QL NAA+NON-PRB: NOT DETECTED
S LUGDUNENSIS DNA BLD POS QL NAA+NON-PRB: NOT DETECTED
S MALTOPHILIA DNA BLD POS QL NAA+NON-PRB: NOT DETECTED
S MARCESCENS DNA BLD POS NAA+NON-PROBE: NOT DETECTED
S PNEUM DNA BLD POS QL NAA+NON-PROBE: NOT DETECTED
S PYO DNA BLD POS QL NAA+NON-PROBE: NOT DETECTED
SALMONELLA DNA BLD POS QL NAA+NON-PROBE: NOT DETECTED
SERVICE CMNT-IMP: ABNORMAL
SPECIMEN DESCRIPTION: ABNORMAL
STREPTOCOCCUS DNA BLD POS NAA+NON-PROBE: NOT DETECTED

## 2023-12-23 NOTE — PROGRESS NOTES
[facility] called to give an updated ETA. They stated that they there will be an delay 2-3 hrs, they tried to out source but nothing was available.     Mercedes Kraus RN  9:11 PM

## 2023-12-24 LAB
MICROORGANISM SPEC CULT: ABNORMAL
MICROORGANISM/AGENT SPEC: ABNORMAL
SPECIMEN DESCRIPTION: ABNORMAL

## 2023-12-25 LAB
MICROORGANISM SPEC CULT: NORMAL
SERVICE CMNT-IMP: NORMAL
SPECIMEN DESCRIPTION: NORMAL

## 2023-12-26 NOTE — DISCHARGE SUMMARY
reading provider will be dictating this exam?->CRC FINDINGS: Pubic symphysis appears congruent. Normal appearance of the superior and inferior pubic rami. No obvious disruption of the pelvic rim. Hip joint space on the left is preserved. Mild acetabular spurring and sclerosis. Normal contour to the left femoral head. Left femoral head/neck trabeculations appear maintained. No cortical irregularities along the course of the left femur. No abnormal periosteal elevation. Patient has undergone previous left knee arthroplasty. Hardware appears intact. Vascular calcifications in the soft tissues. No definite evidence of subcutaneous air in the soft tissues. 1.  No definite evidence of subcutaneous air in the soft tissues about the left femur. No evidence of osseous destruction or erosion. 2.  No acute osseous findings about the left femur. Slightly decreased osseous mineralization. 3.  Mild left hip osteoarthritis. Previous left knee arthroplasty. RECOMMENDATION: In the setting of recent trauma, if there is persistent symptoms and physical exam warrants a repeat radiograph in 10-14 days could be considered as occult fractures may not be evident on initial imaging evaluation. XR CHEST PORTABLE    Result Date: 12/20/2023  EXAMINATION: ONE XRAY VIEW OF THE CHEST 12/20/2023 12:00 pm COMPARISON: 10/03/2023 HISTORY: ORDERING SYSTEM PROVIDED HISTORY: left leg wound, TECHNOLOGIST PROVIDED HISTORY: Reason for exam:->left leg wound, What reading provider will be dictating this exam?->CRC FINDINGS: The lungs are without acute focal process. There is no effusion or pneumothorax. The cardiomediastinal silhouette is without acute process. The osseous structures are without acute process. No acute process.        Discharge Medications:      Medication List        START taking these medications      clotrimazole 10 MG kris  Commonly known as: MYCELEX  Take 1 tablet by mouth 5 times daily for 10 days

## 2025-02-24 NOTE — TELEPHONE ENCOUNTER
Yuri Mckenzie at [facility] of Dr. Durán Sales recommendation. F/U scheduled for 10/4/23 at 9:40 a.m.
Guarded

## (undated) DEVICE — BINDER ABD UNISX 4 PNL PREM 6INX6INX12IN L XL 4

## (undated) DEVICE — DEFENDO AIR WATER SUCTION AND BIOPSY VALVE KIT FOR  OLYMPUS: Brand: DEFENDO AIR/WATER/SUCTION AND BIOPSY VALVE

## (undated) DEVICE — BITEBLOCK 54FR W/ DENT RIM BLOX

## (undated) DEVICE — GAUZE,SPONGE,4"X4",8PLY,STRL,LF,10/TRAY: Brand: MEDLINE

## (undated) DEVICE — Device